# Patient Record
Sex: FEMALE | Race: WHITE | NOT HISPANIC OR LATINO | Employment: OTHER | URBAN - METROPOLITAN AREA
[De-identification: names, ages, dates, MRNs, and addresses within clinical notes are randomized per-mention and may not be internally consistent; named-entity substitution may affect disease eponyms.]

---

## 2024-03-06 ENCOUNTER — APPOINTMENT (OUTPATIENT)
Dept: RADIOLOGY | Facility: HOSPITAL | Age: 74
DRG: 560 | End: 2024-03-06
Payer: MEDICARE

## 2024-03-06 ENCOUNTER — ANESTHESIA EVENT (OUTPATIENT)
Dept: OPERATING ROOM | Facility: HOSPITAL | Age: 74
DRG: 560 | End: 2024-03-06
Payer: MEDICARE

## 2024-03-06 ENCOUNTER — HOSPITAL ENCOUNTER (INPATIENT)
Facility: HOSPITAL | Age: 74
LOS: 1 days | Discharge: NURSING, REHAB OR LONG-TERM CARE | DRG: 560 | End: 2024-03-07
Attending: STUDENT IN AN ORGANIZED HEALTH CARE EDUCATION/TRAINING PROGRAM | Admitting: INTERNAL MEDICINE
Payer: MEDICARE

## 2024-03-06 ENCOUNTER — APPOINTMENT (OUTPATIENT)
Dept: CARDIOLOGY | Facility: HOSPITAL | Age: 74
DRG: 560 | End: 2024-03-06
Payer: MEDICARE

## 2024-03-06 ENCOUNTER — ANESTHESIA (OUTPATIENT)
Dept: OPERATING ROOM | Facility: HOSPITAL | Age: 74
DRG: 560 | End: 2024-03-06
Payer: MEDICARE

## 2024-03-06 DIAGNOSIS — S73.005D DISLOCATION OF LEFT HIP, SUBSEQUENT ENCOUNTER: ICD-10-CM

## 2024-03-06 DIAGNOSIS — S73.005A DISLOCATION OF LEFT HIP, INITIAL ENCOUNTER (MULTI): Primary | ICD-10-CM

## 2024-03-06 PROBLEM — J44.9 CHRONIC OBSTRUCTIVE PULMONARY DISEASE (MULTI): Status: ACTIVE | Noted: 2024-03-06

## 2024-03-06 PROBLEM — E03.9 HYPOTHYROIDISM: Status: ACTIVE | Noted: 2024-03-06

## 2024-03-06 PROBLEM — E11.9 DIABETES MELLITUS, TYPE 2 (MULTI): Status: ACTIVE | Noted: 2024-03-06

## 2024-03-06 PROBLEM — J45.909 ASTHMA (HHS-HCC): Status: ACTIVE | Noted: 2024-03-06

## 2024-03-06 PROBLEM — E78.5 HYPERLIPIDEMIA: Status: ACTIVE | Noted: 2024-03-06

## 2024-03-06 PROBLEM — I48.91 ATRIAL FIBRILLATION (MULTI): Status: ACTIVE | Noted: 2024-03-06

## 2024-03-06 PROBLEM — I10 HTN (HYPERTENSION): Status: ACTIVE | Noted: 2024-03-06

## 2024-03-06 LAB
ABO GROUP (TYPE) IN BLOOD: NORMAL
ALBUMIN SERPL BCP-MCNC: 3.5 G/DL (ref 3.4–5)
ALP SERPL-CCNC: 129 U/L (ref 33–136)
ALT SERPL W P-5'-P-CCNC: 10 U/L (ref 7–45)
ANION GAP SERPL CALC-SCNC: 9 MMOL/L (ref 10–20)
ANTIBODY SCREEN: NORMAL
APTT PPP: 38 SECONDS (ref 27–38)
AST SERPL W P-5'-P-CCNC: 8 U/L (ref 9–39)
BILIRUB SERPL-MCNC: 0.9 MG/DL (ref 0–1.2)
BUN SERPL-MCNC: 22 MG/DL (ref 6–23)
CALCIUM SERPL-MCNC: 8.8 MG/DL (ref 8.6–10.3)
CHLORIDE SERPL-SCNC: 106 MMOL/L (ref 98–107)
CO2 SERPL-SCNC: 27 MMOL/L (ref 21–32)
CREAT SERPL-MCNC: 0.88 MG/DL (ref 0.5–1.05)
EGFRCR SERPLBLD CKD-EPI 2021: 69 ML/MIN/1.73M*2
ERYTHROCYTE [DISTWIDTH] IN BLOOD BY AUTOMATED COUNT: 14.8 % (ref 11.5–14.5)
GLUCOSE BLD MANUAL STRIP-MCNC: 116 MG/DL (ref 74–99)
GLUCOSE BLD MANUAL STRIP-MCNC: 159 MG/DL (ref 74–99)
GLUCOSE SERPL-MCNC: 126 MG/DL (ref 74–99)
HCT VFR BLD AUTO: 42.3 % (ref 36–46)
HGB BLD-MCNC: 13 G/DL (ref 12–16)
INR PPP: 1.3 (ref 0.9–1.1)
MCH RBC QN AUTO: 27.1 PG (ref 26–34)
MCHC RBC AUTO-ENTMCNC: 30.7 G/DL (ref 32–36)
MCV RBC AUTO: 88 FL (ref 80–100)
NRBC BLD-RTO: 0 /100 WBCS (ref 0–0)
PLATELET # BLD AUTO: 289 X10*3/UL (ref 150–450)
POTASSIUM SERPL-SCNC: 4.3 MMOL/L (ref 3.5–5.3)
PROT SERPL-MCNC: 5.8 G/DL (ref 6.4–8.2)
PROTHROMBIN TIME: 14.9 SECONDS (ref 9.8–12.8)
RBC # BLD AUTO: 4.8 X10*6/UL (ref 4–5.2)
RH FACTOR (ANTIGEN D): NORMAL
SODIUM SERPL-SCNC: 138 MMOL/L (ref 136–145)
WBC # BLD AUTO: 16.3 X10*3/UL (ref 4.4–11.3)

## 2024-03-06 PROCEDURE — 82947 ASSAY GLUCOSE BLOOD QUANT: CPT

## 2024-03-06 PROCEDURE — 80053 COMPREHEN METABOLIC PANEL: CPT | Performed by: NURSE PRACTITIONER

## 2024-03-06 PROCEDURE — 2500000004 HC RX 250 GENERAL PHARMACY W/ HCPCS (ALT 636 FOR OP/ED)

## 2024-03-06 PROCEDURE — 99285 EMERGENCY DEPT VISIT HI MDM: CPT | Mod: 25

## 2024-03-06 PROCEDURE — 73502 X-RAY EXAM HIP UNI 2-3 VIEWS: CPT | Mod: LT

## 2024-03-06 PROCEDURE — 3700000012 HC SEDATION LEVEL 5+ TIME - INITIAL 15 MINUTES 5/> YEARS

## 2024-03-06 PROCEDURE — A27252 PR CLOSED RX TRAUMA HIP DISLOC,ANESTH: Performed by: ANESTHESIOLOGY

## 2024-03-06 PROCEDURE — 96374 THER/PROPH/DIAG INJ IV PUSH: CPT

## 2024-03-06 PROCEDURE — 85610 PROTHROMBIN TIME: CPT | Performed by: NURSE PRACTITIONER

## 2024-03-06 PROCEDURE — 96376 TX/PRO/DX INJ SAME DRUG ADON: CPT

## 2024-03-06 PROCEDURE — 2500000004 HC RX 250 GENERAL PHARMACY W/ HCPCS (ALT 636 FOR OP/ED): Performed by: ANESTHESIOLOGY

## 2024-03-06 PROCEDURE — 2500000002 HC RX 250 W HCPCS SELF ADMINISTERED DRUGS (ALT 637 FOR MEDICARE OP, ALT 636 FOR OP/ED)

## 2024-03-06 PROCEDURE — A27266 PR CLOSED RX POST HIP FIX DISLOC,ANESTH: Performed by: ANESTHESIOLOGY

## 2024-03-06 PROCEDURE — A27252 PR CLOSED RX TRAUMA HIP DISLOC,ANESTH: Performed by: ANESTHESIOLOGIST ASSISTANT

## 2024-03-06 PROCEDURE — 0QS7XZZ REPOSITION LEFT UPPER FEMUR, EXTERNAL APPROACH: ICD-10-PCS | Performed by: ORTHOPAEDIC SURGERY

## 2024-03-06 PROCEDURE — 96361 HYDRATE IV INFUSION ADD-ON: CPT

## 2024-03-06 PROCEDURE — 99222 1ST HOSP IP/OBS MODERATE 55: CPT | Performed by: NURSE PRACTITIONER

## 2024-03-06 PROCEDURE — 93005 ELECTROCARDIOGRAM TRACING: CPT

## 2024-03-06 PROCEDURE — 86901 BLOOD TYPING SEROLOGIC RH(D): CPT | Performed by: NURSE PRACTITIONER

## 2024-03-06 PROCEDURE — 96375 TX/PRO/DX INJ NEW DRUG ADDON: CPT

## 2024-03-06 PROCEDURE — 99222 1ST HOSP IP/OBS MODERATE 55: CPT | Performed by: INTERNAL MEDICINE

## 2024-03-06 PROCEDURE — 99100 ANES PT EXTEME AGE<1 YR&>70: CPT | Performed by: ANESTHESIOLOGY

## 2024-03-06 PROCEDURE — 7100000001 HC RECOVERY ROOM TIME - INITIAL BASE CHARGE: Performed by: ORTHOPAEDIC SURGERY

## 2024-03-06 PROCEDURE — 27252 TREAT HIP DISLOCATION: CPT | Performed by: ORTHOPAEDIC SURGERY

## 2024-03-06 PROCEDURE — 2500000004 HC RX 250 GENERAL PHARMACY W/ HCPCS (ALT 636 FOR OP/ED): Mod: MUE | Performed by: ANESTHESIOLOGIST ASSISTANT

## 2024-03-06 PROCEDURE — 3700000002 HC GENERAL ANESTHESIA TIME - EACH INCREMENTAL 1 MINUTE: Performed by: ORTHOPAEDIC SURGERY

## 2024-03-06 PROCEDURE — 2500000004 HC RX 250 GENERAL PHARMACY W/ HCPCS (ALT 636 FOR OP/ED): Performed by: NURSE PRACTITIONER

## 2024-03-06 PROCEDURE — 2500000005 HC RX 250 GENERAL PHARMACY W/O HCPCS: Performed by: STUDENT IN AN ORGANIZED HEALTH CARE EDUCATION/TRAINING PROGRAM

## 2024-03-06 PROCEDURE — 2500000004 HC RX 250 GENERAL PHARMACY W/ HCPCS (ALT 636 FOR OP/ED): Performed by: STUDENT IN AN ORGANIZED HEALTH CARE EDUCATION/TRAINING PROGRAM

## 2024-03-06 PROCEDURE — A27266 PR CLOSED RX POST HIP FIX DISLOC,ANESTH: Performed by: ANESTHESIOLOGIST ASSISTANT

## 2024-03-06 PROCEDURE — 3700000001 HC GENERAL ANESTHESIA TIME - INITIAL BASE CHARGE: Performed by: ORTHOPAEDIC SURGERY

## 2024-03-06 PROCEDURE — 2720000007 HC OR 272 NO HCPCS: Performed by: ORTHOPAEDIC SURGERY

## 2024-03-06 PROCEDURE — 3600000007 HC OR TIME - EACH INCREMENTAL 1 MINUTE - PROCEDURE LEVEL TWO: Performed by: ORTHOPAEDIC SURGERY

## 2024-03-06 PROCEDURE — 2500000005 HC RX 250 GENERAL PHARMACY W/O HCPCS

## 2024-03-06 PROCEDURE — 36415 COLL VENOUS BLD VENIPUNCTURE: CPT | Performed by: NURSE PRACTITIONER

## 2024-03-06 PROCEDURE — 85730 THROMBOPLASTIN TIME PARTIAL: CPT | Performed by: NURSE PRACTITIONER

## 2024-03-06 PROCEDURE — 2500000005 HC RX 250 GENERAL PHARMACY W/O HCPCS: Performed by: ANESTHESIOLOGIST ASSISTANT

## 2024-03-06 PROCEDURE — 7100000002 HC RECOVERY ROOM TIME - EACH INCREMENTAL 1 MINUTE: Performed by: ORTHOPAEDIC SURGERY

## 2024-03-06 PROCEDURE — 1200000002 HC GENERAL ROOM WITH TELEMETRY DAILY

## 2024-03-06 PROCEDURE — 99222 1ST HOSP IP/OBS MODERATE 55: CPT

## 2024-03-06 PROCEDURE — 3600000002 HC OR TIME - INITIAL BASE CHARGE - PROCEDURE LEVEL TWO: Performed by: ORTHOPAEDIC SURGERY

## 2024-03-06 PROCEDURE — 27265 TREAT HIP DISLOCATION: CPT

## 2024-03-06 PROCEDURE — 76000 FLUOROSCOPY <1 HR PHYS/QHP: CPT

## 2024-03-06 PROCEDURE — 85027 COMPLETE CBC AUTOMATED: CPT | Performed by: NURSE PRACTITIONER

## 2024-03-06 PROCEDURE — 2500000001 HC RX 250 WO HCPCS SELF ADMINISTERED DRUGS (ALT 637 FOR MEDICARE OP)

## 2024-03-06 PROCEDURE — 2500000004 HC RX 250 GENERAL PHARMACY W/ HCPCS (ALT 636 FOR OP/ED): Performed by: ANESTHESIOLOGIST ASSISTANT

## 2024-03-06 PROCEDURE — 73502 X-RAY EXAM HIP UNI 2-3 VIEWS: CPT | Mod: LEFT SIDE | Performed by: RADIOLOGY

## 2024-03-06 RX ORDER — ONDANSETRON HYDROCHLORIDE 2 MG/ML
4 INJECTION, SOLUTION INTRAVENOUS EVERY 4 HOURS PRN
Status: DISCONTINUED | OUTPATIENT
Start: 2024-03-06 | End: 2024-03-07 | Stop reason: HOSPADM

## 2024-03-06 RX ORDER — DEXTROSE 50 % IN WATER (D50W) INTRAVENOUS SYRINGE
25
Status: DISCONTINUED | OUTPATIENT
Start: 2024-03-06 | End: 2024-03-07 | Stop reason: HOSPADM

## 2024-03-06 RX ORDER — ONDANSETRON HYDROCHLORIDE 2 MG/ML
4 INJECTION, SOLUTION INTRAVENOUS ONCE AS NEEDED
Status: DISCONTINUED | OUTPATIENT
Start: 2024-03-06 | End: 2024-03-06

## 2024-03-06 RX ORDER — PROPOFOL 10 MG/ML
0.5 INJECTION, EMULSION INTRAVENOUS AS NEEDED
Status: DISCONTINUED | OUTPATIENT
Start: 2024-03-06 | End: 2024-03-06

## 2024-03-06 RX ORDER — SODIUM CHLORIDE, SODIUM LACTATE, POTASSIUM CHLORIDE, CALCIUM CHLORIDE 600; 310; 30; 20 MG/100ML; MG/100ML; MG/100ML; MG/100ML
100 INJECTION, SOLUTION INTRAVENOUS CONTINUOUS
Status: DISCONTINUED | OUTPATIENT
Start: 2024-03-06 | End: 2024-03-06 | Stop reason: HOSPADM

## 2024-03-06 RX ORDER — CARVEDILOL 12.5 MG/1
12.5 TABLET ORAL
Status: DISCONTINUED | OUTPATIENT
Start: 2024-03-06 | End: 2024-03-07 | Stop reason: HOSPADM

## 2024-03-06 RX ORDER — DIPHENHYDRAMINE HYDROCHLORIDE 50 MG/ML
25 INJECTION INTRAMUSCULAR; INTRAVENOUS ONCE AS NEEDED
Status: DISCONTINUED | OUTPATIENT
Start: 2024-03-06 | End: 2024-03-06

## 2024-03-06 RX ORDER — HYDROMORPHONE HYDROCHLORIDE 1 MG/ML
1 INJECTION, SOLUTION INTRAMUSCULAR; INTRAVENOUS; SUBCUTANEOUS EVERY 5 MIN PRN
Status: DISCONTINUED | OUTPATIENT
Start: 2024-03-06 | End: 2024-03-06

## 2024-03-06 RX ORDER — ACETAMINOPHEN 325 MG/1
975 TABLET ORAL ONCE
Status: DISCONTINUED | OUTPATIENT
Start: 2024-03-06 | End: 2024-03-06

## 2024-03-06 RX ORDER — OXYCODONE HYDROCHLORIDE 5 MG/1
5 TABLET ORAL EVERY 6 HOURS PRN
Status: DISCONTINUED | OUTPATIENT
Start: 2024-03-06 | End: 2024-03-07 | Stop reason: HOSPADM

## 2024-03-06 RX ORDER — BUPROPION HYDROCHLORIDE 150 MG/1
300 TABLET ORAL DAILY
Status: DISCONTINUED | OUTPATIENT
Start: 2024-03-06 | End: 2024-03-07 | Stop reason: HOSPADM

## 2024-03-06 RX ORDER — FENTANYL CITRATE 50 UG/ML
50 INJECTION, SOLUTION INTRAMUSCULAR; INTRAVENOUS ONCE
Status: COMPLETED | OUTPATIENT
Start: 2024-03-06 | End: 2024-03-06

## 2024-03-06 RX ORDER — HYDRALAZINE HYDROCHLORIDE 20 MG/ML
5 INJECTION INTRAMUSCULAR; INTRAVENOUS EVERY 30 MIN PRN
Status: DISCONTINUED | OUTPATIENT
Start: 2024-03-06 | End: 2024-03-06

## 2024-03-06 RX ORDER — ALBUTEROL SULFATE 0.83 MG/ML
2.5 SOLUTION RESPIRATORY (INHALATION) ONCE AS NEEDED
Status: DISCONTINUED | OUTPATIENT
Start: 2024-03-06 | End: 2024-03-06

## 2024-03-06 RX ORDER — SODIUM CHLORIDE, SODIUM LACTATE, POTASSIUM CHLORIDE, CALCIUM CHLORIDE 600; 310; 30; 20 MG/100ML; MG/100ML; MG/100ML; MG/100ML
100 INJECTION, SOLUTION INTRAVENOUS CONTINUOUS
Status: DISCONTINUED | OUTPATIENT
Start: 2024-03-06 | End: 2024-03-07 | Stop reason: HOSPADM

## 2024-03-06 RX ORDER — GABAPENTIN 300 MG/1
900 CAPSULE ORAL 2 TIMES DAILY
COMMUNITY

## 2024-03-06 RX ORDER — METOPROLOL TARTRATE 1 MG/ML
5 INJECTION, SOLUTION INTRAVENOUS ONCE
Status: DISCONTINUED | OUTPATIENT
Start: 2024-03-06 | End: 2024-03-06

## 2024-03-06 RX ORDER — LIDOCAINE HYDROCHLORIDE 10 MG/ML
0.1 INJECTION INFILTRATION; PERINEURAL ONCE
Status: DISCONTINUED | OUTPATIENT
Start: 2024-03-06 | End: 2024-03-06 | Stop reason: HOSPADM

## 2024-03-06 RX ORDER — LOSARTAN POTASSIUM 50 MG/1
50 TABLET ORAL DAILY
Status: DISCONTINUED | OUTPATIENT
Start: 2024-03-06 | End: 2024-03-07 | Stop reason: HOSPADM

## 2024-03-06 RX ORDER — DEXTROSE MONOHYDRATE 100 MG/ML
0.3 INJECTION, SOLUTION INTRAVENOUS ONCE AS NEEDED
Status: DISCONTINUED | OUTPATIENT
Start: 2024-03-06 | End: 2024-03-07 | Stop reason: HOSPADM

## 2024-03-06 RX ORDER — IPRATROPIUM BROMIDE 17 UG/1
2 AEROSOL, METERED RESPIRATORY (INHALATION)
COMMUNITY

## 2024-03-06 RX ORDER — ACETAMINOPHEN 500 MG
1000 TABLET ORAL EVERY 6 HOURS PRN
COMMUNITY

## 2024-03-06 RX ORDER — INSULIN LISPRO 100 [IU]/ML
0-10 INJECTION, SOLUTION INTRAVENOUS; SUBCUTANEOUS EVERY 4 HOURS
Status: DISCONTINUED | OUTPATIENT
Start: 2024-03-06 | End: 2024-03-07 | Stop reason: HOSPADM

## 2024-03-06 RX ORDER — PROPOFOL 10 MG/ML
INJECTION, EMULSION INTRAVENOUS CODE/TRAUMA/SEDATION MEDICATION
Status: COMPLETED | OUTPATIENT
Start: 2024-03-06 | End: 2024-03-06

## 2024-03-06 RX ORDER — LEVOTHYROXINE SODIUM 125 UG/1
125 TABLET ORAL
Status: DISCONTINUED | OUTPATIENT
Start: 2024-03-07 | End: 2024-03-07 | Stop reason: HOSPADM

## 2024-03-06 RX ORDER — POLYETHYLENE GLYCOL 3350 17 G/17G
17 POWDER, FOR SOLUTION ORAL DAILY
Status: DISCONTINUED | OUTPATIENT
Start: 2024-03-06 | End: 2024-03-07 | Stop reason: HOSPADM

## 2024-03-06 RX ORDER — LIDOCAINE HCL/PF 100 MG/5ML
SYRINGE (ML) INTRAVENOUS AS NEEDED
Status: DISCONTINUED | OUTPATIENT
Start: 2024-03-06 | End: 2024-03-06

## 2024-03-06 RX ORDER — ZOLPIDEM TARTRATE 5 MG/1
5 TABLET ORAL NIGHTLY
Status: DISCONTINUED | OUTPATIENT
Start: 2024-03-06 | End: 2024-03-07 | Stop reason: HOSPADM

## 2024-03-06 RX ORDER — LABETALOL HYDROCHLORIDE 5 MG/ML
5 INJECTION, SOLUTION INTRAVENOUS ONCE AS NEEDED
Status: DISCONTINUED | OUTPATIENT
Start: 2024-03-06 | End: 2024-03-06

## 2024-03-06 RX ORDER — SODIUM CHLORIDE 9 MG/ML
50 INJECTION, SOLUTION INTRAVENOUS CONTINUOUS
Status: ACTIVE | OUTPATIENT
Start: 2024-03-06 | End: 2024-03-07

## 2024-03-06 RX ORDER — ONDANSETRON 4 MG/1
4 TABLET, FILM COATED ORAL EVERY 8 HOURS PRN
Status: DISCONTINUED | OUTPATIENT
Start: 2024-03-06 | End: 2024-03-07 | Stop reason: HOSPADM

## 2024-03-06 RX ORDER — MIDAZOLAM HYDROCHLORIDE 1 MG/ML
1 INJECTION, SOLUTION INTRAMUSCULAR; INTRAVENOUS ONCE AS NEEDED
Status: DISCONTINUED | OUTPATIENT
Start: 2024-03-06 | End: 2024-03-06

## 2024-03-06 RX ORDER — ATORVASTATIN CALCIUM 40 MG/1
40 TABLET, FILM COATED ORAL DAILY
Status: DISCONTINUED | OUTPATIENT
Start: 2024-03-06 | End: 2024-03-07 | Stop reason: HOSPADM

## 2024-03-06 RX ORDER — ACETYLCYSTEINE 200 MG/ML
4 SOLUTION ORAL; RESPIRATORY (INHALATION) 4 TIMES DAILY PRN
Status: DISCONTINUED | OUTPATIENT
Start: 2024-03-06 | End: 2024-03-07 | Stop reason: HOSPADM

## 2024-03-06 RX ORDER — LEVOTHYROXINE SODIUM 125 UG/1
125 TABLET ORAL
COMMUNITY

## 2024-03-06 RX ORDER — HYDROMORPHONE HYDROCHLORIDE 1 MG/ML
1 INJECTION, SOLUTION INTRAMUSCULAR; INTRAVENOUS; SUBCUTANEOUS EVERY 5 MIN PRN
Status: DISCONTINUED | OUTPATIENT
Start: 2024-03-06 | End: 2024-03-06 | Stop reason: HOSPADM

## 2024-03-06 RX ORDER — SODIUM CHLORIDE, SODIUM LACTATE, POTASSIUM CHLORIDE, CALCIUM CHLORIDE 600; 310; 30; 20 MG/100ML; MG/100ML; MG/100ML; MG/100ML
50 INJECTION, SOLUTION INTRAVENOUS CONTINUOUS
Status: DISCONTINUED | OUTPATIENT
Start: 2024-03-06 | End: 2024-03-07

## 2024-03-06 RX ORDER — BUPROPION HYDROCHLORIDE 300 MG/1
300 TABLET ORAL DAILY
COMMUNITY

## 2024-03-06 RX ORDER — MEPERIDINE HYDROCHLORIDE 25 MG/ML
12.5 INJECTION INTRAMUSCULAR; INTRAVENOUS; SUBCUTANEOUS EVERY 10 MIN PRN
Status: DISCONTINUED | OUTPATIENT
Start: 2024-03-06 | End: 2024-03-06 | Stop reason: HOSPADM

## 2024-03-06 RX ORDER — PROPOFOL 10 MG/ML
INJECTION, EMULSION INTRAVENOUS AS NEEDED
Status: DISCONTINUED | OUTPATIENT
Start: 2024-03-06 | End: 2024-03-06

## 2024-03-06 RX ORDER — ONDANSETRON HYDROCHLORIDE 2 MG/ML
4 INJECTION, SOLUTION INTRAVENOUS ONCE
Status: COMPLETED | OUTPATIENT
Start: 2024-03-06 | End: 2024-03-06

## 2024-03-06 RX ORDER — SCOLOPAMINE TRANSDERMAL SYSTEM 1 MG/1
1 PATCH, EXTENDED RELEASE TRANSDERMAL ONCE
Status: DISCONTINUED | OUTPATIENT
Start: 2024-03-06 | End: 2024-03-06

## 2024-03-06 RX ORDER — ATORVASTATIN CALCIUM 40 MG/1
40 TABLET, FILM COATED ORAL DAILY
COMMUNITY

## 2024-03-06 RX ORDER — ACETYLCYSTEINE 200 MG/ML
4 SOLUTION ORAL; RESPIRATORY (INHALATION)
Status: DISCONTINUED | OUTPATIENT
Start: 2024-03-06 | End: 2024-03-06

## 2024-03-06 RX ORDER — LOSARTAN POTASSIUM 50 MG/1
50 TABLET ORAL DAILY
COMMUNITY

## 2024-03-06 RX ORDER — ACETYLCYSTEINE 200 MG/ML
4 SOLUTION ORAL; RESPIRATORY (INHALATION)
COMMUNITY

## 2024-03-06 RX ORDER — ACETAMINOPHEN 650 MG/1
650 SUPPOSITORY RECTAL EVERY 4 HOURS PRN
Status: DISCONTINUED | OUTPATIENT
Start: 2024-03-06 | End: 2024-03-07 | Stop reason: HOSPADM

## 2024-03-06 RX ORDER — MEPERIDINE HYDROCHLORIDE 25 MG/ML
12.5 INJECTION INTRAMUSCULAR; INTRAVENOUS; SUBCUTANEOUS EVERY 10 MIN PRN
Status: DISCONTINUED | OUTPATIENT
Start: 2024-03-06 | End: 2024-03-06

## 2024-03-06 RX ORDER — ONDANSETRON HYDROCHLORIDE 2 MG/ML
4 INJECTION, SOLUTION INTRAVENOUS EVERY 8 HOURS PRN
Status: DISCONTINUED | OUTPATIENT
Start: 2024-03-06 | End: 2024-03-07 | Stop reason: HOSPADM

## 2024-03-06 RX ORDER — ZOLPIDEM TARTRATE 12.5 MG/1
12.5 TABLET, FILM COATED, EXTENDED RELEASE ORAL NIGHTLY PRN
COMMUNITY

## 2024-03-06 RX ORDER — GABAPENTIN 300 MG/1
900 CAPSULE ORAL 2 TIMES DAILY
Status: DISCONTINUED | OUTPATIENT
Start: 2024-03-06 | End: 2024-03-07 | Stop reason: HOSPADM

## 2024-03-06 RX ORDER — TRAMADOL HYDROCHLORIDE 50 MG/1
50 TABLET ORAL
Status: DISCONTINUED | OUTPATIENT
Start: 2024-03-06 | End: 2024-03-07 | Stop reason: HOSPADM

## 2024-03-06 RX ORDER — CARVEDILOL 12.5 MG/1
TABLET ORAL
COMMUNITY

## 2024-03-06 RX ORDER — FAMOTIDINE 10 MG/ML
20 INJECTION INTRAVENOUS ONCE
Status: DISCONTINUED | OUTPATIENT
Start: 2024-03-06 | End: 2024-03-06

## 2024-03-06 RX ORDER — PROPOFOL 10 MG/ML
100 INJECTION, EMULSION INTRAVENOUS ONCE
Status: DISCONTINUED | OUTPATIENT
Start: 2024-03-06 | End: 2024-03-06

## 2024-03-06 RX ORDER — IPRATROPIUM BROMIDE AND ALBUTEROL SULFATE 2.5; .5 MG/3ML; MG/3ML
SOLUTION RESPIRATORY (INHALATION)
Status: COMPLETED
Start: 2024-03-06 | End: 2024-03-06

## 2024-03-06 RX ORDER — ROCURONIUM BROMIDE 10 MG/ML
INJECTION, SOLUTION INTRAVENOUS AS NEEDED
Status: DISCONTINUED | OUTPATIENT
Start: 2024-03-06 | End: 2024-03-06

## 2024-03-06 RX ORDER — IPRATROPIUM BROMIDE AND ALBUTEROL SULFATE 2.5; .5 MG/3ML; MG/3ML
3 SOLUTION RESPIRATORY (INHALATION)
Status: DISCONTINUED | OUTPATIENT
Start: 2024-03-06 | End: 2024-03-06 | Stop reason: HOSPADM

## 2024-03-06 RX ORDER — SUCCINYLCHOLINE CHLORIDE 20 MG/ML INJECTION SOLUTION
SOLUTION AS NEEDED
Status: DISCONTINUED | OUTPATIENT
Start: 2024-03-06 | End: 2024-03-06

## 2024-03-06 RX ORDER — CEFAZOLIN SODIUM 2 G/100ML
2 INJECTION, SOLUTION INTRAVENOUS EVERY 8 HOURS
Status: DISCONTINUED | OUTPATIENT
Start: 2024-03-06 | End: 2024-03-06 | Stop reason: HOSPADM

## 2024-03-06 RX ORDER — SODIUM CHLORIDE, SODIUM LACTATE, POTASSIUM CHLORIDE, CALCIUM CHLORIDE 600; 310; 30; 20 MG/100ML; MG/100ML; MG/100ML; MG/100ML
100 INJECTION, SOLUTION INTRAVENOUS CONTINUOUS
Status: DISCONTINUED | OUTPATIENT
Start: 2024-03-06 | End: 2024-03-06

## 2024-03-06 RX ORDER — MORPHINE SULFATE 2 MG/ML
2 INJECTION, SOLUTION INTRAMUSCULAR; INTRAVENOUS EVERY 5 MIN PRN
Status: DISCONTINUED | OUTPATIENT
Start: 2024-03-06 | End: 2024-03-06

## 2024-03-06 RX ADMIN — OXYCODONE HYDROCHLORIDE 5 MG: 5 TABLET ORAL at 18:06

## 2024-03-06 RX ADMIN — SODIUM CHLORIDE 50 ML/HR: 9 INJECTION, SOLUTION INTRAVENOUS at 04:52

## 2024-03-06 RX ADMIN — HYDROMORPHONE HYDROCHLORIDE 1 MG: 2 INJECTION, SOLUTION INTRAMUSCULAR; INTRAVENOUS; SUBCUTANEOUS at 04:20

## 2024-03-06 RX ADMIN — PROPOFOL 60 MG: 10 INJECTION, EMULSION INTRAVENOUS at 01:15

## 2024-03-06 RX ADMIN — HYDROMORPHONE HYDROCHLORIDE 0.5 MG: 1 INJECTION, SOLUTION INTRAMUSCULAR; INTRAVENOUS; SUBCUTANEOUS at 15:03

## 2024-03-06 RX ADMIN — PROPOFOL 20 MG: 10 INJECTION, EMULSION INTRAVENOUS at 01:21

## 2024-03-06 RX ADMIN — HYDROMORPHONE HYDROCHLORIDE 1 MG: 1 INJECTION, SOLUTION INTRAMUSCULAR; INTRAVENOUS; SUBCUTANEOUS at 03:32

## 2024-03-06 RX ADMIN — ONDANSETRON 4 MG: 2 INJECTION INTRAMUSCULAR; INTRAVENOUS at 01:06

## 2024-03-06 RX ADMIN — HYDROMORPHONE HYDROCHLORIDE 1 MG: 2 INJECTION, SOLUTION INTRAMUSCULAR; INTRAVENOUS; SUBCUTANEOUS at 08:50

## 2024-03-06 RX ADMIN — TRAMADOL HYDROCHLORIDE 50 MG: 50 TABLET, COATED ORAL at 20:30

## 2024-03-06 RX ADMIN — HYDROMORPHONE HYDROCHLORIDE 0.5 MG: 2 INJECTION, SOLUTION INTRAMUSCULAR; INTRAVENOUS; SUBCUTANEOUS at 13:20

## 2024-03-06 RX ADMIN — FENTANYL CITRATE 50 MCG: 50 INJECTION INTRAMUSCULAR; INTRAVENOUS at 01:06

## 2024-03-06 RX ADMIN — Medication 20 MG: at 02:55

## 2024-03-06 RX ADMIN — ZOLPIDEM TARTRATE 5 MG: 5 TABLET, COATED ORAL at 20:31

## 2024-03-06 RX ADMIN — Medication: at 00:55

## 2024-03-06 RX ADMIN — FENTANYL CITRATE 50 MCG: 50 INJECTION INTRAMUSCULAR; INTRAVENOUS at 01:57

## 2024-03-06 RX ADMIN — LIDOCAINE HYDROCHLORIDE 50 MG: 20 INJECTION INTRAVENOUS at 14:08

## 2024-03-06 RX ADMIN — SODIUM CHLORIDE 1000 ML: 9 INJECTION, SOLUTION INTRAVENOUS at 01:10

## 2024-03-06 RX ADMIN — BUPROPION HYDROCHLORIDE 300 MG: 150 TABLET, FILM COATED, EXTENDED RELEASE ORAL at 18:54

## 2024-03-06 RX ADMIN — POLYETHYLENE GLYCOL 3350 17 G: 17 POWDER, FOR SOLUTION ORAL at 18:54

## 2024-03-06 RX ADMIN — IPRATROPIUM BROMIDE AND ALBUTEROL SULFATE 3 ML: .5; 3 SOLUTION RESPIRATORY (INHALATION) at 13:25

## 2024-03-06 RX ADMIN — PROPOFOL 50 MG: 10 INJECTION, EMULSION INTRAVENOUS at 02:55

## 2024-03-06 RX ADMIN — PROPOFOL 150 MG: 10 INJECTION, EMULSION INTRAVENOUS at 02:52

## 2024-03-06 RX ADMIN — SODIUM CHLORIDE, POTASSIUM CHLORIDE, SODIUM LACTATE AND CALCIUM CHLORIDE 100 ML/HR: 600; 310; 30; 20 INJECTION, SOLUTION INTRAVENOUS at 11:38

## 2024-03-06 RX ADMIN — Medication 2 L/MIN: at 16:30

## 2024-03-06 RX ADMIN — CARVEDILOL 12.5 MG: 12.5 TABLET, FILM COATED ORAL at 18:54

## 2024-03-06 RX ADMIN — Medication 30 MG: at 02:52

## 2024-03-06 RX ADMIN — ATORVASTATIN CALCIUM 40 MG: 40 TABLET, FILM COATED ORAL at 20:30

## 2024-03-06 RX ADMIN — IPRATROPIUM BROMIDE AND ALBUTEROL SULFATE 3 ML: 2.5; .5 SOLUTION RESPIRATORY (INHALATION) at 13:25

## 2024-03-06 RX ADMIN — GABAPENTIN 900 MG: 300 CAPSULE ORAL at 20:30

## 2024-03-06 RX ADMIN — LOSARTAN POTASSIUM 50 MG: 50 TABLET, FILM COATED ORAL at 18:54

## 2024-03-06 RX ADMIN — PROPOFOL 100 MG: 10 INJECTION, EMULSION INTRAVENOUS at 14:08

## 2024-03-06 RX ADMIN — ROCURONIUM BROMIDE 5 MG: 10 INJECTION, SOLUTION INTRAVENOUS at 14:08

## 2024-03-06 RX ADMIN — Medication 100 MG: at 14:09

## 2024-03-06 SDOH — SOCIAL STABILITY: SOCIAL INSECURITY: ARE YOU OR HAVE YOU BEEN THREATENED OR ABUSED PHYSICALLY, EMOTIONALLY, OR SEXUALLY BY ANYONE?: NO

## 2024-03-06 SDOH — SOCIAL STABILITY: SOCIAL INSECURITY: ARE THERE ANY APPARENT SIGNS OF INJURIES/BEHAVIORS THAT COULD BE RELATED TO ABUSE/NEGLECT?: NO

## 2024-03-06 SDOH — SOCIAL STABILITY: SOCIAL INSECURITY: ABUSE: ADULT

## 2024-03-06 SDOH — SOCIAL STABILITY: SOCIAL INSECURITY: HAVE YOU HAD THOUGHTS OF HARMING ANYONE ELSE?: YES

## 2024-03-06 SDOH — SOCIAL STABILITY: SOCIAL INSECURITY: DO YOU FEEL UNSAFE GOING BACK TO THE PLACE WHERE YOU ARE LIVING?: NO

## 2024-03-06 SDOH — HEALTH STABILITY: MENTAL HEALTH: CURRENT SMOKER: 0

## 2024-03-06 SDOH — SOCIAL STABILITY: SOCIAL INSECURITY: DO YOU FEEL ANYONE HAS EXPLOITED OR TAKEN ADVANTAGE OF YOU FINANCIALLY OR OF YOUR PERSONAL PROPERTY?: NO

## 2024-03-06 SDOH — SOCIAL STABILITY: SOCIAL INSECURITY: DOES ANYONE TRY TO KEEP YOU FROM HAVING/CONTACTING OTHER FRIENDS OR DOING THINGS OUTSIDE YOUR HOME?: NO

## 2024-03-06 SDOH — SOCIAL STABILITY: SOCIAL INSECURITY: HAS ANYONE EVER THREATENED TO HURT YOUR FAMILY OR YOUR PETS?: NO

## 2024-03-06 ASSESSMENT — PAIN SCALES - GENERAL
PAIN_LEVEL: 1
PAINLEVEL_OUTOF10: 4
PAINLEVEL_OUTOF10: 3
PAINLEVEL_OUTOF10: 10 - WORST POSSIBLE PAIN
PAINLEVEL_OUTOF10: 7
PAINLEVEL_OUTOF10: 3
PAINLEVEL_OUTOF10: 7
PAINLEVEL_OUTOF10: 7
PAINLEVEL_OUTOF10: 10 - WORST POSSIBLE PAIN
PAINLEVEL_OUTOF10: 3
PAINLEVEL_OUTOF10: 0 - NO PAIN
PAINLEVEL_OUTOF10: 10 - WORST POSSIBLE PAIN
PAINLEVEL_OUTOF10: 0 - NO PAIN
PAINLEVEL_OUTOF10: 8

## 2024-03-06 ASSESSMENT — COGNITIVE AND FUNCTIONAL STATUS - GENERAL
MOBILITY SCORE: 13
DRESSING REGULAR UPPER BODY CLOTHING: A LITTLE
MOVING FROM LYING ON BACK TO SITTING ON SIDE OF FLAT BED WITH BEDRAILS: A LITTLE
MOVING TO AND FROM BED TO CHAIR: A LOT
PATIENT BASELINE BEDBOUND: NO
DRESSING REGULAR LOWER BODY CLOTHING: A LITTLE
MOVING FROM LYING ON BACK TO SITTING ON SIDE OF FLAT BED WITH BEDRAILS: A LITTLE
TURNING FROM BACK TO SIDE WHILE IN FLAT BAD: A LITTLE
MOBILITY SCORE: 16
STANDING UP FROM CHAIR USING ARMS: A LITTLE
DAILY ACTIVITIY SCORE: 20
HELP NEEDED FOR BATHING: A LITTLE
WALKING IN HOSPITAL ROOM: A LOT
WALKING IN HOSPITAL ROOM: A LITTLE
CLIMB 3 TO 5 STEPS WITH RAILING: TOTAL
MOBILITY SCORE: 16
DRESSING REGULAR LOWER BODY CLOTHING: A LOT
TOILETING: A LITTLE
DRESSING REGULAR LOWER BODY CLOTHING: A LITTLE
TOILETING: A LITTLE
TURNING FROM BACK TO SIDE WHILE IN FLAT BAD: A LITTLE
MOVING TO AND FROM BED TO CHAIR: A LITTLE
CLIMB 3 TO 5 STEPS WITH RAILING: TOTAL
DAILY ACTIVITIY SCORE: 20
MOVING TO AND FROM BED TO CHAIR: A LITTLE
CLIMB 3 TO 5 STEPS WITH RAILING: TOTAL
DRESSING REGULAR UPPER BODY CLOTHING: A LITTLE
TURNING FROM BACK TO SIDE WHILE IN FLAT BAD: A LITTLE
DAILY ACTIVITIY SCORE: 19
HELP NEEDED FOR BATHING: A LITTLE
HELP NEEDED FOR BATHING: A LITTLE
DRESSING REGULAR UPPER BODY CLOTHING: A LITTLE
MOVING FROM LYING ON BACK TO SITTING ON SIDE OF FLAT BED WITH BEDRAILS: A LITTLE
STANDING UP FROM CHAIR USING ARMS: A LITTLE
STANDING UP FROM CHAIR USING ARMS: A LOT
TOILETING: A LITTLE
WALKING IN HOSPITAL ROOM: A LITTLE

## 2024-03-06 ASSESSMENT — LIFESTYLE VARIABLES
SUBSTANCE_ABUSE_PAST_12_MONTHS: YES
SKIP TO QUESTIONS 9-10: 1
HOW OFTEN DO YOU HAVE A DRINK CONTAINING ALCOHOL: NEVER
HOW OFTEN DO YOU HAVE 6 OR MORE DRINKS ON ONE OCCASION: NEVER
AUDIT-C TOTAL SCORE: 0
HOW MANY STANDARD DRINKS CONTAINING ALCOHOL DO YOU HAVE ON A TYPICAL DAY: PATIENT DOES NOT DRINK
AUDIT-C TOTAL SCORE: 0

## 2024-03-06 ASSESSMENT — PATIENT HEALTH QUESTIONNAIRE - PHQ9
2. FEELING DOWN, DEPRESSED OR HOPELESS: NOT AT ALL
SUM OF ALL RESPONSES TO PHQ9 QUESTIONS 1 & 2: 0
1. LITTLE INTEREST OR PLEASURE IN DOING THINGS: NOT AT ALL

## 2024-03-06 ASSESSMENT — ACTIVITIES OF DAILY LIVING (ADL)
HEARING - RIGHT EAR: FUNCTIONAL
LACK_OF_TRANSPORTATION: NO
TOILETING: INDEPENDENT
JUDGMENT_ADEQUATE_SAFELY_COMPLETE_DAILY_ACTIVITIES: YES
BATHING: INDEPENDENT
HEARING - LEFT EAR: FUNCTIONAL
DRESSING YOURSELF: INDEPENDENT
HEARING - LEFT EAR: FUNCTIONAL
TOILETING: INDEPENDENT
GROOMING: INDEPENDENT
WALKS IN HOME: INDEPENDENT
FEEDING YOURSELF: INDEPENDENT
HEARING - RIGHT EAR: FUNCTIONAL
PATIENT'S MEMORY ADEQUATE TO SAFELY COMPLETE DAILY ACTIVITIES?: YES
WALKS IN HOME: INDEPENDENT
DRESSING YOURSELF: INDEPENDENT
ADEQUATE_TO_COMPLETE_ADL: YES
GROOMING: INDEPENDENT
FEEDING YOURSELF: INDEPENDENT
JUDGMENT_ADEQUATE_SAFELY_COMPLETE_DAILY_ACTIVITIES: YES
ADEQUATE_TO_COMPLETE_ADL: YES
BATHING: INDEPENDENT
PATIENT'S MEMORY ADEQUATE TO SAFELY COMPLETE DAILY ACTIVITIES?: YES

## 2024-03-06 ASSESSMENT — PAIN - FUNCTIONAL ASSESSMENT
PAIN_FUNCTIONAL_ASSESSMENT: 0-10

## 2024-03-06 ASSESSMENT — PAIN DESCRIPTION - LOCATION
LOCATION: HIP

## 2024-03-06 ASSESSMENT — PAIN DESCRIPTION - ORIENTATION
ORIENTATION: LEFT

## 2024-03-06 ASSESSMENT — PAIN DESCRIPTION - DESCRIPTORS
DESCRIPTORS: SPASM;STABBING
DESCRIPTORS: ACHING

## 2024-03-06 ASSESSMENT — COLUMBIA-SUICIDE SEVERITY RATING SCALE - C-SSRS
1. IN THE PAST MONTH, HAVE YOU WISHED YOU WERE DEAD OR WISHED YOU COULD GO TO SLEEP AND NOT WAKE UP?: NO
6. HAVE YOU EVER DONE ANYTHING, STARTED TO DO ANYTHING, OR PREPARED TO DO ANYTHING TO END YOUR LIFE?: NO
2. HAVE YOU ACTUALLY HAD ANY THOUGHTS OF KILLING YOURSELF?: NO

## 2024-03-06 NOTE — CARE PLAN
The patient's goals for the shift include Pain free    The clinical goals for the shift include Patient will be without pain during shift

## 2024-03-06 NOTE — ANESTHESIA POSTPROCEDURE EVALUATION
Patient: Maricel Tomlin    Procedure Summary       Date: 03/06/24 Room / Location: [facility] OR 02 / [facility] OR    Anesthesia Start: 0247 Anesthesia Stop: 0310    Procedure: Attempted Hip Dislocation Total Closed Reduction (Left: Hip) Diagnosis:       Dislocation of left hip, initial encounter (CMS/[facility])      (Dislocation of left hip, initial encounter (CMS/[facility]) [S73.005A])    Surgeons: Elia Ferraro MD Responsible Provider: Yfn Perry MD    Anesthesia Type: MAC ASA Status: 3            Anesthesia Type: MAC    Vitals Value Taken Time   /57 03/06/24 0310   Temp 36.2 03/06/24 0310   Pulse 67 03/06/24 0310   Resp 14 03/06/24 0310   SpO2 98 03/06/24 0310       Anesthesia Post Evaluation    Patient participation: complete - patient participated  Level of consciousness: awake  Pain management: adequate  Airway patency: patent  Cardiovascular status: acceptable  Respiratory status: acceptable  Hydration status: acceptable  Postoperative Nausea and Vomiting: none        No notable events documented.

## 2024-03-06 NOTE — ANESTHESIA PREPROCEDURE EVALUATION
Patient: Maricel Tomlin    Procedure Information       Date/Time: 03/06/24 1413    Procedures:       CLOSED REDUCTION OF LEFT HIP DISLOCATION OF PROSTHESIS (Left: Hip)      POSSIBLE REVISION OF LEFT HIP ARTHROPLASTY (Left: Hip)    Location: [facility] OR 07 / [facility] OR    Surgeons: Wesly Nicole MD            Relevant Problems   Anesthesia (within normal limits)      Cardiovascular   (+) Atrial fibrillation (CMS/HCC)   (+) HTN (hypertension)   (+) Hyperlipidemia      Endocrine   (+) Diabetes mellitus, type 2 (CMS/HCC)   (+) Hypothyroidism      Pulmonary   (+) Asthma   (+) Chronic obstructive pulmonary disease (CMS/HCC)       Clinical information reviewed:    Allergies  Meds               NPO Detail:  NPO/Void Status  Date of Last Liquid: 03/06/24  Time of Last Liquid: 1100  Date of Last Solid: 03/05/24  Time of Last Solid: 1400         Physical Exam    Airway  Mallampati: II  TM distance: >3 FB  Neck ROM: full     Cardiovascular - normal exam     Dental - normal exam     Pulmonary - normal exam     Abdominal   (+) obese             Anesthesia Plan    History of general anesthesia?: yes  History of complications of general anesthesia?: no    ASA 3     general     The patient is not a current smoker.  Patient was previously instructed to abstain from smoking on day of procedure.  Patient did not smoke on day of procedure.    intravenous induction   Postoperative administration of opioids is intended.  Anesthetic plan and risks discussed with patient.  Use of blood products discussed with patient who consented to blood products.    Plan discussed with CRNA.

## 2024-03-06 NOTE — CONSULTS
Reason For Consult  Left total hip arthroplasty dislocation     History Of Present Illness  Maricel Tomlin is a 73 y.o. female has a past medical history of diabetes, a-fib/a-flutter, hyperlipidemia, HTN, osteoarthritis, ELLIE, and asthma. Presented to the [place] ER yesterday after a left hip dislocation at home. Left hip was replaced per Dr. Nicole in 2018. Patient states that she was sitting on the end of her bed, and leaned over to grab something off the floor and felt a pop in her hip. She was unable to weight bear on left lower extremity. EMS was called and transferred patient to ER. X-rays were obtained in ER which confirmed left hip dislocation. Hip reduction attempted last night under anesthesia per Dr. Ferraro. States she is currently in 8/10 pain. Denies numbness and paresthesia of left lower extremity.      Past Medical History  She has a past medical history of Other conditions influencing health status, Personal history of malignant neoplasm, unspecified, Personal history of other diseases of the circulatory system, Personal history of other diseases of the musculoskeletal system and connective tissue, Personal history of other diseases of the respiratory system, Personal history of other endocrine, nutritional and metabolic disease, Personal history of other endocrine, nutritional and metabolic disease, Personal history of other malignant neoplasm of kidney, Personal history of other mental and behavioral disorders, and Personal history of other specified conditions.    Surgical History  She has a past surgical history that includes Total hip arthroplasty (05/01/2018); Other surgical history (11/30/2020); Other surgical history (11/30/2020); Other surgical history (11/30/2020); MR angio neck wo IV contrast (9/15/2020); and MR angio head wo IV contrast (9/15/2020).     Social History  She reports that she has never smoked. She does not have any smokeless tobacco history on file. She reports that she does not  "drink alcohol and does not use drugs.    Family History  No family history on file.     Allergies  Metformin and Sulfa (sulfonamide antibiotics)    Physical Exam  Pleasant. A&Ox3. Left hip dislocated. Tenderness to left lateral hip and groin. ROM deferred due to dislocation. Sensation is intact to light touch. Left lower extremity is well perfused. Patient also seen and assessed per Dr. Nicole in pre-operative area.      Last Recorded Vitals  Blood pressure 140/68, pulse 70, temperature 36.1 °C (97 °F), temperature source Temporal, resp. rate 18, height 1.702 m (5' 7\"), weight 113 kg (250 lb), SpO2 97 %.    Relevant Results  XR hip left with pelvis when performed 2 or 3 views    Result Date: 3/6/2024  Interpreted By:  Leandro Puente, STUDY: XR HIP LEFT WITH PELVIS WHEN PERFORMED 2 OR 3 VIEWS; ;  3/6/2024 12:53 am   INDICATION: Signs/Symptoms:possible dislocation.   COMPARISON: 05/14/2016   ACCESSION NUMBER(S): [id number]   ORDERING CLINICIAN: DOUGIE RUVALCABA   FINDINGS: AP radiograph of the pelvis and two views of the left hip: No acute fracture. Status post bilateral total hip arthroplasty. There is posterosuperior dislocation of the left femoral component relative to the acetabular component. There is diffuse osteopenia. Rounded metallic devices projected over the lower lumbar spine. Is mild sacroiliac arthrosis. Slight soft tissue swelling about the left hip.       1. Left hip arthroplasty dislocation.   Signed by: Leandro Puente 3/6/2024 1:12 AM Dictation workstation:   [id number]    XR foot right 3+ views    Result Date: 2/23/2024  * * *Final Report* * * DATE OF EXAM: Feb 23 2024  8:29AM   BFX   5337  -  XR FOOT 3V AP/LAT/OBL RT  / ACCESSION #  [id number] PROCEDURE REASON: Foot pain, right      * * * * Physician Interpretation * * * *  Clinical Information: Pain Three views of the right  foot were obtained. There is no acute fracture or dislocation.  Deformity of the distal aspect fifth " metatarsal likely related to remote trauma. Severe narrowing of the first metatarsophalangeal joint.  Degenerative changes second through fifth proximal and distal interphalangeal joints.   Severe degenerative changes tibiotalar joint. Prominent plantar calcaneal spur. No lytic or blastic lesions identified. No soft tissue abnormalities are seen.    IMPRESSION: No acute abnormality of the right foot. Degenerative changes. Remote posttraumatic deformity fifth metatarsal. : [facility]   Transcribe Date/Time: Feb 23 2024 10:47A Dictated by : ASIA GONZALEZ MD This examination was interpreted and the report reviewed and electronically signed by: ASIA GONZALEZ MD on Feb 23 2024 10:49AM  EST      Scheduled medications  ceFAZolin, 2 g, intravenous, q8h  [MAR Hold] insulin lispro, 0-10 Units, subcutaneous, q4h      Continuous medications  lactated Ringer's, 100 mL/hr, Last Rate: 100 mL/hr (03/06/24 1138)  oxygen,   sodium chloride 0.9%, 50 mL/hr, Last Rate: 50 mL/hr (03/06/24 7667)      PRN medications  PRN medications: [MAR Hold] acetaminophen, [MAR Hold] dextrose 10 % in water (D10W), [MAR Hold] dextrose, [MAR Hold] glucagon, [MAR Hold] HYDROmorphone, [MAR Hold] HYDROmorphone, [MAR Hold] ondansetron  Results for orders placed or performed during the hospital encounter of 03/06/24 (from the past 24 hour(s))   POCT GLUCOSE   Result Value Ref Range    POCT Glucose 159 (H) 74 - 99 mg/dL   CBC   Result Value Ref Range    WBC 16.3 (H) 4.4 - 11.3 x10*3/uL    nRBC 0.0 0.0 - 0.0 /100 WBCs    RBC 4.80 4.00 - 5.20 x10*6/uL    Hemoglobin 13.0 12.0 - 16.0 g/dL    Hematocrit 42.3 36.0 - 46.0 %    MCV 88 80 - 100 fL    MCH 27.1 26.0 - 34.0 pg    MCHC 30.7 (L) 32.0 - 36.0 g/dL    RDW 14.8 (H) 11.5 - 14.5 %    Platelets 289 150 - 450 x10*3/uL   Comprehensive metabolic panel   Result Value Ref Range    Glucose 126 (H) 74 - 99 mg/dL    Sodium 138 136 - 145 mmol/L    Potassium 4.3 3.5 - 5.3 mmol/L    Chloride 106 98 - 107 mmol/L     Bicarbonate 27 21 - 32 mmol/L    Anion Gap 9 (L) 10 - 20 mmol/L    Urea Nitrogen 22 6 - 23 mg/dL    Creatinine 0.88 0.50 - 1.05 mg/dL    eGFR 69 >60 mL/min/1.73m*2    Calcium 8.8 8.6 - 10.3 mg/dL    Albumin 3.5 3.4 - 5.0 g/dL    Alkaline Phosphatase 129 33 - 136 U/L    Total Protein 5.8 (L) 6.4 - 8.2 g/dL    AST 8 (L) 9 - 39 U/L    Bilirubin, Total 0.9 0.0 - 1.2 mg/dL    ALT 10 7 - 45 U/L   Type and Screen   Result Value Ref Range    ABO TYPE O     Rh TYPE POS     ANTIBODY SCREEN NEG    Protime-INR   Result Value Ref Range    Protime 14.9 (H) 9.8 - 12.8 seconds    INR 1.3 (H) 0.9 - 1.1   APTT   Result Value Ref Range    aPTT 38 27 - 38 seconds   POCT GLUCOSE   Result Value Ref Range    POCT Glucose 116 (H) 74 - 99 mg/dL        Assessment/Plan     Assessment: Left hip dislocation     Plan as discussed with Dr. Nicole:   -Plan for closed left hip reduction under anesthesia, possible left hip arthroplasty revision (liner exchange) planned for today   -NPO   -DVT prophylaxis   -Pain control     Marcia Talbert, APRN-CNP

## 2024-03-06 NOTE — ED PROCEDURE NOTE
Procedure  Moderate Sedation    Performed by: Juventino Allred DO  Authorized by: Juventino Allred DO    Consent:     Consent obtained:  Verbal and written    Consent given by:  Patient    Risks, benefits, and alternatives were discussed: yes      Risks discussed:  Allergic reaction, dysrhythmia, inadequate sedation, nausea, vomiting, respiratory compromise necessitating ventilatory assistance and intubation, prolonged sedation necessitating reversal and prolonged hypoxia resulting in organ damage    Alternatives discussed:  Analgesia without sedation, anxiolysis and regional anesthesia  Crozier protocol:     Procedure explained and questions answered to patient or proxy's satisfaction: yes      Relevant documents present and verified: yes      Test results available: yes      Imaging studies available: yes      Site/side marked: yes      Immediately prior to procedure, a time out was called: yes      Patient identity confirmed:  Verbally with patient and arm band  Indications:     Procedure performed:  Dislocation reduction    Procedure necessitating sedation performed by:  Physician performing sedation    Intended level of sedation:  Moderate  Pre-sedation assessment:     NPO status caution: unable to specify NPO status      ASA classification: class 2 - patient with mild systemic disease      Mouth openin finger widths    Thyromental distance:  2 finger widths    Mallampati score:  II - soft palate, uvula, fauces visible    Neck mobility: normal      Pre-sedation assessments completed and reviewed: airway patency, anesthesia/sedation history, cardiovascular function, hydration status, mental status, nausea/vomiting, pain level, respiratory function and temperature      History of difficult intubation: no    Immediate pre-procedure details:     Reassessment: Patient reassessed immediately prior to procedure      Reviewed: vital signs      Verified: bag valve mask available, emergency equipment  available, intubation equipment available, IV patency confirmed, oxygen available, reversal medications available and suction available    Procedure details (see MAR for exact dosages):     Preoxygenation:  Nasal cannula    Sedation:  Propofol    Analgesia:  Fentanyl    Intra-procedure monitoring:  Blood pressure monitoring, cardiac monitor, continuous pulse oximetry, continuous capnometry, frequent LOC assessments and frequent vital sign checks    Intra-procedure events: respiratory depression      Intra-procedure management:  Airway repositioning and supplemental oxygen  Post-procedure details:     Attendance: Constant attendance by certified staff until patient recovered      Recovery: Patient returned to pre-procedure baseline      Estimated blood loss (see I/O flowsheets): no      Post-sedation assessments completed and reviewed: airway patency, cardiovascular function, hydration status, pain level, respiratory function and temperature      Specimens recovered:  None    Patient is stable for discharge or admission: yes      Procedure completion:  Tolerated well, no immediate complications  Comments:      Patient did have 1 episode of desaturation the lowest was 88% on nasal cannula airway was repositioned and SpO2 improved to normal.  She had no apneic events.               Juventino Allred,   03/06/24 0129

## 2024-03-06 NOTE — OP NOTE
Attempted Hip Dislocation Total Closed Reduction (L) Operative Note     Date: 3/6/2024  OR Location: [facility] OR    Name: Maricel Tomlin, : 1950, Age: 73 y.o., MRN: [id number], Sex: female    Diagnosis  Pre-op Diagnosis     * Dislocation of left hip, initial encounter (CMS/[facility]) [S73.005A] Post-op Diagnosis     * Dislocation of left hip, initial encounter (CMS/[facility]) [S73.005A]     Procedures  Attempted Hip Dislocation Total Closed Reduction  15377 - IN CLTX POST HIP ARTHRP DISLC REQ ANES      Surgeons      * Elia Ferraro - Primary    Resident/Fellow/Other Assistant:  Surgeon(s) and Role:    Procedure Summary  Anesthesia: General  ASA: III  Anesthesia Staff: Anesthesiologist: Yfn Perry MD  C-AA: CINDY Carpio  Estimated Blood Loss: 0mL  Intra-op Medications: Administrations occurring from 0250 to 0320 on 24:  * No intraprocedure medications in log *           Anesthesia Record               Intraprocedure I/O Totals       None           Specimen: No specimens collected     Staff:   Circulator: Renae Godinez RN  Scrub Person: Cesario Trevino         Drains and/or Catheters: * None in log *    Tourniquet Times:         Implants:     Findings: Posterior dislocated hip    Indications: Maricel Tomlin is an 73 y.o. female who is having surgery for Dislocation of left hip, initial encounter (CMS/[facility]) [S73.005A].  Patient has a prior left total hip arthroplasty by Dr. Nicole.    The patient was seen in the preoperative area. The risks, benefits, complications, treatment options, non-operative alternatives, expected recovery and outcomes were discussed with the patient. The possibilities of reaction to medication, pulmonary aspiration, injury to surrounding structures, bleeding, recurrent infection, the need for additional procedures, failure to diagnose a condition, and creating a complication requiring transfusion or operation were discussed with the patient. The patient concurred with the proposed plan,  giving informed consent.  The site of surgery was properly noted/marked if necessary per policy. The patient has been actively warmed in preoperative area. Preoperative antibiotics are not indicated. Venous thrombosis prophylaxis are not indicated.    Procedure Details: The patient was seen in the preanesthesia area the operative site was marked consent reviewed.  She was then taken back to the surgical suite.  She was transferred onto the OR table.  General anesthesia was then provided by the anesthesia team.  A closed reduction was attempted but was unsuccessful.  Patient was then taken to the PACU in stable condition.  She will require a open reduction.  We will consult the surgeon who did the total hip replacement.  Complications:  None; patient tolerated the procedure well.    Disposition: PACU - hemodynamically stable.  Condition: stable         Additional Details:     Attending Attestation: I was present and scrubbed for the entire procedure.    Elia Ferraro  Phone Number: [phone]

## 2024-03-06 NOTE — H&P
History Of Present Illness  Maricel Tomlin is a 73 y.o. female presenting to emergency department for evaluation of left hip pain.  Patient states that she had her left hip replaced many years ago and has had a hip dislocation in the past.  Patient states that this feels very similar.  She states that she was leaning over to grab something and felt a pop in the left hip.  Patient denies being able to bear weight on that extremity.  Patient denies numbness or tingling.  Patient denies fall or traumatic injury.  Patient arrived via EMS to ED for further evaluation.    In ED, an x-ray of the hip was obtained and shows a left hip arthroplasty dislocation per radiology review.  An IV was placed and patient was given moderate sedation in ED and multiple attempts were made at reduction.  This was unsuccessful.  Orthopedics was called and Dr. Ferraro came to ED.  Patient was taken to OR for reduction which was again also unsuccessful.  Patient's original hip arthroplasty was done by Dr. Mina and further care was deferred to him.  Patient was recovered in PACU and will be admitted under the care of Dr. Adam Martínez who will continue to follow.  I was asked to do H&P and place initial admission orders.  Patient will receive an ORIF per Dr. Mina in the morning.  Labs, urinalysis, EKG ordered.     Past Medical History  A-fib/a flutter, diabetes, hyperlipidemia, osteoarthritis, hypertension, ELLIE, asthma, lumbago  Surgical History  Colonoscopy, laminectomy, left partial nephrectomy, lumbar fusion, PVI ablation, tonsillectomy, adenoidectomy, bilateral hip replacement, left knee replacement, tubular adenoma  Social History  Never smoker, no drug use, no alcohol use  Family History  Reviewed and noncontributory  Allergies  Metformin and Sulfa (sulfonamide antibiotics)    Review of Systems  A 10 point review of systems was completed and negative except what is listed in HPI  Physical Exam  Constitutional:       Appearance:  "Normal appearance.   HENT:      Right Ear: Tympanic membrane normal.      Left Ear: Tympanic membrane normal.      Mouth/Throat:      Mouth: Mucous membranes are dry.   Eyes:      Pupils: Pupils are equal, round, and reactive to light.   Cardiovascular:      Rate and Rhythm: Normal rate and regular rhythm.   Pulmonary:      Effort: Pulmonary effort is normal.      Breath sounds: Normal breath sounds.   Abdominal:      General: Bowel sounds are normal.      Palpations: Abdomen is soft.   Musculoskeletal:      Cervical back: Normal range of motion.      Comments: Left hip pain on palpation/unable to bear weight   Skin:     General: Skin is warm and dry.      Capillary Refill: Capillary refill takes less than 2 seconds.   Neurological:      General: No focal deficit present.      Mental Status: She is alert and oriented to person, place, and time.   Psychiatric:         Mood and Affect: Mood normal.         Behavior: Behavior normal.          Last Recorded Vitals  Blood pressure 145/68, pulse 75, temperature 35.6 °C (96.1 °F), temperature source Temporal, resp. rate 18, height 1.702 m (5' 7\"), weight 113 kg (250 lb), SpO2 98 %.    Relevant Results  XR hip left with pelvis when performed 2 or 3 views    Result Date: 3/6/2024  Interpreted By:  Leandro Puente, STUDY: XR HIP LEFT WITH PELVIS WHEN PERFORMED 2 OR 3 VIEWS; ;  3/6/2024 12:53 am   INDICATION: Signs/Symptoms:possible dislocation.   COMPARISON: 05/14/2016   ACCESSION NUMBER(S): [id number]   ORDERING CLINICIAN: DOUGIE RUVALCABA   FINDINGS: AP radiograph of the pelvis and two views of the left hip: No acute fracture. Status post bilateral total hip arthroplasty. There is posterosuperior dislocation of the left femoral component relative to the acetabular component. There is diffuse osteopenia. Rounded metallic devices projected over the lower lumbar spine. Is mild sacroiliac arthrosis. Slight soft tissue swelling about the left hip.       1. Left hip " arthroplasty dislocation.   Signed by: Leandro Puente 3/6/2024 1:12 AM Dictation workstation:   [id number]  Results for orders placed or performed during the hospital encounter of 03/06/24 (from the past 24 hour(s))   POCT GLUCOSE   Result Value Ref Range    POCT Glucose 159 (H) 74 - 99 mg/dL   CBC   Result Value Ref Range    WBC 16.3 (H) 4.4 - 11.3 x10*3/uL    nRBC 0.0 0.0 - 0.0 /100 WBCs    RBC 4.80 4.00 - 5.20 x10*6/uL    Hemoglobin 13.0 12.0 - 16.0 g/dL    Hematocrit 42.3 36.0 - 46.0 %    MCV 88 80 - 100 fL    MCH 27.1 26.0 - 34.0 pg    MCHC 30.7 (L) 32.0 - 36.0 g/dL    RDW 14.8 (H) 11.5 - 14.5 %    Platelets 289 150 - 450 x10*3/uL   Comprehensive metabolic panel   Result Value Ref Range    Glucose 126 (H) 74 - 99 mg/dL    Sodium 138 136 - 145 mmol/L    Potassium 4.3 3.5 - 5.3 mmol/L    Chloride 106 98 - 107 mmol/L    Bicarbonate 27 21 - 32 mmol/L    Anion Gap 9 (L) 10 - 20 mmol/L    Urea Nitrogen 22 6 - 23 mg/dL    Creatinine 0.88 0.50 - 1.05 mg/dL    eGFR 69 >60 mL/min/1.73m*2    Calcium 8.8 8.6 - 10.3 mg/dL    Albumin 3.5 3.4 - 5.0 g/dL    Alkaline Phosphatase 129 33 - 136 U/L    Total Protein 5.8 (L) 6.4 - 8.2 g/dL    AST 8 (L) 9 - 39 U/L    Bilirubin, Total 0.9 0.0 - 1.2 mg/dL    ALT 10 7 - 45 U/L   Type and Screen   Result Value Ref Range    ABO TYPE O     Rh TYPE POS     ANTIBODY SCREEN NEG    Protime-INR   Result Value Ref Range    Protime 14.9 (H) 9.8 - 12.8 seconds    INR 1.3 (H) 0.9 - 1.1   APTT   Result Value Ref Range    aPTT 38 27 - 38 seconds   POCT GLUCOSE   Result Value Ref Range    POCT Glucose 116 (H) 74 - 99 mg/dL       Assessment/Plan   Maricel is a 73-year-old female patient is alert and oriented x 3 presenting to emergency department for evaluation of left hip pain.  X-ray showed a left hip arthroplasty dislocation.  Patient states that she was bending over and felt a pop in her left hip.  Patient denies fall or trauma.  Multiple attempts were made in ED at reduction and were  unsuccessful.  Dr. Ferraro arrived to ED and took patient to OR where attempts were made at reduction and they were also unsuccessful.  Care was turned over to Dr. Mina who will take patient to the OR in the morning for an ORIF.  Patient was recovered in PACU, labs, urinalysis, EKG ordered and pending.  Patient admitted for further management.    Left hip arthroplasty dislocation  Admit to telemetry per Dr. Adam Martínez  See imaging results above  N.p.o.  Consult Dr. Mina  Labs, urinalysis, EKG ordered  Morphine/Zofran  Tylenol suppository as needed  Continue IV fluids  PT/OT    Hypertension/diabetes/hyperlipidemia/ELLIE/asthma/osteoarthritis/A-fib/A-flutter  N.p.o.  ISS  Hypoglycemia protocol  Telemetry monitoring  Resume home medications after surgery as appropriate    DVT Ppx  SCDs  Hold chemical prophylaxis 2/2 surgical procedure  Bedrest  PT/OT             I spent 25 minutes in the professional and overall care of this patient.      Adam Martínez, DO

## 2024-03-06 NOTE — OP NOTE
CLOSED REDUCTION OF LEFT HIP DISLOCATION OF PROSTHESIS (L) Operative Note     Date: 3/6/2024  OR Location: [facility] OR    Name: Maricel Tomlin, : 1950, Age: 73 y.o., MRN: [id number], Sex: female    Diagnosis  Pre-op Diagnosis     * Dislocation of left hip, initial encounter (CMS/[facility]) [S73.005A] Post-op Diagnosis     * Dislocation of left hip, initial encounter (CMS/[facility]) [S73.005A]     Procedures  CLOSED REDUCTION OF LEFT HIP DISLOCATION OF PROSTHESIS  [address] - [place] [facility]TX HIP DISLOCATION TRAUMATIC REQ ANESTHESIA      Surgeons      * Wesly Nicole - Primary    Resident/Fellow/Other Assistant:  Surgeon(s) and Role: Carmina Garcia    Procedure Summary  Anesthesia: Consult  ASA: III  Anesthesia Staff: Anesthesiologist: Juventino Mejia MD  C-AA: CINDY Oshea  Estimated Blood Loss: 0mL    Indications: 73-year-old who had a left total hip arthroplasty approximately 10 years ago.  She had 1 previous dislocation event but has done well.  She was leaning forward and reached across her body for something under her bed and felt her hip dislocate.  Attempts at reduction had been done in the emergency room as well as attempt in the operating room which were unsuccessful.  Treatment options clued no treatment reviewed decision was made proceed with a repeat closed reduction attempt and potential need for open reduction and revision procedure    Description of procedure: Patient was brought into the operating room general anesthesia was performed.  Closed reduction was obtained with the knee in flexion and adduction and traction applied.  Fluoroscopy in AP and lateral views confirmed reduction of the hip.  Range of motion was done under fluoroscopy.  The hip flexed to 95 degrees and at 90 of flexion internally rotated 40 degrees and externally rotated 45 degrees without instability.  She was placed in a knee immobilizer and taken the recovery room in stable condition.      Intra-op Medications:   Administrations occurring  from 1315 to 1430 on 03/06/24:   Medication Name Total Dose   HYDROmorphone PF (Dilaudid) injection 0.5 mg 0.5 mg   ipratropium-albuteroL (Duo-Neb) 0.5-2.5 mg/3 mL nebulizer solution 3 mL 3 mL   lactated Ringer's infusion Cannot be calculated              Anesthesia Record               Intraprocedure I/O Totals          Intake    lactated Ringer's infusion 200.00 mL    Total Intake 200 mL          Specimen: No specimens collected     Staff:   Circulator: Mer Craft RN  Scrub Person: Julio Mora      Attending Attestation: I performed the procedure.    Wesly Nicole  Phone Number: [phone]

## 2024-03-06 NOTE — CONSULTS
"Reason For Consult  Left hip dislocation    History Of Present Illness  Maricel Tomlin is a 73 y.o. female presenting with left hip dislocation.  The patient has a left total hip replacement done by Dr. Nicole \"many years ago\".  She was sitting on the edge of the bed earlier this evening and reached down to pick something up when the hip dislocated.  She has had 1 prior hip dislocation.  Patient reports of left hip pain and denies any lower extremity numbness or tingling     Past Medical History  She has a past medical history of Other conditions influencing health status, Personal history of malignant neoplasm, unspecified, Personal history of other diseases of the circulatory system, Personal history of other diseases of the musculoskeletal system and connective tissue, Personal history of other diseases of the respiratory system, Personal history of other endocrine, nutritional and metabolic disease, Personal history of other endocrine, nutritional and metabolic disease, Personal history of other malignant neoplasm of kidney, Personal history of other mental and behavioral disorders, and Personal history of other specified conditions.    Surgical History  She has a past surgical history that includes Total hip arthroplasty (05/01/2018); Other surgical history (11/30/2020); Other surgical history (11/30/2020); Other surgical history (11/30/2020); MR angio neck wo IV contrast (9/15/2020); and MR angio head wo IV contrast (9/15/2020).     Social History  She has no history on file for tobacco use, alcohol use, and drug use.    Family History  No family history on file.     Allergies  Metformin and Sulfa (sulfonamide antibiotics)    Review of Systems  Negative except for left hip dislocation     Physical Exam  The left lower extremity is shortened and internally rotated.  There is a posterior surgical hip scar.  Sensation to light touch is grossly intact throughout the lower extremity.  The dorsal pedal pulses 2+.  Ankle " "plantarflexion and dorsiflexion strength are 5/5.     Last Recorded Vitals  Blood pressure 106/56, pulse 66, temperature 36.2 °C (97.2 °F), temperature source Temporal, resp. rate 18, height 1.702 m (5' 7\"), weight 113 kg (250 lb), SpO2 (!) 93 %.    Relevant Results  XR hip left with pelvis when performed 2 or 3 views    Result Date: 3/6/2024  Interpreted By:  Leandro Puente, STUDY: XR HIP LEFT WITH PELVIS WHEN PERFORMED 2 OR 3 VIEWS; ;  3/6/2024 12:53 am   INDICATION: Signs/Symptoms:possible dislocation.   COMPARISON: 05/14/2016   ACCESSION NUMBER(S): [id number]   ORDERING CLINICIAN: DOUGIE RUVALCABA   FINDINGS: AP radiograph of the pelvis and two views of the left hip: No acute fracture. Status post bilateral total hip arthroplasty. There is posterosuperior dislocation of the left femoral component relative to the acetabular component. There is diffuse osteopenia. Rounded metallic devices projected over the lower lumbar spine. Is mild sacroiliac arthrosis. Slight soft tissue swelling about the left hip.       1. Left hip arthroplasty dislocation.   Signed by: Leandro Puente 3/6/2024 1:12 AM Dictation workstation:   [id number]       Assessment/Plan     The patient presents with a prosthetic left posterior hip dislocation.  The ED attempted to reduce the hip but was unsuccessful.  Plan to take her to the operating room under anesthesia to do a closed reduction.  I discussed the risks and benefits of the procedure with the patient.  The risks include but not limited to: Pain, damage to surrounding structures including blood vessels nerves, fracture, failure of the reduction and the need for repeat operative inventions.  The patient understands and accepts these risks.          Elia Ferraro MD    "

## 2024-03-06 NOTE — PROGRESS NOTES
Physical Therapy                 Therapy Communication Note    Patient Name: Maricel Tomlin  MRN: [id number]  Today's Date: 3/6/2024     Discipline: Physical Therapy    Missed Visit Reason:      Missed Time: Cancel    Comment:  Sched. L hip ORIF  Reattempt after surg

## 2024-03-06 NOTE — ANESTHESIA POSTPROCEDURE EVALUATION
Patient: Maricel Tomlin    Procedure Summary       Date: 03/06/24 Room / Location: [facility] OR 07 / [facility]    Anesthesia Start: 1400 Anesthesia Stop:     Procedure: CLOSED REDUCTION OF LEFT HIP DISLOCATION OF PROSTHESIS (Left: Hip) Diagnosis:       Dislocation of left hip, initial encounter (CMS/[facility])      (Dislocation of left hip, initial encounter (CMS/[facility]) [S73.005A])    Surgeons: Wesly Nicole MD Responsible Provider: CINDY Oshea    Anesthesia Type: general ASA Status: 3            Anesthesia Type: general    Vitals Value Taken Time   /78 03/06/24 1436   Temp 37.4 03/06/24 1436   Pulse 75 03/06/24 1436   Resp 16 03/06/24 1436   SpO2 97 03/06/24 1436       Anesthesia Post Evaluation    Patient location during evaluation: PACU  Patient participation: complete - patient participated  Level of consciousness: awake  Pain score: 1  Pain management: adequate  Airway patency: patent  Cardiovascular status: acceptable and hemodynamically stable  Respiratory status: room air  Hydration status: acceptable  Postoperative Nausea and Vomiting: none      No notable events documented.

## 2024-03-06 NOTE — PERIOPERATIVE NURSING NOTE
LT leg immobilizer on. Pt able to wiggle toes, plantar and dorsi flex with good strength. Ice pack applied to LT hip.

## 2024-03-06 NOTE — ED PROVIDER NOTES
EMERGENCY DEPARTMENT ENCOUNTER      Pt Name: Maricel Tolmin  MRN: [id number]  Birthdate 1950  Date of evaluation: 3/6/2024  Provider: Juventino Allred DO    CHIEF COMPLAINT       Chief Complaint   Patient presents with    Hip Pain     Pt states she sat on the edge of her bed to reach under the bed for something and felt her left hip pop out of place. Pt states this has happened before. Pt states pain is 7/10       HISTORY OF PRESENT ILLNESS    Maricel Tomlin is a 73 y.o. female who presents to the emergency department with EMS for suspected left hip dislocation.  Patient states that she had her left hip replaced many years ago and has had a hip dislocation in the past this feels very similar.  She states that she was leaning over her bed to grab something and felt a pop in the left hip.  She feels it is likely dislocated.  Denies any numbness tingling or weakness throughout the extremity.  Denies any fall or closed head injury.  No additional associated injuries.  EMS was called for further evaluation unable to obtain IV in route.          Nursing Notes were reviewed.    REVIEW OF SYSTEMS     CONSTITUTIONAL: Denies fever, sweats, chills.   NEURO: Denies difficulty walking, numbness, weakness, tingling, headache.   HEENT: Denies sore throat, rhinorrhea, changes in vision.   CARDIO: Denies chest pain, palpitations.  PULM: Denies shortness of breath, cough.   GI: Denies abdominal pain, nausea, vomiting, diarrhea, constipation, melena, hematochezia.  : Denies painful urination, frequency, hematuria.   MSK: Endorses hip pain.  SKIN: Denies rash, lesions.   ENDOCRINE: Denies unexpected weight-loss.   HEME: Denies bleeding disorder.     PAST MEDICAL HISTORY     Past Medical History:   Diagnosis Date    Other conditions influencing health status     Right handed    Personal history of malignant neoplasm, unspecified     History of malignant neoplasm    Personal history of other diseases of the circulatory system      History of hypertension    Personal history of other diseases of the musculoskeletal system and connective tissue     History of arthritis    Personal history of other diseases of the respiratory system     History of sinus problem    Personal history of other endocrine, nutritional and metabolic disease     History of diabetes mellitus    Personal history of other endocrine, nutritional and metabolic disease     History of thyroid disease    Personal history of other malignant neoplasm of kidney     History of renal cell carcinoma    Personal history of other mental and behavioral disorders     History of depression    Personal history of other specified conditions     History of bruising easily       SURGICAL HISTORY       Past Surgical History:   Procedure Laterality Date    MR HEAD ANGIO WO IV CONTRAST  9/15/2020    MR HEAD ANGIO WO IV CONTRAST 9/15/2020 [facility]ICAL [facility]    MR NECK ANGIO WO IV CONTRAST  9/15/2020    MR NECK ANGIO WO IV CONTRAST 9/15/2020 [facility] CLINICAL LEGAC[facility]    OTHER SURGICAL HISTORY  11/30/2020    Hip replacement    OTHER SURGICAL HISTORY  11/30/2020    Knee replacement    OTHER SURGICAL HISTORY  11/30/2020    Nephrectomy partial    TOTAL HIP ARTHROPLASTY  05/01/2018    Hip Replacement Right       ALLERGIES     Metformin and Sulfa (sulfonamide antibiotics)    FAMILY HISTORY     No family history on file.     SOCIAL HISTORY       Social History     Socioeconomic History    Marital status:      Spouse name: Not on file    Number of children: Not on file    Years of education: Not on file    Highest education level: Not on file   Occupational History    Not on file   Tobacco Use    Smoking status: Not on file    Smokeless tobacco: Not on file   Substance and Sexual Activity    Alcohol use: Not on file    Drug use: Not on file    Sexual activity: Not on file   Other Topics Concern    Not on file   Social History Narrative    Not on file     Social Determinants of Health     Financial  Resource Strain: Not on file   Food Insecurity: Not on file   Transportation Needs: Not on file   Physical Activity: Not on file   Stress: Not on file   Social Connections: Not on file   Intimate Partner Violence: Not on file   Housing Stability: Not on file       PHYSICAL EXAM   VS: As documented in the triage note from today's date and EMR flowsheet were reviewed.  Gen: Well developed. No acute distress. Seated in bed. Appears nontoxic.   Skin: Warm. Dry. Intact. No rashes or lesions.  Eyes: Pupils equally round and reactive to light. Clear sclera. EOMI.  HENT: Atraumatic appearance. Mucosal membranes moist. No oral lesions, uvula midline, airway patent.  TMs clear bilaterally nares clear bilaterally.  No cervical tenderness or step-offs no evidence of basilar skull fracture.  CV: Regular rate and regular rhythm. S1, S2. No pedal edema. Warm extremities.  Resp: Nonlabored breathing Clear to auscultation bilaterally. No increased work of breathing.   GI: Soft and nontender. No rebound or guarding. Bowel sounds x4 present.   MSK: Symmetric muscle bulk. No joint swelling in the extremities. Compartments are soft. Neurovascularly intact x4 extremities. Radial pulses +2 equal bilaterally.  Slight shortening of the left lower extremity internally rotated.  Neurovascular intact pedal pulses +2 equal bilateral.  Neuro: Alert. CN II - XII intact. Speech fluent. Moving all extremities. No focal deficits.   Psych: Appropriate. Kempt.    DIAGNOSTIC RESULTS   RADIOLOGY:   Non-plain film images such as CT, Ultrasound and MRI are read by the radiologist. Plain radiographic images are visualized and preliminarily interpreted by the emergency physician with the below findings: X-ray imaging remarkable for dislocation of the left hip.  No evidence of fracture.      Interpretation per the Radiologist below, if available at the time of this note:    XR hip left with pelvis when performed 2 or 3 views   Final Result   1. Left hip  arthroplasty dislocation.        Signed by: Leandro Kwame 3/6/2024 1:12 AM   Dictation workstation:   [id number]            ED BEDSIDE ULTRASOUND:   Performed by ED Physician - none    LABS:  Labs Reviewed - No data to display    All other labs were within normal range or not returned as of this dictation.    EMERGENCY DEPARTMENT COURSE/MDM:   Vitals:    I reviewed the patient's triage vitals and they are within normal limits upon arrival.    Due to the above findings the following was ordered fentanyl for pain Zofran for any refractory nausea fluids n.p.o. status and x-ray imaging of the hip.    No additional injuries no indication for additional imaging or blood work at this time.  She did not in fact fall either.    X-ray imaging confirms dislocation of the left hip.  Patient was verbally and written consented for propofol sedation with left hip reduction.    Multiple attempts at reduction myself as well as the JANA.  No success patient did have a brief episode of desaturation although improved with airway repositioning.  Patient did recover adequately.  Neurovascular intact after attempted reduction unsuccessful.  See procedure note for further details.  Orthopedic surgery was contacted Dr. Ferraro who is currently on-call he is agreed to see the patient in consultation.    Patient admitted to Dr. Ferraro service for OR reduction.  Patient did fully recover from anesthesia prior to transfer to operating room.      Hospitalist service Dr. Pete did contact me and reported that Dr. Ferraro was unsuccessful at reduction and patient would need open reduction in the a.m.  Patient currently boarding in PACU apparently Dr. Ferraro attempted to contact Dr. Nicole's orthopedic group although there is no answer.  Patient does not have an admitting physician and ortho has left.  I was able to secure admission to Dr. HANNAH Martínez service on behalf.  JANA notified.    ED Course as of 03/06/24 0329   Wed Mar 06, 2024   0054  My interpretation of left hip x-ray there is a dislocation patient verbally and written consented for propofol sedation with left-sided reduction.  Respiratory called. [MG]      ED Course User Index  [MG] Juventino Allred DO         Diagnoses as of 03/06/24 0329   Dislocation of left hip, initial encounter (CMS/[facility])       Patient was counseled regarding labs, imaging, likely diagnosis, and plan. All questions were answered.     ------------------------------------------------------------------  Information provided by the patient and EMS  Consults orthopedic surgery  Past medical history complicating workup previous hip dislocation  Previous medical records reviewed office visit 2/28/2024  ------------------------------------------------------------------  ED Medications administered this visit:    Medications   fentaNYL PF (Sublimaze) injection 50 mcg (50 mcg intravenous Given 3/6/24 0106)   ondansetron (Zofran) injection 4 mg (4 mg intravenous Given 3/6/24 0106)   sodium chloride 0.9 % bolus 1,000 mL (1,000 mL intravenous New Bag 3/6/24 0110)   propofol (Diprivan) injection (20 mg intravenous Given 3/6/24 0121)   fentaNYL PF (Sublimaze) injection 50 mcg (50 mcg intravenous Given 3/6/24 0157)        Final Impression:   1. Dislocation of left hip, initial encounter (CMS/[facility])          Juventino Allred DO    (Please note that portions of this note were completed with a voice recognition program.  Efforts were made to edit the dictations but occasionally words are mis-transcribed.)          Juventino Allred DO  03/06/24 0345       Juventino Allred DO  03/06/24 0348

## 2024-03-06 NOTE — NURSING NOTE
Dr. Ferraro notified me that this patient will need surgery. I notified the surgery team to come in as requested.

## 2024-03-06 NOTE — PROGRESS NOTES
Occupational Therapy                 Therapy Communication Note    Patient Name: Maricel Tomlin  MRN: [id number]  Today's Date: 3/6/2024     Discipline: Occupational Therapy    Missed Visit Reason: Missed Visit Reason:  (Chart review completed. Pt has L hip arthroplasty dislocation, awaiting L hip ORIF. Bed rest order was placed at this time. Will hold OT eval until after sx.)

## 2024-03-06 NOTE — H&P
History Of Present Illness  Maricel Tomlin is a 73 y.o. female presenting to emergency department for evaluation of left hip pain.  Patient states that she had her left hip replaced many years ago and has had a hip dislocation in the past.  Patient states that this feels very similar.  She states that she was leaning over to grab something and felt a pop in the left hip.  Patient denies being able to bear weight on that extremity.  Patient denies numbness or tingling.  Patient denies fall or traumatic injury.  Patient arrived via EMS to ED for further evaluation.    In ED, an x-ray of the hip was obtained and shows a left hip arthroplasty dislocation per radiology review.  An IV was placed and patient was given moderate sedation in ED and multiple attempts were made at reduction.  This was unsuccessful.  Orthopedics was called and Dr. Ferraro came to ED.  Patient was taken to OR for reduction which was again also unsuccessful.  Patient's original hip arthroplasty was done by Dr. Mina and further care was deferred to him.  Patient was recovered in PACU and will be admitted under the care of Dr. Adam Martínez who will continue to follow.  I was asked to do H&P and place initial admission orders.  Patient will receive an ORIF per Dr. Mina in the morning.  Labs, urinalysis, EKG ordered.     Past Medical History  A-fib/a flutter, diabetes, hyperlipidemia, osteoarthritis, hypertension, ELLIE, asthma, lumbago  Surgical History  Colonoscopy, laminectomy, left partial nephrectomy, lumbar fusion, PVI ablation, tonsillectomy, adenoidectomy, bilateral hip replacement, left knee replacement, tubular adenoma  Social History  Never smoker, no drug use, no alcohol use  Family History  Reviewed and noncontributory  Allergies  Metformin and Sulfa (sulfonamide antibiotics)    Review of Systems  A 10 point review of systems was completed and negative except what is listed in HPI  Physical Exam  Constitutional:       Appearance:  "Normal appearance.   HENT:      Right Ear: Tympanic membrane normal.      Left Ear: Tympanic membrane normal.      Mouth/Throat:      Mouth: Mucous membranes are dry.   Eyes:      Pupils: Pupils are equal, round, and reactive to light.   Cardiovascular:      Rate and Rhythm: Normal rate and regular rhythm.   Pulmonary:      Effort: Pulmonary effort is normal.      Breath sounds: Normal breath sounds.   Abdominal:      General: Bowel sounds are normal.      Palpations: Abdomen is soft.   Musculoskeletal:      Cervical back: Normal range of motion.      Comments: Left hip pain on palpation/unable to bear weight   Skin:     General: Skin is warm and dry.      Capillary Refill: Capillary refill takes less than 2 seconds.   Neurological:      General: No focal deficit present.      Mental Status: She is alert and oriented to person, place, and time.   Psychiatric:         Mood and Affect: Mood normal.         Behavior: Behavior normal.          Last Recorded Vitals  Blood pressure 139/82, pulse 64, temperature 36.4 °C (97.5 °F), resp. rate 18, height 1.702 m (5' 7\"), weight 113 kg (250 lb), SpO2 96 %.    Relevant Results  XR hip left with pelvis when performed 2 or 3 views    Result Date: 3/6/2024  Interpreted By:  Leandro Puente, STUDY: XR HIP LEFT WITH PELVIS WHEN PERFORMED 2 OR 3 VIEWS; ;  3/6/2024 12:53 am   INDICATION: Signs/Symptoms:possible dislocation.   COMPARISON: 05/14/2016   ACCESSION NUMBER(S): [id number]   ORDERING CLINICIAN: DOUGIE RUVALCABA   FINDINGS: AP radiograph of the pelvis and two views of the left hip: No acute fracture. Status post bilateral total hip arthroplasty. There is posterosuperior dislocation of the left femoral component relative to the acetabular component. There is diffuse osteopenia. Rounded metallic devices projected over the lower lumbar spine. Is mild sacroiliac arthrosis. Slight soft tissue swelling about the left hip.       1. Left hip arthroplasty dislocation.   Signed " by: Leandro Puente 3/6/2024 1:12 AM Dictation workstation:   [id number]  Results for orders placed or performed during the hospital encounter of 03/06/24 (from the past 24 hour(s))   POCT GLUCOSE   Result Value Ref Range    POCT Glucose 159 (H) 74 - 99 mg/dL   CBC   Result Value Ref Range    WBC 16.3 (H) 4.4 - 11.3 x10*3/uL    nRBC 0.0 0.0 - 0.0 /100 WBCs    RBC 4.80 4.00 - 5.20 x10*6/uL    Hemoglobin 13.0 12.0 - 16.0 g/dL    Hematocrit 42.3 36.0 - 46.0 %    MCV 88 80 - 100 fL    MCH 27.1 26.0 - 34.0 pg    MCHC 30.7 (L) 32.0 - 36.0 g/dL    RDW 14.8 (H) 11.5 - 14.5 %    Platelets 289 150 - 450 x10*3/uL       Assessment/Plan   Maricel is a 73-year-old female patient is alert and oriented x 3 presenting to emergency department for evaluation of left hip pain.  X-ray showed a left hip arthroplasty dislocation.  Patient states that she was bending over and felt a pop in her left hip.  Patient denies fall or trauma.  Multiple attempts were made in ED at reduction and were unsuccessful.  Dr. Ferraro arrived to ED and took patient to OR where attempts were made at reduction and they were also unsuccessful.  Care was turned over to Dr. Mina who will take patient to the OR in the morning for an ORIF.  Patient was recovered in PACU, labs, urinalysis, EKG ordered and pending.  Patient admitted for further management.    Left hip arthroplasty dislocation  Admit to telemetry per Dr. Adam Martínez  See imaging results above  N.p.o.  Consult Dr. Mina  Labs, urinalysis, EKG ordered  Morphine/Zofran  Tylenol suppository as needed  Continue IV fluids  PT/OT    Hypertension/diabetes/hyperlipidemia/ELLIE/asthma/osteoarthritis/A-fib/A-flutter  N.p.o.  ISS  Hypoglycemia protocol  Telemetry monitoring  Resume home medications after surgery as appropriate    DVT Ppx  SCDs  Hold chemical prophylaxis 2/2 surgical procedure  Bedrest  PT/OT             I spent 25 minutes in the professional and overall care of this  patient.      Kylie Osborne, APRN-CNP

## 2024-03-06 NOTE — ANESTHESIA PREPROCEDURE EVALUATION
Patient: Maricel Tomlin    Procedure Information       Date/Time: 03/06/24 0250    Procedure: Hip Dislocation Total Closed Reduction (Left: Hip)    Location: [facility] 02 / [facility]    Surgeons: Elia Ferraro MD            Relevant Problems   Anesthesia (within normal limits)   (-) PONV (postoperative nausea and vomiting)      Cardiovascular   (+) Atrial fibrillation (CMS/HCC)   (+) HTN (hypertension)   (+) Hyperlipidemia      Endocrine   (+) Diabetes mellitus, type 2 (CMS/HCC)   (+) Hypothyroidism      Pulmonary   (+) Asthma   (+) Chronic obstructive pulmonary disease (CMS/HCC)       Clinical information reviewed:    Allergies                NPO Detail:  No data recorded     Physical Exam    Airway  Mallampati: II  TM distance: >3 FB  Neck ROM: full     Cardiovascular - normal exam  Rhythm: regular  Rate: normal     Dental    Pulmonary - normal exam     Abdominal            Anesthesia Plan    History of general anesthesia?: yes  History of complications of general anesthesia?: no    ASA 3     MAC   (NPO >8 hours)  Education provided regarding risk of obstructive sleep apnea.  intravenous induction   Postoperative administration of opioids is intended.  Trial extubation is planned.  Anesthetic plan and risks discussed with patient.    Plan discussed with CRNA, CAA and attending.

## 2024-03-07 ENCOUNTER — APPOINTMENT (OUTPATIENT)
Dept: RADIOLOGY | Facility: HOSPITAL | Age: 74
DRG: 560 | End: 2024-03-07
Payer: MEDICARE

## 2024-03-07 ENCOUNTER — HOSPITAL ENCOUNTER (INPATIENT)
Facility: HOSPITAL | Age: 74
LOS: 6 days | Discharge: HOME | DRG: 949 | End: 2024-03-13
Attending: PHYSICAL MEDICINE & REHABILITATION | Admitting: PHYSICAL MEDICINE & REHABILITATION
Payer: MEDICARE

## 2024-03-07 VITALS
TEMPERATURE: 98.2 F | HEIGHT: 67 IN | WEIGHT: 250 LBS | RESPIRATION RATE: 18 BRPM | SYSTOLIC BLOOD PRESSURE: 117 MMHG | OXYGEN SATURATION: 92 % | DIASTOLIC BLOOD PRESSURE: 58 MMHG | HEART RATE: 95 BPM | BODY MASS INDEX: 39.24 KG/M2

## 2024-03-07 DIAGNOSIS — N39.0 URINARY TRACT INFECTION WITHOUT HEMATURIA, SITE UNSPECIFIED: ICD-10-CM

## 2024-03-07 DIAGNOSIS — S73.005S HIP DISLOCATION, LEFT, SEQUELA: ICD-10-CM

## 2024-03-07 DIAGNOSIS — S73.005D UNSPECIFIED DISLOCATION OF LEFT HIP, SUBSEQUENT ENCOUNTER: Primary | ICD-10-CM

## 2024-03-07 DIAGNOSIS — S73.005D DISLOCATION OF LEFT HIP, SUBSEQUENT ENCOUNTER: Primary | ICD-10-CM

## 2024-03-07 LAB
ANION GAP SERPL CALC-SCNC: 10 MMOL/L (ref 10–20)
APPEARANCE UR: CLEAR
BACTERIA #/AREA URNS AUTO: ABNORMAL /HPF
BILIRUB UR STRIP.AUTO-MCNC: NEGATIVE MG/DL
BUN SERPL-MCNC: 11 MG/DL (ref 6–23)
CALCIUM SERPL-MCNC: 8.5 MG/DL (ref 8.6–10.3)
CHLORIDE SERPL-SCNC: 102 MMOL/L (ref 98–107)
CO2 SERPL-SCNC: 29 MMOL/L (ref 21–32)
COLOR UR: YELLOW
CREAT SERPL-MCNC: 0.76 MG/DL (ref 0.5–1.05)
EGFRCR SERPLBLD CKD-EPI 2021: 82 ML/MIN/1.73M*2
ERYTHROCYTE [DISTWIDTH] IN BLOOD BY AUTOMATED COUNT: 14.8 % (ref 11.5–14.5)
GLUCOSE BLD MANUAL STRIP-MCNC: 172 MG/DL (ref 74–99)
GLUCOSE SERPL-MCNC: 141 MG/DL (ref 74–99)
GLUCOSE UR STRIP.AUTO-MCNC: NEGATIVE MG/DL
HCT VFR BLD AUTO: 38.7 % (ref 36–46)
HGB BLD-MCNC: 12 G/DL (ref 12–16)
KETONES UR STRIP.AUTO-MCNC: NEGATIVE MG/DL
LEUKOCYTE ESTERASE UR QL STRIP.AUTO: NEGATIVE
MCH RBC QN AUTO: 26.7 PG (ref 26–34)
MCHC RBC AUTO-ENTMCNC: 31 G/DL (ref 32–36)
MCV RBC AUTO: 86 FL (ref 80–100)
MUCOUS THREADS #/AREA URNS AUTO: ABNORMAL /LPF
NITRITE UR QL STRIP.AUTO: POSITIVE
NRBC BLD-RTO: 0 /100 WBCS (ref 0–0)
PH UR STRIP.AUTO: 5 [PH]
PLATELET # BLD AUTO: 261 X10*3/UL (ref 150–450)
POTASSIUM SERPL-SCNC: 4.5 MMOL/L (ref 3.5–5.3)
PROT UR STRIP.AUTO-MCNC: NEGATIVE MG/DL
RBC # BLD AUTO: 4.49 X10*6/UL (ref 4–5.2)
RBC # UR STRIP.AUTO: NEGATIVE /UL
RBC #/AREA URNS AUTO: ABNORMAL /HPF
SODIUM SERPL-SCNC: 136 MMOL/L (ref 136–145)
SP GR UR STRIP.AUTO: 1.02
UROBILINOGEN UR STRIP.AUTO-MCNC: <2 MG/DL
WBC # BLD AUTO: 13.1 X10*3/UL (ref 4.4–11.3)
WBC #/AREA URNS AUTO: ABNORMAL /HPF

## 2024-03-07 PROCEDURE — 2500000004 HC RX 250 GENERAL PHARMACY W/ HCPCS (ALT 636 FOR OP/ED): Performed by: INTERNAL MEDICINE

## 2024-03-07 PROCEDURE — 82374 ASSAY BLOOD CARBON DIOXIDE: CPT

## 2024-03-07 PROCEDURE — 1180000001 HC REHAB PRIVATE ROOM DAILY

## 2024-03-07 PROCEDURE — 73610 X-RAY EXAM OF ANKLE: CPT | Mod: LT

## 2024-03-07 PROCEDURE — 82947 ASSAY GLUCOSE BLOOD QUANT: CPT

## 2024-03-07 PROCEDURE — 85027 COMPLETE CBC AUTOMATED: CPT

## 2024-03-07 PROCEDURE — 97163 PT EVAL HIGH COMPLEX 45 MIN: CPT | Mod: GP

## 2024-03-07 PROCEDURE — 2500000002 HC RX 250 W HCPCS SELF ADMINISTERED DRUGS (ALT 637 FOR MEDICARE OP, ALT 636 FOR OP/ED): Mod: MUE | Performed by: PHYSICAL MEDICINE & REHABILITATION

## 2024-03-07 PROCEDURE — 99239 HOSP IP/OBS DSCHRG MGMT >30: CPT | Performed by: INTERNAL MEDICINE

## 2024-03-07 PROCEDURE — 81003 URINALYSIS AUTO W/O SCOPE: CPT

## 2024-03-07 PROCEDURE — 2500000002 HC RX 250 W HCPCS SELF ADMINISTERED DRUGS (ALT 637 FOR MEDICARE OP, ALT 636 FOR OP/ED): Performed by: PHYSICAL MEDICINE & REHABILITATION

## 2024-03-07 PROCEDURE — 2500000001 HC RX 250 WO HCPCS SELF ADMINISTERED DRUGS (ALT 637 FOR MEDICARE OP)

## 2024-03-07 PROCEDURE — 97530 THERAPEUTIC ACTIVITIES: CPT | Mod: GP,CQ

## 2024-03-07 PROCEDURE — 2500000001 HC RX 250 WO HCPCS SELF ADMINISTERED DRUGS (ALT 637 FOR MEDICARE OP): Performed by: PHYSICAL MEDICINE & REHABILITATION

## 2024-03-07 PROCEDURE — 2500000004 HC RX 250 GENERAL PHARMACY W/ HCPCS (ALT 636 FOR OP/ED): Performed by: PHYSICAL MEDICINE & REHABILITATION

## 2024-03-07 PROCEDURE — 36415 COLL VENOUS BLD VENIPUNCTURE: CPT

## 2024-03-07 PROCEDURE — 2500000004 HC RX 250 GENERAL PHARMACY W/ HCPCS (ALT 636 FOR OP/ED)

## 2024-03-07 PROCEDURE — 2500000001 HC RX 250 WO HCPCS SELF ADMINISTERED DRUGS (ALT 637 FOR MEDICARE OP): Performed by: INTERNAL MEDICINE

## 2024-03-07 PROCEDURE — 97165 OT EVAL LOW COMPLEX 30 MIN: CPT | Mod: GO

## 2024-03-07 RX ORDER — LEVOTHYROXINE SODIUM 125 UG/1
125 TABLET ORAL
Status: DISCONTINUED | OUTPATIENT
Start: 2024-03-08 | End: 2024-03-08

## 2024-03-07 RX ORDER — CARVEDILOL 12.5 MG/1
12.5 TABLET ORAL
Status: DISCONTINUED | OUTPATIENT
Start: 2024-03-08 | End: 2024-03-13 | Stop reason: HOSPADM

## 2024-03-07 RX ORDER — BISACODYL 5 MG
10 TABLET, DELAYED RELEASE (ENTERIC COATED) ORAL DAILY PRN
Status: DISCONTINUED | OUTPATIENT
Start: 2024-03-07 | End: 2024-03-13 | Stop reason: HOSPADM

## 2024-03-07 RX ORDER — ENOXAPARIN SODIUM 100 MG/ML
40 INJECTION SUBCUTANEOUS EVERY 12 HOURS SCHEDULED
Status: DISCONTINUED | OUTPATIENT
Start: 2024-03-07 | End: 2024-03-08

## 2024-03-07 RX ORDER — ACETAMINOPHEN 160 MG/5ML
650 SOLUTION ORAL EVERY 4 HOURS PRN
Status: DISCONTINUED | OUTPATIENT
Start: 2024-03-07 | End: 2024-03-07 | Stop reason: HOSPADM

## 2024-03-07 RX ORDER — BUPROPION HYDROCHLORIDE 150 MG/1
300 TABLET ORAL DAILY
Status: DISCONTINUED | OUTPATIENT
Start: 2024-03-08 | End: 2024-03-13 | Stop reason: HOSPADM

## 2024-03-07 RX ORDER — ACETAMINOPHEN 325 MG/1
650 TABLET ORAL EVERY 4 HOURS PRN
Status: DISCONTINUED | OUTPATIENT
Start: 2024-03-07 | End: 2024-03-07 | Stop reason: HOSPADM

## 2024-03-07 RX ORDER — FLUTICASONE PROPIONATE AND SALMETEROL XINAFOATE 230; 21 UG/1; UG/1
2 AEROSOL, METERED RESPIRATORY (INHALATION)
COMMUNITY
Start: 2024-03-08

## 2024-03-07 RX ORDER — ZOLPIDEM TARTRATE 5 MG/1
5 TABLET ORAL NIGHTLY
Status: DISCONTINUED | OUTPATIENT
Start: 2024-03-07 | End: 2024-03-13 | Stop reason: HOSPADM

## 2024-03-07 RX ORDER — BISACODYL 10 MG/1
10 SUPPOSITORY RECTAL DAILY PRN
Status: DISCONTINUED | OUTPATIENT
Start: 2024-03-07 | End: 2024-03-13 | Stop reason: HOSPADM

## 2024-03-07 RX ORDER — ATORVASTATIN CALCIUM 40 MG/1
40 TABLET, FILM COATED ORAL DAILY
Status: DISCONTINUED | OUTPATIENT
Start: 2024-03-08 | End: 2024-03-13 | Stop reason: HOSPADM

## 2024-03-07 RX ORDER — OXYCODONE HYDROCHLORIDE 5 MG/1
5 TABLET ORAL EVERY 6 HOURS PRN
Qty: 15 TABLET | Refills: 0
Start: 2024-03-07 | End: 2024-03-13 | Stop reason: HOSPADM

## 2024-03-07 RX ORDER — TRAMADOL HYDROCHLORIDE 50 MG/1
50 TABLET ORAL EVERY 6 HOURS PRN
Start: 2024-03-07

## 2024-03-07 RX ORDER — GABAPENTIN 300 MG/1
900 CAPSULE ORAL 2 TIMES DAILY
Status: DISCONTINUED | OUTPATIENT
Start: 2024-03-07 | End: 2024-03-13 | Stop reason: HOSPADM

## 2024-03-07 RX ORDER — ACETAMINOPHEN 325 MG/1
1000 TABLET ORAL EVERY 6 HOURS PRN
Status: DISCONTINUED | OUTPATIENT
Start: 2024-03-07 | End: 2024-03-13 | Stop reason: HOSPADM

## 2024-03-07 RX ORDER — ALUMINUM HYDROXIDE, MAGNESIUM HYDROXIDE, AND SIMETHICONE 2400; 240; 2400 MG/30ML; MG/30ML; MG/30ML
30 SUSPENSION ORAL EVERY 6 HOURS PRN
Status: DISCONTINUED | OUTPATIENT
Start: 2024-03-07 | End: 2024-03-13 | Stop reason: HOSPADM

## 2024-03-07 RX ORDER — TRAMADOL HYDROCHLORIDE 50 MG/1
50 TABLET ORAL EVERY 6 HOURS PRN
Status: DISCONTINUED | OUTPATIENT
Start: 2024-03-07 | End: 2024-03-13 | Stop reason: HOSPADM

## 2024-03-07 RX ORDER — LOSARTAN POTASSIUM 50 MG/1
50 TABLET ORAL DAILY
Status: DISCONTINUED | OUTPATIENT
Start: 2024-03-08 | End: 2024-03-13 | Stop reason: HOSPADM

## 2024-03-07 RX ORDER — SENNOSIDES 8.6 MG/1
1 TABLET ORAL NIGHTLY
Status: DISCONTINUED | OUTPATIENT
Start: 2024-03-07 | End: 2024-03-13 | Stop reason: HOSPADM

## 2024-03-07 RX ORDER — TALC
3 POWDER (GRAM) TOPICAL NIGHTLY PRN
Status: DISCONTINUED | OUTPATIENT
Start: 2024-03-07 | End: 2024-03-13 | Stop reason: HOSPADM

## 2024-03-07 RX ORDER — OXYCODONE HYDROCHLORIDE 5 MG/1
5 TABLET ORAL EVERY 6 HOURS PRN
Status: DISCONTINUED | OUTPATIENT
Start: 2024-03-07 | End: 2024-03-13 | Stop reason: HOSPADM

## 2024-03-07 RX ADMIN — CARVEDILOL 12.5 MG: 12.5 TABLET, FILM COATED ORAL at 18:00

## 2024-03-07 RX ADMIN — OXYCODONE HYDROCHLORIDE 5 MG: 5 TABLET ORAL at 00:06

## 2024-03-07 RX ADMIN — ENOXAPARIN SODIUM 40 MG: 40 INJECTION SUBCUTANEOUS at 23:18

## 2024-03-07 RX ADMIN — ACETAMINOPHEN 650 MG: 160 SOLUTION ORAL at 14:29

## 2024-03-07 RX ADMIN — HYDROMORPHONE HYDROCHLORIDE 1 MG: 2 INJECTION, SOLUTION INTRAMUSCULAR; INTRAVENOUS; SUBCUTANEOUS at 05:29

## 2024-03-07 RX ADMIN — GABAPENTIN 900 MG: 300 CAPSULE ORAL at 09:19

## 2024-03-07 RX ADMIN — CARVEDILOL 12.5 MG: 12.5 TABLET, FILM COATED ORAL at 09:19

## 2024-03-07 RX ADMIN — TRAMADOL HYDROCHLORIDE 50 MG: 50 TABLET, COATED ORAL at 14:17

## 2024-03-07 RX ADMIN — LOSARTAN POTASSIUM 50 MG: 50 TABLET, FILM COATED ORAL at 09:19

## 2024-03-07 RX ADMIN — SENNOSIDES 8.6 MG: 8.6 TABLET, FILM COATED ORAL at 23:19

## 2024-03-07 RX ADMIN — LEVOTHYROXINE SODIUM 125 MCG: 125 TABLET ORAL at 06:13

## 2024-03-07 RX ADMIN — ZOLPIDEM TARTRATE 5 MG: 5 TABLET, COATED ORAL at 23:20

## 2024-03-07 RX ADMIN — BUPROPION HYDROCHLORIDE 300 MG: 150 TABLET, FILM COATED, EXTENDED RELEASE ORAL at 09:19

## 2024-03-07 RX ADMIN — GABAPENTIN 900 MG: 300 CAPSULE ORAL at 23:19

## 2024-03-07 RX ADMIN — ATORVASTATIN CALCIUM 40 MG: 40 TABLET, FILM COATED ORAL at 09:19

## 2024-03-07 RX ADMIN — TRAMADOL HYDROCHLORIDE 50 MG: 50 TABLET, COATED ORAL at 18:00

## 2024-03-07 RX ADMIN — SODIUM CHLORIDE, POTASSIUM CHLORIDE, SODIUM LACTATE AND CALCIUM CHLORIDE 50 ML/HR: 600; 310; 30; 20 INJECTION, SOLUTION INTRAVENOUS at 00:03

## 2024-03-07 RX ADMIN — TRAMADOL HYDROCHLORIDE 50 MG: 50 TABLET, COATED ORAL at 09:19

## 2024-03-07 ASSESSMENT — COGNITIVE AND FUNCTIONAL STATUS - GENERAL
TOILETING: TOTAL
WALKING IN HOSPITAL ROOM: A LOT
STANDING UP FROM CHAIR USING ARMS: A LOT
CLIMB 3 TO 5 STEPS WITH RAILING: TOTAL
MOBILITY SCORE: 10
TURNING FROM BACK TO SIDE WHILE IN FLAT BAD: A LOT
HELP NEEDED FOR BATHING: A LOT
CLIMB 3 TO 5 STEPS WITH RAILING: TOTAL
DRESSING REGULAR UPPER BODY CLOTHING: A LOT
STANDING UP FROM CHAIR USING ARMS: A LOT
MOVING FROM LYING ON BACK TO SITTING ON SIDE OF FLAT BED WITH BEDRAILS: A LOT
MOBILITY SCORE: 11
DRESSING REGULAR LOWER BODY CLOTHING: TOTAL
DAILY ACTIVITIY SCORE: 14
WALKING IN HOSPITAL ROOM: TOTAL
MOVING FROM LYING ON BACK TO SITTING ON SIDE OF FLAT BED WITH BEDRAILS: A LOT
MOVING TO AND FROM BED TO CHAIR: A LOT
MOVING TO AND FROM BED TO CHAIR: A LOT
TURNING FROM BACK TO SIDE WHILE IN FLAT BAD: A LOT

## 2024-03-07 ASSESSMENT — PAIN SCALES - GENERAL
PAINLEVEL_OUTOF10: 0 - NO PAIN
PAINLEVEL_OUTOF10: 0 - NO PAIN
PAINLEVEL_OUTOF10: 9
PAINLEVEL_OUTOF10: 3
PAINLEVEL_OUTOF10: 8
PAINLEVEL_OUTOF10: 9
PAINLEVEL_OUTOF10: 1
PAINLEVEL_OUTOF10: 3
PAINLEVEL_OUTOF10: 0 - NO PAIN
PAINLEVEL_OUTOF10: 7

## 2024-03-07 ASSESSMENT — PATIENT HEALTH QUESTIONNAIRE - PHQ9
1. LITTLE INTEREST OR PLEASURE IN DOING THINGS: NOT AT ALL
SUM OF ALL RESPONSES TO PHQ9 QUESTIONS 1 & 2: 0
2. FEELING DOWN, DEPRESSED OR HOPELESS: NOT AT ALL

## 2024-03-07 ASSESSMENT — PAIN - FUNCTIONAL ASSESSMENT
PAIN_FUNCTIONAL_ASSESSMENT: 0-10

## 2024-03-07 ASSESSMENT — ACTIVITIES OF DAILY LIVING (ADL)
BATHING: INDEPENDENT
TOILETING: INDEPENDENT
DRESSING: INDEPENDENT
BOWEL: CONTINENT
BLADDER: CONTINENT
DOMESTIC_CHORES: INDEPENDENT

## 2024-03-07 ASSESSMENT — PAIN DESCRIPTION - LOCATION: LOCATION: HEAD

## 2024-03-07 NOTE — PROGRESS NOTES
Physical Therapy    Physical Therapy Evaluation    Patient Name: Maricel Tomlin  MRN: [id number]  Today's Date: 3/7/2024   Time Calculation  Start Time: 0929  Stop Time: 1000  Time Calculation (min): 31 min    Assessment/Plan   PT Assessment  End of Session Communication: Bedside nurse  End of Session Patient Position: Up in chair, Alarm off, not on at start of session  IP OR SWING BED PT PLAN  Inpatient or Swing Bed: Inpatient  PT Plan  PT Frequency: 5 times per week  PT Discharge Recommendations: High intensity level of continued care  PT Recommended Transfer Status: Assist x2  PT - OK to Discharge: Yes      Subjective   General Visit Information:  General  Reason for Referral: L hip dislocation/closed reduction  Referred By: Corey  Past Medical History Relevant to Rehab: DM,HTN,ELLIE,A-fib,L TKR,BRANDY. THR,lami/fusion  Missed Visit: Yes  Missed Visit Reason: Patient placed on medical hold  Prior to Session Communication: Bedside nurse  Patient Position Received: Bed, 2 rail up, Alarm off, not on at start of session  Home Living:  Home Living  Type of Home: House  Lives With: Spouse  Home Adaptive Equipment: Walker rolling or standard, Cane, Reacher, Sock aid, Long-handled shoehorn  Home Layout: One level  Home Access:  (5 stairs/2 rails)  Prior Level of Function:  Prior Function Per Pt/Caregiver Report  Level of [place]: Independent with ADLs and functional transfers, Independent with homemaking with ambulation  Ambulatory Assistance: Independent  Prior Function Comments: 5 deg DF LLE; 10 deg inversion/eversion  Precautions:  Precautions  LE Weight Bearing Status: Weight Bearing as Tolerated (LLE)  Braces Applied:  (LLE immobilizer)  Precautions Comment: THR  Vital Signs:       Objective   Pain:  Pain Assessment  Pain Score: 9  Cognition:  Cognition  Overall Cognitive Status: Within Functional Limits    General Assessments:    Functional Assessments:  Bed Mobility  Bed Mobility: Yes (max assist x  1)    Transfers  Transfer: Yes (mod-max assist x 2)    Ambulation/Gait Training  Ambulation/Gait Training Performed: Yes (bed to chair mod assist x 1;wh walker)  Extremity/Trunk Assessments:    Outcome Measures:  [facility] Basic Mobility  Turning from your back to your side while in a flat bed without using bedrails: A lot  Moving from lying on your back to sitting on the side of a flat bed without using bedrails: A lot  Moving to and from bed to chair (including a wheelchair): A lot  Standing up from a chair using your arms (e.g. wheelchair or bedside chair): A lot  To walk in hospital room: Total  Climbing 3-5 steps with railing: Total  Basic Mobility - Total Score: 10    Encounter Problems       Encounter Problems (Active)       PT Problem       bed mob       Start:  03/07/24    Expected End:  03/28/24       Min assist bed mob         العراقي       Start:  03/07/24    Expected End:  03/28/24       Min assist العراقي         gait       Start:  03/07/24    Expected End:  03/28/24       10 ft>  walker/min x 1            Pain - Adult              Education Documentation  Mobility Training, taught by Christian Bernal, PT at 3/7/2024 12:24 PM.  Learner: Patient  Readiness: Acceptance  Method: Explanation  Response: Verbalizes Understanding    ADL Training, taught by Christian Bernal PT at 3/7/2024 12:24 PM.  Learner: Patient  Readiness: Acceptance  Method: Explanation  Response: Verbalizes Understanding    Education Comments  No comments found.

## 2024-03-07 NOTE — PREADMISSION SCREENING NOTE
Physical Medicine and Rehabilitation  Preadmission Screening    Rehab Physician's Review and Admission Determination:  Maricel Tomlin is a 73 y.o. female who needs acute inpatient rehabilitation in order to achieve the functional goals outlined below. She requires close rehabilitation physician monitoring and management due to her complex medical conditions and co-morbidities with the primary indication of:  Rehab Admission Indication: Orthopaedic Disorders: Orthopaedic Disorders: 0008.9 Other Orthopedic left hip location . Comorbid conditions that will impact course of rehabilitation: A-fib/a flutter, diabetes, hyperlipidemia, osteoarthritis, hypertension, ELLIE, asthma, lumbago . She requires 24-hour rehabilitation nursing to manage bowel and bladder function, skin care, nutrition and fluid intake, pulmonary hygiene, pain control, safety, medication management, and patient/family goals. In addition, rehabilitation nursing will reiterate and reinforce therapy skills and equipment use, including ADLs, as well as provide education to the patient and family. Maricel Tomlin is willing to participate in and is able to tolerate the proposed plan of care.    History of Present Illness: Maricel Tomlin is a 73 y.o. female presenting to emergency department at [facility] on 3/6/24 for evaluation of left hip pain.  Patient states that she had her left hip replaced many years ago and has had a hip dislocation in the past.  Patient states that this feels very similar.  She states that she was leaning over to grab something and felt a pop in the left hip.  Patient denies being able to bear weight on that extremity.  Patient denies numbness or tingling.  Patient denies fall or traumatic injury.  Patient arrived via EMS to ED for further evaluation.     In ED, an x-ray of the hip was obtained and shows a left hip arthroplasty dislocation per radiology review.  An IV was placed and patient was given moderate sedation in ED and multiple attempts  were made at reduction.  This was unsuccessful.  Orthopedics was called and Dr. Ferraro came to ED.  Patient was taken to OR for reduction which was again also unsuccessful.  Patient's original hip arthroplasty was done by Dr. Mina and further care was deferred to him.       On 3/6/24 taken to OR  Pre-op Diagnosis     * Dislocation of left hip, initial encounter (CMS/[facility]) [S73.005A] Post-op Diagnosis     * Dislocation of left hip, initial encounter (CMS/[facility]) [S73.005A]      Procedures  CLOSED REDUCTION OF LEFT HIP DISLOCATION OF PROSTHESIS  07769 - TX CLTX HIP DISLOCATION TRAUMATIC REQ ANESTHESIA    Surgeons      * Wesly Nicole - Primary     Past Medical History  A-fib/a flutter, diabetes, hyperlipidemia, osteoarthritis, hypertension, ELLIE, asthma, lumbago  Surgical History  Colonoscopy, laminectomy, left partial nephrectomy, lumbar fusion, PVI ablation, tonsillectomy, adenoidectomy, bilateral hip replacement, left knee replacement, tubular adenoma  Social History  Never smoker, no drug use, no alcohol use  Family History  Reviewed and noncontributory  Allergies  Metformin and Sulfa (sulfonamide antibiotics)     Current medications  Lipitor 40 mg po daily  Welbutrin  mg po Daily  Coreg 12.5 mg po 2 times daily with meals  Neurontin 900 mg po 2 times a day  Synthroid 125 mcg po daily  Cozaaar 50 mg po daily  Ambien 5 mg po nightly.     Patient with a LLE  immobilizer on and allowed weight bearing as tolerated      Prior Functional Status:  Lives with Spouse, bed/bath 1st floor   Self-Care: independent  Ambulation: independent  Stairs: home access 5 stairs /2 rails   Cognition: alert and oriented x3  Wheelchair: NA  Devices: owns w walker, cane, high toilet, reachers, sock aide, stall shower with seat and grab bars    Current Functional Status:  Eating: set up  Oral hygiene: mod a  Toileting: max a x2  Bathing: max a  Upper body dressing: mod a  Lower body dressing: ,ax a  Bed Mobility: max a  Transfers:  mod-max a x2  Bladder and Bowel: continent  Cognition: alert and oriented x3    Rehabilitation Goals and Plan:  Expected level of improvement: SBA/CGA  Likelihood of reaching these goals: excellent.  Therapy treatments needed to achieve these goals: physical therapy, occupational therapy, , nursing, and aide.  Barriers to achieving these goals: Pain   Expected length of stay: 10 day(s)    When medically stable, anticipated discharge disposition: home with outpatient therapy  The potential to achieve that is excellent.

## 2024-03-07 NOTE — PROGRESS NOTES
03/07/24 1213   Discharge Planning   Living Arrangements Spouse/significant other   Support Systems Spouse/significant other   Assistance Needed Independent, patient does drive   Type of Residence Private residence  (one story home)   Number of Stairs to Enter Residence 5   Home or Post Acute Services Post acute facilities (Rehab/SNF/etc)   Type of Post Acute Facility Services Rehab   Patient expects to be discharged to: [facility]     Met with patient at bedside, introduced self and role on care transitions team. Admission assessment completed with patient. Insurance, address and contact information verified with patient. Therapy with recommendations for high intensity therapy at discharge. Patient preference is [facility]. Received update from Paulette with [facility] that they can accept patient at discharge.     Addendum 1417: received update that [facility] acute rehab can accept patient at 4PM today. Patient updated at bedside. Bedside nurse updated.

## 2024-03-07 NOTE — DISCHARGE SUMMARY
Discharge Diagnosis  Hip dislocation, left (CMS/HCC)    Issues Requiring Follow-Up  Left hip dislocation     Discharge Meds     Your medication list        START taking these medications        Instructions Last Dose Given Next Dose Due   oxyCODONE 5 mg immediate release tablet  Commonly known as: Roxicodone      Take 1 tablet (5 mg) by mouth every 6 hours if needed for severe pain (7 - 10).       traMADol 50 mg tablet  Commonly known as: Ultram      Take 1 tablet (50 mg) by mouth every 6 hours if needed for moderate pain (4 - 6).              CONTINUE taking these medications        Instructions Last Dose Given Next Dose Due   acetaminophen 500 mg tablet  Commonly known as: Tylenol           acetylcysteine 200 mg/mL (20 %) nebulizer solution  Commonly known as: Mucomyst           atorvastatin 40 mg tablet  Commonly known as: Lipitor           Atrovent HFA 17 mcg/actuation inhaler  Generic drug: ipratropium           buPROPion  mg 24 hr tablet  Commonly known as: Wellbutrin XL           carvedilol 12.5 mg tablet  Commonly known as: Coreg           gabapentin 300 mg capsule  Commonly known as: Neurontin           levothyroxine 125 mcg tablet  Commonly known as: Synthroid, Levoxyl           losartan 50 mg tablet  Commonly known as: Cozaar           zolpidem CR 12.5 mg ER tablet  Commonly known as: Ambien CR                     Where to Get Your Medications        Information about where to get these medications is not yet available    Ask your nurse or doctor about these medications  oxyCODONE 5 mg immediate release tablet  traMADol 50 mg tablet         Test Results Pending At Discharge  Pending Labs       No current pending labs.            Hospital Course   Maricel Tomlin is a 73 y.o. female presenting to emergency department for evaluation of left hip pain.  Patient states that she had her left hip replaced many years ago and has had a hip dislocation in the past.  Patient states that this feels very similar.   She states that she was leaning over to grab something and felt a pop in the left hip.  Patient denies being able to bear weight on that extremity.  Patient denies numbness or tingling.  Patient denies fall or traumatic injury.  Patient arrived via EMS to ED for further evaluation.     In ED, an x-ray of the hip was obtained and shows a left hip arthroplasty dislocation per radiology review.  An IV was placed and patient was given moderate sedation in ED and multiple attempts were made at reduction.  This was unsuccessful.  Orthopedics was called and Dr. Ferraro came to ED.  Patient was taken to OR for reduction which was again also unsuccessful.  Patient's original hip arthroplasty was done by Dr. Mina and further care was deferred to him.      A second attempt at closed reduction was successful. Patient evaluated by PT/OT who recommended Acute Rehab. Patient discharged.     Pertinent Physical Exam At Time of Discharge  Physical Exam  Physical Exam  Constitutional:       Appearance: Normal appearance.   HENT:      Right Ear: Tympanic membrane normal.      Left Ear: Tympanic membrane normal.      Mouth/Throat:      Mouth: Mucous membranes are dry.   Eyes:      Pupils: Pupils are equal, round, and reactive to light.   Cardiovascular:      Rate and Rhythm: Normal rate and regular rhythm.   Pulmonary:      Effort: Pulmonary effort is normal.      Breath sounds: Normal breath sounds.   Abdominal:      General: Bowel sounds are normal.      Palpations: Abdomen is soft.   Musculoskeletal:      Cervical back: Normal range of motion.      Comments: Left hip pain on palpation/unable to bear weight   Skin:     General: Skin is warm and dry.      Capillary Refill: Capillary refill takes less than 2 seconds.   Neurological:      General: No focal deficit present.      Mental Status: She is alert and oriented to person, place, and time.   Psychiatric:         Mood and Affect: Mood normal.         Behavior: Behavior normal.       Outpatient Follow-Up  No future appointments.      Adam Martínez, DO

## 2024-03-07 NOTE — PROGRESS NOTES
Physical Therapy    Physical Therapy Treatment    Patient Name: Maricel Tomlin  MRN: [id number]  Today's Date: 3/7/2024  Time Calculation  Start Time: 1040  Stop Time: 1050  Time Calculation (min): 10 min       Assessment/Plan         PT Plan  PT Frequency: 5 times per week  PT Discharge Recommendations: High intensity level of continued care  PT Recommended Transfer Status: Assist x2  PT - OK to Discharge: Yes      General Visit Information:   PT  Visit  PT Received On: 03/07/24  General  Patient Position Received: Up in chair, Alarm off, not on at start of session  General Comment:  (Assisted RN to get pt from chair to bed.  Supine HOB elevated, call bell in reach.)    Subjective   Precautions:   Precautions  Precautions Comment: THR precautions, LLE knee immobilizer, WBAT     Objective               Treatments:  Bed Mobility  Bed Mobility: Yes (Pt performed sit to sup with BLE MaxAx1.)    Ambulation/Gait Training  Ambulation/Gait Training Performed: Yes (Pt amb ~2' from chair to bed with FWW and ModAx2.)  Transfers  Transfer: Yes (Pt  performed sit to stand with FWW and MaxAx2 and stand to sit with FWW and ModAx2; v/c required for proper hand placement safety.)    Outcome Measures:  [facility] Basic Mobility  Turning from your back to your side while in a flat bed without using bedrails: A lot  Moving from lying on your back to sitting on the side of a flat bed without using bedrails: A lot  Moving to and from bed to chair (including a wheelchair): A lot  Standing up from a chair using your arms (e.g. wheelchair or bedside chair): A lot  To walk in hospital room: A lot  Climbing 3-5 steps with railing: Total  Basic Mobility - Total Score: 11    Education Documentation  No documentation found.  Education Comments  No comments found.        OP EDUCATION:       Encounter Problems       Encounter Problems (Active)       PT Problem       bed mob       Start:  03/07/24    Expected End:  03/28/24       Min assist bed mob          العراق[place]       Start:  03/07/24    Expected End:  03/28/24       Min assist العراقي         gait       Start:  03/07/24    Expected End:  03/28/24       10 ft> wh walker/min x 1            Pain - Adult

## 2024-03-07 NOTE — PROGRESS NOTES
Occupational Therapy    Evaluation    Patient Name: Maricel Tomlin  MRN: [id number]  Today's Date: 3/7/2024  Time Calculation  Start Time: 0928  Stop Time: 0957  Time Calculation (min): 29 min    Assessment  IP OT Assessment  Prognosis: Good  End of Session Communication: Bedside nurse  End of Session Patient Position: Up in chair, Alarm off, not on at start of session (call light in reach)    Plan:  Treatment Interventions: ADL retraining, Functional transfer training, UE strengthening/ROM, Endurance training, Neuromuscular reeducation, Compensatory technique education  OT Frequency: 5 times per week  OT Discharge Recommendations: High intensity level of continued care  OT - OK to Discharge: Yes (from an O.T. standpoint)    Subjective     Current Problem:  1. Dislocation of left hip, initial encounter (CMS/[facility])  Case Request Operating Room: Hip Dislocation Total Closed Reduction    Case Request Operating Room: Hip Dislocation Total Closed Reduction    Case Request Operating Room: Closed Reduction Hip, Revision Arthroplasty Total Hip    Case Request Operating Room: Closed Reduction Hip, Revision Arthroplasty Total Hip          General:  General  Reason for Referral: OT eval & treat for ADLs/safety (3/6/24: closed reduction of L hip dislocation of prosthesis)  Referred By: Wesly Nicole  Past Medical History Relevant to Rehab: 3/6/24: Left hip dislocation, leaned over to grab something, felt a pop; denies fall.   PMH:  A fib, depression, DM, HTN, renal cell CA, L THR, ELLIE, asthma, lumbago, laminectomy/fusion  Prior to Session Communication: Bedside nurse  Patient Position Received: Bed, 2 rail up  General Comment: Patient initially denies falls; later admits to multiple falls at home    Precautions:  Precautions Comment: THR precautions, LLE knee immobilizer, WBAT         Pain:  Pain Assessment  Pain Assessment: 0-10  Pain Score: 9  Pain Location:  (Left foot, lateral aspect)  Pain Interventions:  (knee immobilizer  adjusted for comfort)    Objective     Cognition:  Overall Cognitive Status:  (WFL; min cues for safety/hand placement.)        Home Living:  Home Living Comments: with , bed/bath 1st floor; Independent ADLs, transfers & mobility without device; Shared IADLs with . Owns cane, walker and rollator, high toilet, reachers, sock aide, stall shower with seat and grab bar.       ADL:  Grooming Assistance: Stand by  UE Dressing Assistance: Moderate  LE Dressing Assistance: Total  Toileting Assistance with Device: Total  ADL Comments: patient will need to use adaptive equipment for LE ADLs    Activity Tolerance:  Endurance:  (Limited secondary to pain)    Bed Mobility/Transfers:   Bed Mobility  Bed Mobility:  (max assist supine to sit with rail)  Transfers  Transfer:  (1st trial: max assist x 2 sit to stand from edge of bed, pulling up on walker with therapists stabilizing walker; tolerates up to 5 seconds standing, reports severe weakness and needed to sit;  2nd trial: mod-max assist x 2 sit to stand.)    Ambulation/Gait Training:  Functional Mobility  Functional Mobility Performed:  (mod assist for balance with wheeled walker; left foot internally rotated which patient reports is baseline s/p remote THR.)      Sensation:  Sensation Comment: chronic numbness/tingling         Extremities: RUE   RUE :  (Chronic limitation RUE; AROM grossly 30 degrees shoulder flexion, 1/4 range ER/IR, WFL elbow-distally;  MMT 2-/5 proximally, 4-/5 distally.) and LUE   LUE:  (AROM 95 degrees shoulder flexion, WFL ER/IR, WFL elbow-distally;  MMT 3-/5 proximall, 4/5 distally)    Outcome Measures: [facility] Daily Activity  Putting on and taking off regular lower body clothing: Total  Bathing (including washing, rinsing, drying): A lot  Putting on and taking off regular upper body clothing: A lot  Toileting, which includes using toilet, bedpan or urinal: Total  Taking care of personal grooming such as brushing teeth: None  Eating Meals:  None  Daily Activity - Total Score: 14           EDUCATION:  Education  Individual(s) Educated: Patient  Education Provided: Diagnosis & Precautions, Fall precautons, POC discussed and agreed upon  Patient/Caregiver Demonstrated Understanding: yes  Education Documentation  ADL Training, taught by Dora Rosales OT at 3/7/2024 11:08 AM.  Learner: Patient  Readiness: Acceptance  Method: Explanation, Demonstration  Response: Verbalizes Understanding    Education Comments  No comments found.        Goals:   Encounter Problems       Encounter Problems (Active)       OT Goals       Increase LE dressing to min assist with adaptive equipment       Start:  03/07/24    Expected End:  03/21/24            Increase functional transfers to/from bed, chair & commode to min assist with DME for safety        Start:  03/07/24    Expected End:  03/21/24            Demonstrate compliance to post op precautions and safety techs  during ADLs        Start:  03/07/24    Expected End:  03/21/24            Increase functional mobility to min assist with DME for safety        Start:  03/07/24    Expected End:  03/21/24

## 2024-03-08 LAB
ANION GAP SERPL CALC-SCNC: 10 MMOL/L (ref 10–20)
BUN SERPL-MCNC: 14 MG/DL (ref 6–23)
CALCIUM SERPL-MCNC: 8.6 MG/DL (ref 8.6–10.3)
CHLORIDE SERPL-SCNC: 100 MMOL/L (ref 98–107)
CO2 SERPL-SCNC: 28 MMOL/L (ref 21–32)
CREAT SERPL-MCNC: 0.81 MG/DL (ref 0.5–1.05)
EGFRCR SERPLBLD CKD-EPI 2021: 76 ML/MIN/1.73M*2
ERYTHROCYTE [DISTWIDTH] IN BLOOD BY AUTOMATED COUNT: 14.6 % (ref 11.5–14.5)
GLUCOSE SERPL-MCNC: 150 MG/DL (ref 74–99)
HCT VFR BLD AUTO: 35.6 % (ref 36–46)
HGB BLD-MCNC: 11.4 G/DL (ref 12–16)
MCH RBC QN AUTO: 27.4 PG (ref 26–34)
MCHC RBC AUTO-ENTMCNC: 32 G/DL (ref 32–36)
MCV RBC AUTO: 86 FL (ref 80–100)
NRBC BLD-RTO: 0 /100 WBCS (ref 0–0)
PLATELET # BLD AUTO: 223 X10*3/UL (ref 150–450)
POTASSIUM SERPL-SCNC: 4.2 MMOL/L (ref 3.5–5.3)
RBC # BLD AUTO: 4.16 X10*6/UL (ref 4–5.2)
SODIUM SERPL-SCNC: 134 MMOL/L (ref 136–145)
WBC # BLD AUTO: 10.9 X10*3/UL (ref 4.4–11.3)

## 2024-03-08 PROCEDURE — 97166 OT EVAL MOD COMPLEX 45 MIN: CPT | Mod: GO

## 2024-03-08 PROCEDURE — 36415 COLL VENOUS BLD VENIPUNCTURE: CPT | Performed by: PHYSICAL MEDICINE & REHABILITATION

## 2024-03-08 PROCEDURE — 2500000002 HC RX 250 W HCPCS SELF ADMINISTERED DRUGS (ALT 637 FOR MEDICARE OP, ALT 636 FOR OP/ED): Mod: MUE | Performed by: PHYSICAL MEDICINE & REHABILITATION

## 2024-03-08 PROCEDURE — 2500000004 HC RX 250 GENERAL PHARMACY W/ HCPCS (ALT 636 FOR OP/ED): Performed by: PHYSICAL MEDICINE & REHABILITATION

## 2024-03-08 PROCEDURE — 99222 1ST HOSP IP/OBS MODERATE 55: CPT

## 2024-03-08 PROCEDURE — 1180000001 HC REHAB PRIVATE ROOM DAILY

## 2024-03-08 PROCEDURE — 97530 THERAPEUTIC ACTIVITIES: CPT | Mod: GO

## 2024-03-08 PROCEDURE — 85027 COMPLETE CBC AUTOMATED: CPT | Performed by: PHYSICAL MEDICINE & REHABILITATION

## 2024-03-08 PROCEDURE — 82374 ASSAY BLOOD CARBON DIOXIDE: CPT | Performed by: PHYSICAL MEDICINE & REHABILITATION

## 2024-03-08 PROCEDURE — 97535 SELF CARE MNGMENT TRAINING: CPT | Mod: GO

## 2024-03-08 PROCEDURE — 2500000001 HC RX 250 WO HCPCS SELF ADMINISTERED DRUGS (ALT 637 FOR MEDICARE OP)

## 2024-03-08 PROCEDURE — 2500000002 HC RX 250 W HCPCS SELF ADMINISTERED DRUGS (ALT 637 FOR MEDICARE OP, ALT 636 FOR OP/ED): Mod: MUE

## 2024-03-08 PROCEDURE — 97162 PT EVAL MOD COMPLEX 30 MIN: CPT | Mod: GP

## 2024-03-08 PROCEDURE — 99024 POSTOP FOLLOW-UP VISIT: CPT

## 2024-03-08 PROCEDURE — 97110 THERAPEUTIC EXERCISES: CPT | Mod: GP

## 2024-03-08 PROCEDURE — 97530 THERAPEUTIC ACTIVITIES: CPT | Mod: GP

## 2024-03-08 PROCEDURE — 2500000002 HC RX 250 W HCPCS SELF ADMINISTERED DRUGS (ALT 637 FOR MEDICARE OP, ALT 636 FOR OP/ED)

## 2024-03-08 PROCEDURE — 2500000002 HC RX 250 W HCPCS SELF ADMINISTERED DRUGS (ALT 637 FOR MEDICARE OP, ALT 636 FOR OP/ED): Performed by: PHYSICAL MEDICINE & REHABILITATION

## 2024-03-08 PROCEDURE — 2500000001 HC RX 250 WO HCPCS SELF ADMINISTERED DRUGS (ALT 637 FOR MEDICARE OP): Performed by: PHYSICAL MEDICINE & REHABILITATION

## 2024-03-08 PROCEDURE — 97116 GAIT TRAINING THERAPY: CPT | Mod: GP

## 2024-03-08 PROCEDURE — 94640 AIRWAY INHALATION TREATMENT: CPT

## 2024-03-08 RX ORDER — AZELASTINE 1 MG/ML
2 SPRAY, METERED NASAL 2 TIMES DAILY
Status: DISCONTINUED | OUTPATIENT
Start: 2024-03-08 | End: 2024-03-13 | Stop reason: HOSPADM

## 2024-03-08 RX ORDER — FLUTICASONE FUROATE AND VILANTEROL 100; 25 UG/1; UG/1
1 POWDER RESPIRATORY (INHALATION) DAILY
Status: DISCONTINUED | OUTPATIENT
Start: 2024-03-08 | End: 2024-03-13 | Stop reason: HOSPADM

## 2024-03-08 RX ORDER — MONTELUKAST SODIUM 10 MG/1
1 TABLET ORAL NIGHTLY
COMMUNITY
Start: 2023-08-31

## 2024-03-08 RX ORDER — MONTELUKAST SODIUM 10 MG/1
10 TABLET ORAL NIGHTLY
Status: DISCONTINUED | OUTPATIENT
Start: 2024-03-08 | End: 2024-03-13 | Stop reason: HOSPADM

## 2024-03-08 RX ORDER — ESCITALOPRAM OXALATE 10 MG/1
20 TABLET ORAL DAILY
Status: DISCONTINUED | OUTPATIENT
Start: 2024-03-08 | End: 2024-03-13 | Stop reason: HOSPADM

## 2024-03-08 RX ORDER — ESCITALOPRAM OXALATE 20 MG/1
20 TABLET ORAL DAILY
COMMUNITY
Start: 2018-07-19

## 2024-03-08 RX ORDER — PANTOPRAZOLE SODIUM 40 MG/1
40 TABLET, DELAYED RELEASE ORAL
COMMUNITY
Start: 2024-02-23

## 2024-03-08 RX ORDER — BENZONATATE 200 MG/1
200 CAPSULE ORAL EVERY 8 HOURS PRN
COMMUNITY
Start: 2024-01-31

## 2024-03-08 RX ORDER — ALBUTEROL SULFATE 90 UG/1
2 AEROSOL, METERED RESPIRATORY (INHALATION) EVERY 6 HOURS PRN
Status: DISCONTINUED | OUTPATIENT
Start: 2024-03-08 | End: 2024-03-13 | Stop reason: HOSPADM

## 2024-03-08 RX ORDER — BUDESONIDE, GLYCOPYRROLATE, AND FORMOTEROL FUMARATE 160; 9; 4.8 UG/1; UG/1; UG/1
2 AEROSOL, METERED RESPIRATORY (INHALATION) 2 TIMES DAILY
COMMUNITY
Start: 2024-01-31

## 2024-03-08 RX ORDER — BENZONATATE 100 MG/1
200 CAPSULE ORAL EVERY 8 HOURS PRN
Status: DISCONTINUED | OUTPATIENT
Start: 2024-03-08 | End: 2024-03-13 | Stop reason: HOSPADM

## 2024-03-08 RX ORDER — PANTOPRAZOLE SODIUM 40 MG/1
40 TABLET, DELAYED RELEASE ORAL
Status: DISCONTINUED | OUTPATIENT
Start: 2024-03-08 | End: 2024-03-13 | Stop reason: HOSPADM

## 2024-03-08 RX ORDER — AZELASTINE 1 MG/ML
2 SPRAY, METERED NASAL 2 TIMES DAILY
COMMUNITY
Start: 2023-09-05

## 2024-03-08 RX ORDER — ALBUTEROL SULFATE 90 UG/1
2 AEROSOL, METERED RESPIRATORY (INHALATION) EVERY 6 HOURS PRN
COMMUNITY
Start: 2019-02-11

## 2024-03-08 RX ADMIN — TRAMADOL HYDROCHLORIDE 50 MG: 50 TABLET, COATED ORAL at 07:51

## 2024-03-08 RX ADMIN — OXYCODONE HYDROCHLORIDE 5 MG: 5 TABLET ORAL at 16:55

## 2024-03-08 RX ADMIN — GABAPENTIN 900 MG: 300 CAPSULE ORAL at 21:00

## 2024-03-08 RX ADMIN — ESCITALOPRAM OXALATE 20 MG: 10 TABLET ORAL at 10:18

## 2024-03-08 RX ADMIN — GABAPENTIN 900 MG: 300 CAPSULE ORAL at 07:53

## 2024-03-08 RX ADMIN — LOSARTAN POTASSIUM 50 MG: 50 TABLET, FILM COATED ORAL at 10:15

## 2024-03-08 RX ADMIN — LEVOTHYROXINE SODIUM 125 MCG: 125 TABLET ORAL at 06:08

## 2024-03-08 RX ADMIN — ENOXAPARIN SODIUM 40 MG: 40 INJECTION SUBCUTANEOUS at 07:53

## 2024-03-08 RX ADMIN — SENNOSIDES 8.6 MG: 8.6 TABLET, FILM COATED ORAL at 21:00

## 2024-03-08 RX ADMIN — MONTELUKAST 10 MG: 10 TABLET, FILM COATED ORAL at 21:00

## 2024-03-08 RX ADMIN — ATORVASTATIN CALCIUM 40 MG: 40 TABLET, FILM COATED ORAL at 07:53

## 2024-03-08 RX ADMIN — TIOTROPIUM BROMIDE INHALATION SPRAY 2 PUFF: 3.12 SPRAY, METERED RESPIRATORY (INHALATION) at 06:56

## 2024-03-08 RX ADMIN — ZOLPIDEM TARTRATE 5 MG: 5 TABLET, COATED ORAL at 21:00

## 2024-03-08 RX ADMIN — OXYCODONE HYDROCHLORIDE 5 MG: 5 TABLET ORAL at 10:15

## 2024-03-08 RX ADMIN — BUPROPION HYDROCHLORIDE 300 MG: 150 TABLET, FILM COATED, EXTENDED RELEASE ORAL at 07:53

## 2024-03-08 RX ADMIN — RIVAROXABAN 20 MG: 20 TABLET, FILM COATED ORAL at 16:55

## 2024-03-08 RX ADMIN — OXYCODONE HYDROCHLORIDE 5 MG: 5 TABLET ORAL at 03:16

## 2024-03-08 RX ADMIN — CARVEDILOL 12.5 MG: 12.5 TABLET, FILM COATED ORAL at 16:55

## 2024-03-08 RX ADMIN — CARVEDILOL 12.5 MG: 12.5 TABLET, FILM COATED ORAL at 10:15

## 2024-03-08 RX ADMIN — FLUTICASONE FUROATE AND VILANTEROL TRIFENATATE 1 PUFF: 100; 25 POWDER RESPIRATORY (INHALATION) at 14:35

## 2024-03-08 RX ADMIN — PANTOPRAZOLE SODIUM 40 MG: 40 TABLET, DELAYED RELEASE ORAL at 10:18

## 2024-03-08 SDOH — SOCIAL STABILITY: SOCIAL INSECURITY: WERE YOU ABLE TO COMPLETE ALL THE BEHAVIORAL HEALTH SCREENINGS?: YES

## 2024-03-08 SDOH — SOCIAL STABILITY: SOCIAL INSECURITY
WITHIN THE LAST YEAR, HAVE YOU BEEN KICKED, HIT, SLAPPED, OR OTHERWISE PHYSICALLY HURT BY YOUR PARTNER OR EX-PARTNER?: NO

## 2024-03-08 SDOH — ECONOMIC STABILITY: HOUSING INSECURITY
IN THE LAST 12 MONTHS, WAS THERE A TIME WHEN YOU DID NOT HAVE A STEADY PLACE TO SLEEP OR SLEPT IN A SHELTER (INCLUDING NOW)?: NO

## 2024-03-08 SDOH — SOCIAL STABILITY: SOCIAL INSECURITY: DO YOU FEEL ANYONE HAS EXPLOITED OR TAKEN ADVANTAGE OF YOU FINANCIALLY OR OF YOUR PERSONAL PROPERTY?: NO

## 2024-03-08 SDOH — HEALTH STABILITY: MENTAL HEALTH
STRESS IS WHEN SOMEONE FEELS TENSE, NERVOUS, ANXIOUS, OR CAN'T SLEEP AT NIGHT BECAUSE THEIR MIND IS TROUBLED. HOW STRESSED ARE YOU?: NOT AT ALL

## 2024-03-08 SDOH — ECONOMIC STABILITY: TRANSPORTATION INSECURITY
IN THE PAST 12 MONTHS, HAS LACK OF TRANSPORTATION KEPT YOU FROM MEETINGS, WORK, OR FROM GETTING THINGS NEEDED FOR DAILY LIVING?: NO

## 2024-03-08 SDOH — SOCIAL STABILITY: SOCIAL NETWORK: ARE YOU MARRIED, WIDOWED, DIVORCED, SEPARATED, NEVER MARRIED, OR LIVING WITH A PARTNER?: MARRIED

## 2024-03-08 SDOH — ECONOMIC STABILITY: HOUSING INSECURITY: IN THE LAST 12 MONTHS, HOW MANY PLACES HAVE YOU LIVED?: 1

## 2024-03-08 SDOH — ECONOMIC STABILITY: FOOD INSECURITY: WITHIN THE PAST 12 MONTHS, THE FOOD YOU BOUGHT JUST DIDN'T LAST AND YOU DIDN'T HAVE MONEY TO GET MORE.: NEVER TRUE

## 2024-03-08 SDOH — ECONOMIC STABILITY: INCOME INSECURITY: IN THE PAST 12 MONTHS, HAS THE ELECTRIC, GAS, OIL, OR WATER COMPANY THREATENED TO SHUT OFF SERVICE IN YOUR HOME?: NO

## 2024-03-08 SDOH — ECONOMIC STABILITY: FOOD INSECURITY: WITHIN THE PAST 12 MONTHS, YOU WORRIED THAT YOUR FOOD WOULD RUN OUT BEFORE YOU GOT MONEY TO BUY MORE.: NEVER TRUE

## 2024-03-08 SDOH — SOCIAL STABILITY: SOCIAL NETWORK
IN A TYPICAL WEEK, HOW MANY TIMES DO YOU TALK ON THE PHONE WITH FAMILY, FRIENDS, OR NEIGHBORS?: MORE THAN THREE TIMES A WEEK

## 2024-03-08 SDOH — SOCIAL STABILITY: SOCIAL NETWORK: HOW OFTEN DO YOU ATTEND CHURCH OR RELIGIOUS SERVICES?: MORE THAN 4 TIMES PER YEAR

## 2024-03-08 SDOH — HEALTH STABILITY: PHYSICAL HEALTH: ON AVERAGE, HOW MANY MINUTES DO YOU ENGAGE IN EXERCISE AT THIS LEVEL?: 120 MIN

## 2024-03-08 SDOH — SOCIAL STABILITY: SOCIAL INSECURITY: WITHIN THE LAST YEAR, HAVE YOU BEEN HUMILIATED OR EMOTIONALLY ABUSED IN OTHER WAYS BY YOUR PARTNER OR EX-PARTNER?: NO

## 2024-03-08 SDOH — ECONOMIC STABILITY: INCOME INSECURITY: IN THE LAST 12 MONTHS, WAS THERE A TIME WHEN YOU WERE NOT ABLE TO PAY THE MORTGAGE OR RENT ON TIME?: NO

## 2024-03-08 SDOH — SOCIAL STABILITY: SOCIAL NETWORK
DO YOU BELONG TO ANY CLUBS OR ORGANIZATIONS SUCH AS CHURCH GROUPS UNIONS, FRATERNAL OR ATHLETIC GROUPS, OR SCHOOL GROUPS?: NO

## 2024-03-08 SDOH — ECONOMIC STABILITY: TRANSPORTATION INSECURITY
IN THE PAST 12 MONTHS, HAS THE LACK OF TRANSPORTATION KEPT YOU FROM MEDICAL APPOINTMENTS OR FROM GETTING MEDICATIONS?: NO

## 2024-03-08 SDOH — SOCIAL STABILITY: SOCIAL INSECURITY: WITHIN THE LAST YEAR, HAVE YOU BEEN AFRAID OF YOUR PARTNER OR EX-PARTNER?: NO

## 2024-03-08 SDOH — SOCIAL STABILITY: SOCIAL INSECURITY: DOES ANYONE TRY TO KEEP YOU FROM HAVING/CONTACTING OTHER FRIENDS OR DOING THINGS OUTSIDE YOUR HOME?: NO

## 2024-03-08 SDOH — SOCIAL STABILITY: SOCIAL INSECURITY
WITHIN THE LAST YEAR, HAVE TO BEEN RAPED OR FORCED TO HAVE ANY KIND OF SEXUAL ACTIVITY BY YOUR PARTNER OR EX-PARTNER?: NO

## 2024-03-08 SDOH — SOCIAL STABILITY: SOCIAL INSECURITY: DO YOU FEEL UNSAFE GOING BACK TO THE PLACE WHERE YOU ARE LIVING?: NO

## 2024-03-08 SDOH — SOCIAL STABILITY: SOCIAL INSECURITY: HAS ANYONE EVER THREATENED TO HURT YOUR FAMILY OR YOUR PETS?: NO

## 2024-03-08 SDOH — SOCIAL STABILITY: SOCIAL INSECURITY: ARE THERE ANY APPARENT SIGNS OF INJURIES/BEHAVIORS THAT COULD BE RELATED TO ABUSE/NEGLECT?: NO

## 2024-03-08 SDOH — SOCIAL STABILITY: SOCIAL INSECURITY: ABUSE: ADULT

## 2024-03-08 SDOH — HEALTH STABILITY: PHYSICAL HEALTH: ON AVERAGE, HOW MANY DAYS PER WEEK DO YOU ENGAGE IN MODERATE TO STRENUOUS EXERCISE (LIKE A BRISK WALK)?: 1 DAY

## 2024-03-08 SDOH — SOCIAL STABILITY: SOCIAL NETWORK: HOW OFTEN DO YOU ATTENT MEETINGS OF THE CLUB OR ORGANIZATION YOU BELONG TO?: NEVER

## 2024-03-08 SDOH — SOCIAL STABILITY: SOCIAL INSECURITY: HAVE YOU HAD THOUGHTS OF HARMING ANYONE ELSE?: NO

## 2024-03-08 SDOH — ECONOMIC STABILITY: INCOME INSECURITY: HOW HARD IS IT FOR YOU TO PAY FOR THE VERY BASICS LIKE FOOD, HOUSING, MEDICAL CARE, AND HEATING?: NOT HARD AT ALL

## 2024-03-08 SDOH — SOCIAL STABILITY: SOCIAL NETWORK: HOW OFTEN DO YOU GET TOGETHER WITH FRIENDS OR RELATIVES?: ONCE A WEEK

## 2024-03-08 SDOH — SOCIAL STABILITY: SOCIAL INSECURITY: ARE YOU OR HAVE YOU BEEN THREATENED OR ABUSED PHYSICALLY, EMOTIONALLY, OR SEXUALLY BY ANYONE?: NO

## 2024-03-08 ASSESSMENT — ENCOUNTER SYMPTOMS
WHEEZING: 1
FATIGUE: 0
SORE THROAT: 0
COUGH: 1
HEADACHES: 1
DIFFICULTY URINATING: 1
CHILLS: 0
ABDOMINAL PAIN: 0
DIARRHEA: 0
DIZZINESS: 1
BACK PAIN: 1
NAUSEA: 0
SHORTNESS OF BREATH: 0

## 2024-03-08 ASSESSMENT — PAIN SCALES - GENERAL
PAINLEVEL_OUTOF10: 5 - MODERATE PAIN
PAINLEVEL_OUTOF10: 6
PAINLEVEL_OUTOF10: 8
PAINLEVEL_OUTOF10: 8
PAINLEVEL_OUTOF10: 6
PAINLEVEL_OUTOF10: 0 - NO PAIN
PAINLEVEL_OUTOF10: 5 - MODERATE PAIN
PAINLEVEL_OUTOF10: 5 - MODERATE PAIN

## 2024-03-08 ASSESSMENT — ACTIVITIES OF DAILY LIVING (ADL)
ADL_ASSISTANCE: INDEPENDENT
LACK_OF_TRANSPORTATION: NO
BATHING_ASSISTANCE: MODERATE
HOME_MANAGEMENT_TIME_ENTRY: 15

## 2024-03-08 ASSESSMENT — LIFESTYLE VARIABLES
SUBSTANCE_ABUSE_PAST_12_MONTHS: NO
AUDIT-C TOTAL SCORE: 2
SKIP TO QUESTIONS 9-10: 0
HOW MANY STANDARD DRINKS CONTAINING ALCOHOL DO YOU HAVE ON A TYPICAL DAY: PATIENT DOES NOT DRINK
PRESCIPTION_ABUSE_PAST_12_MONTHS: NO
HOW OFTEN DO YOU HAVE 6 OR MORE DRINKS ON ONE OCCASION: LESS THAN MONTHLY
HOW OFTEN DO YOU HAVE A DRINK CONTAINING ALCOHOL: MONTHLY OR LESS
AUDIT-C TOTAL SCORE: 2

## 2024-03-08 ASSESSMENT — PAIN - FUNCTIONAL ASSESSMENT
PAIN_FUNCTIONAL_ASSESSMENT: 0-10

## 2024-03-08 ASSESSMENT — BRIEF INTERVIEW FOR MENTAL STATUS (BIMS)
INITIAL REPETITION OF BED BLUE SOCK - FIRST ATTEMPT: 3
COGNITIVE PATTERN ASSESSMENT USED: STAFF ASSESSMENT
ASKED TO RECALL BED: YES, NO CUE REQUIRED
BIMS SUMMARY SCORE: 15
ASKED TO RECALL BLUE: YES, NO CUE REQUIRED
WHAT MONTH IS IT: ACCURATE WITHIN 5 DAYS
WHAT DAY OF THE WEEK IS IT: CORRECT
ASKED TO RECALL SOCK: YES, NO CUE REQUIRED
WHAT YEAR IS IT: CORRECT
GENERAL MEMORY AND RECALL ABILITY: CURRENT SEASON;LOCATION OF OWN ROOM;STAFF NAMES AND FACES;RECOGNIZES APPROPRIATE HEALTHCARE SETTING

## 2024-03-08 ASSESSMENT — COLUMBIA-SUICIDE SEVERITY RATING SCALE - C-SSRS
2. HAVE YOU ACTUALLY HAD ANY THOUGHTS OF KILLING YOURSELF?: NO
6. HAVE YOU EVER DONE ANYTHING, STARTED TO DO ANYTHING, OR PREPARED TO DO ANYTHING TO END YOUR LIFE?: NO
1. IN THE PAST MONTH, HAVE YOU WISHED YOU WERE DEAD OR WISHED YOU COULD GO TO SLEEP AND NOT WAKE UP?: NO

## 2024-03-08 NOTE — CARE PLAN
The patient's goals for the shift include pain free    The clinical goals for the shift include improve mobility       Yes

## 2024-03-08 NOTE — CONSULTS
Reason For Consult  Left hip dislocation - transfer to  rehab floor    History Of Present Illness  Maricel Tomlin is a 73 y.o. female has a past medical history of diabetes, a-fib/a-flutter, hyperlipidemia, HTN, osteoarthritis, ELLIE, and asthma. Presented to the [facility]  3/6 after a left hip dislocation at home. Left hip was replaced per Dr. Nicole in 2018. Patient states that she was sitting on the end of her bed, and leaned over to grab something off the floor and felt a pop in her hip. She was unable to weight bear on left lower extremity. EMS was called and transferred patient to ER. X-rays were obtained in ER which confirmed left hip dislocation. Hip reduction attempted last night under anesthesia per Dr. Ferraro. States she is currently in 8/10 pain. Denies numbness and paresthesia of left lower extremity. Closed hip reduction under anesthesia in OR was successful per Dr. Nicole 3/6 in afternoon. Patient seen and assessed today. States she is feeling much better, denies left hip pain. Left hip abduction brace in appropriate position.      Past Medical History  She has a past medical history of Arthritis, Asthma, Cancer (CMS/HCC), Diabetes mellitus (CMS/HCC), Disease of thyroid gland, Hypertension, Other conditions influencing health status, Personal history of malignant neoplasm, unspecified, Personal history of other diseases of the circulatory system, Personal history of other diseases of the musculoskeletal system and connective tissue, Personal history of other diseases of the respiratory system, Personal history of other endocrine, nutritional and metabolic disease, Personal history of other endocrine, nutritional and metabolic disease, Personal history of other malignant neoplasm of kidney, Personal history of other mental and behavioral disorders, and Personal history of other specified conditions.    She has no past medical history of CHF (congestive heart failure) (CMS/HCC).    Surgical History  She has  "a past surgical history that includes Total hip arthroplasty (05/01/2018); Other surgical history (11/30/2020); Other surgical history (11/30/2020); Other surgical history (11/30/2020); MR angio neck wo IV contrast (9/15/2020); and MR angio head wo IV contrast (9/15/2020).     Social History  She reports that she has never smoked. She has never been exposed to tobacco smoke. She has never used smokeless tobacco. She reports that she does not currently use alcohol. She reports that she does not use drugs.    Family History  No family history on file.     Allergies  Metformin and Sulfa (sulfonamide antibiotics)    Physical Exam  Pleasant. A&Ox3. Left hip normal alignment in abduction brace. No tenderness to left lateral hip and groin.  Sensation is intact to light touch. Left lower extremity is well perfused. Left foot dorsiflexion and plantarflexion intact.      Last Recorded Vitals  Blood pressure 115/87, pulse 96, temperature 36.6 °C (97.9 °F), resp. rate 18, height 1.65 m (5' 4.96\"), weight 114 kg (251 lb 5.2 oz), SpO2 94 %.    Relevant Results  XR ankle left 3+ views    Result Date: 3/7/2024  Interpreted By:  Rk Belle, STUDY: XR ANKLE LEFT 3+ VIEWS; ;  3/7/2024 2:33 pm   INDICATION: Signs/Symptoms:L ankle pain s/p closed hip reduction.   COMPARISON: None.   ACCESSION NUMBER(S): [id number]   ORDERING CLINICIAN: TRUONG EMERSON   FINDINGS: No acute fracture or dislocation of the left ankle is noted. The articular margins have degenerative spurring especially along the plafond. The soft tissues along the malleoli are mildly swollen.   The calcaneus has a prominent plantar spur. The dorsal naviculocuneiform joint has prominent degenerative spurring.       No acute fracture or dislocation is noted.     MACRO: None   Signed by: Rk Belle 3/7/2024 4:04 PM Dictation workstation:   [id number]    FL less than 1 hour    Result Date: 3/6/2024  These images are not reportable by radiology and will not be " interpreted by  Radiologists.    XR hip left with pelvis when performed 2 or 3 views    Result Date: 3/6/2024  Interpreted By:  Leandro Puente, STUDY: XR HIP LEFT WITH PELVIS WHEN PERFORMED 2 OR 3 VIEWS; ;  3/6/2024 12:53 am   INDICATION: Signs/Symptoms:possible dislocation.   COMPARISON: 05/14/2016   ACCESSION NUMBER(S): [id number]   ORDERING CLINICIAN: DOUGIE RUVALCABA   FINDINGS: AP radiograph of the pelvis and two views of the left hip: No acute fracture. Status post bilateral total hip arthroplasty. There is posterosuperior dislocation of the left femoral component relative to the acetabular component. There is diffuse osteopenia. Rounded metallic devices projected over the lower lumbar spine. Is mild sacroiliac arthrosis. Slight soft tissue swelling about the left hip.       1. Left hip arthroplasty dislocation.   Signed by: Leandro Puente 3/6/2024 1:12 AM Dictation workstation:   [id number]    XR foot right 3+ views    Result Date: 2/23/2024  * * *Final Report* * * DATE OF EXAM: Feb 23 2024  8:29AM   BFX   [id number]  -  XR FOOT 3V AP/LAT/OBL RT  / ACCESSION #  [id number] PROCEDURE REASON: Foot pain, right      * * * * Physician Interpretation * * * *  Clinical Information: Pain Three views of the right  foot were obtained. There is no acute fracture or dislocation.  Deformity of the distal aspect fifth metatarsal likely related to remote trauma. Severe narrowing of the first metatarsophalangeal joint.  Degenerative changes second through fifth proximal and distal interphalangeal joints.   Severe degenerative changes tibiotalar joint. Prominent plantar calcaneal spur. No lytic or blastic lesions identified. No soft tissue abnormalities are seen.    IMPRESSION: No acute abnormality of the right foot. Degenerative changes. Remote posttraumatic deformity fifth metatarsal. : [id number]   Transcribe Date/Time: Feb 23 2024 10:47A Dictated by : ASIA GONZALEZ MD This examination was interpreted  and the report reviewed and electronically signed by: ASIA GONZALEZ MD on Feb 23 2024 10:49AM  EST      Scheduled medications  atorvastatin, 40 mg, oral, Daily  azelastine, 2 spray, Each Nostril, BID  budesonide-glycopyr-formoterol, 2 puff, inhalation, BID  buPROPion XL, 300 mg, oral, Daily  carvedilol, 12.5 mg, oral, BID with meals  escitalopram, 20 mg, oral, Daily  fluticasone furoate-vilanteroL, 1 puff, inhalation, Daily  gabapentin, 900 mg, oral, BID  [START ON 3/9/2024] levothyroxine, 175 mcg, oral, Daily  losartan, 50 mg, oral, Daily  montelukast, 10 mg, oral, Nightly  pantoprazole, 40 mg, oral, Daily  rivaroxaban, 20 mg, oral, Daily with evening meal  [START ON 3/10/2024] semaglutide, 2 mg, subcutaneous, q7 days  sennosides, 1 tablet, oral, Nightly  tiotropium, 2 puff, inhalation, Daily  zolpidem, 5 mg, oral, Nightly      Continuous medications     PRN medications  PRN medications: acetaminophen, albuterol, alum-mag hydroxide-simeth, benzonatate, bisacodyl, bisacodyl, melatonin, oxyCODONE, traMADol  Results for orders placed or performed during the hospital encounter of 03/07/24 (from the past 24 hour(s))   POCT GLUCOSE   Result Value Ref Range    POCT Glucose 172 (H) 74 - 99 mg/dL   CBC   Result Value Ref Range    WBC 10.9 4.4 - 11.3 x10*3/uL    nRBC 0.0 0.0 - 0.0 /100 WBCs    RBC 4.16 4.00 - 5.20 x10*6/uL    Hemoglobin 11.4 (L) 12.0 - 16.0 g/dL    Hematocrit 35.6 (L) 36.0 - 46.0 %    MCV 86 80 - 100 fL    MCH 27.4 26.0 - 34.0 pg    MCHC 32.0 32.0 - 36.0 g/dL    RDW 14.6 (H) 11.5 - 14.5 %    Platelets 223 150 - 450 x10*3/uL   Basic Metabolic Panel   Result Value Ref Range    Glucose 150 (H) 74 - 99 mg/dL    Sodium 134 (L) 136 - 145 mmol/L    Potassium 4.2 3.5 - 5.3 mmol/L    Chloride 100 98 - 107 mmol/L    Bicarbonate 28 21 - 32 mmol/L    Anion Gap 10 10 - 20 mmol/L    Urea Nitrogen 14 6 - 23 mg/dL    Creatinine 0.81 0.50 - 1.05 mg/dL    eGFR 76 >60 mL/min/1.73m*2    Calcium 8.6 8.6 - 10.3 mg/dL         Assessment/Plan     Patient doing well status post left hip closed reduction under anesthesia.     Plan as discussed with Dr. Nicole  -PT/OT  -Weightbearing as tolerated  -Left hip abduction brace must remain on at all times, only to remove for hygiene purposes. Brace set at 0-85 degrees.  -Follow-up with Dr. Nicole in 3-4 weeks.     Marcia Talbert, APRN-CNP

## 2024-03-08 NOTE — PROGRESS NOTES
"Occupational Therapy    Evaluation    Patient Name: Maricel Tomlin  MRN: [id number]  Today's Date: 3/8/2024  Time Calculation  Start Time: 0830  Stop Time: 0915  Time Calculation (min): 45 min        Assessment:  Prognosis: Good  End of Session Patient Position: Up in chair, Alarm on  OT Assessment Results: Decreased ADL status, Decreased upper extremity range of motion, Decreased upper extremity strength, Decreased safe judgment during ADL, Decreased endurance, Decreased sensation, Decreased functional mobility, Decreased IADLs  Prognosis: Good  Strengths: Ability to acquire knowledge, Housing layout, Support and attitude of living partners  Plan:  Treatment Interventions: ADL retraining, Functional transfer training, UE strengthening/ROM, Endurance training, Patient/family training, Equipment evaluation/education, Compensatory technique education, Continued evaluation  OT Frequency: 5 times per week (x6/wk prn)  OT Discharge Recommendations:  (mod I with intermittent assist for hip brace/iadl's)  Equipment Recommended upon Discharge: Wheeled walker, Bedside commode (hip kit)  Treatment Interventions: ADL retraining, Functional transfer training, UE strengthening/ROM, Endurance training, Patient/family training, Equipment evaluation/education, Compensatory technique education, Continued evaluation    Subjective   Precautions:  Lt Thr precautions/falls    General:  General  Reason for Referral: OT eval and treat s/p left hip dislocation and closed reduction  Past Medical History Relevant to Rehab: Pt adm 3/6/24 after leaning forward to grab something from floor and felt a \"pop\" in left hip.  Pt had left THR 10 years ago.  Xray revealed left hip dislocation.  Underwent closed reduction in OR 3/6/24. (PMHx:  left/right THR  (~10 yrs ago) with reported dislocation ~1 yr after lt thr, lt tkr, A-fib/flutter, DM, OA, HTN, neuropathy, asthma, lumbago, ELLIE)  Family/Caregiver Present: No       Pain:  Pain Assessment  Pain " "Assessment: 0-10  Pain Score: 6  Pain Location: Hip  Pain Orientation: Left  Pain Interventions: Medication (See MAR), Emotional support    Objective   Cognition:  Overall Cognitive Status: Within Functional Limits  Orientation Level: Oriented X4           Home Living:  Type of Home: House (Bedroom and full bathroom on first floor.  Pt does go to basement almost daily to care for cats; has 2nd floor, does not access)  Lives With: Spouse (Spouse in fair health.  Uses wheeled walker.  Drives)  Home Adaptive Equipment:  (Wheeled walker, rollator walker, cane, sock aid, reacher, long shoe horn, scooter)  Bathroom Shower/Tub: Walk-in shower  Bathroom Toilet: Handicapped height  Bathroom Equipment: Grab bars in shower (shower seat, grab bars)  Prior Function:  Level of [place]: Independent with ADLs and functional transfers  Receives Help From:  (spouse, reports \"we help eachother\")  ADL Assistance: Independent  Homemaking Assistance:  (shares iadl's with , has hired cleaning x2/month)  Ambulatory Assistance: Independent (no device used)  Vocational:  (retired RN)  Leisure: crossword puzzles  Hand Dominance: Right  Prior Function Comments: drives       ADL:  Eating Assistance: Independent  Grooming Assistance: Stand by  Bathing Assistance: Moderate  UE Dressing Assistance: Minimal  LE Dressing Assistance: Maximal (hip brace included)  Toileting Assistance with Device:  (mod/max a)    Bed Mobility/Transfers: Bed Mobility  Bed Mobility:  (min a supine to/from sit)    Transfers  Transfer:  (sit to stand and pivot transfer min a and cues for hand placement, thr precautions maint)      Functional Mobility:  Functional Mobility  Functional Mobility Performed:  (simple mobility min a via wwalker - room distance)        Vision:Vision - Basic Assessment  Current Vision: Wears glasses only for reading  Sensation:  Sensation Comment: neuropathy feet (not new onset)       Extremities: RUE   RUE :  (Limited passive right " shld flexion/ext rot due to pain. Distally, RUE arom WFL. Minimal right shld flex, 2+/5, reports would need sh replacement; Distally, RUE strength is grossly 4-/5.) and LUE   LUE:  (Lt shoulder arom ~80 deg flex; distally wfl arom. Left shld strength 3-/5. Distally 4/5.)      Education Documentation  Handouts, taught by Lala Gonsales OT at 3/8/2024 10:39 AM.  Learner: Patient  Readiness: Acceptance  Method: Explanation  Response: Verbalizes Understanding, Needs Reinforcement    Body Mechanics, taught by Lala Gonsales OT at 3/8/2024 10:39 AM.  Learner: Patient  Readiness: Acceptance  Method: Explanation  Response: Verbalizes Understanding, Needs Reinforcement    Precautions, taught by Lala Gonsales OT at 3/8/2024 10:39 AM.  Learner: Patient  Readiness: Acceptance  Method: Explanation  Response: Verbalizes Understanding, Needs Reinforcement    Home Exercise Program, taught by Lala Gonsales OT at 3/8/2024 10:39 AM.  Learner: Patient  Readiness: Acceptance  Method: Explanation  Response: Verbalizes Understanding, Needs Reinforcement    ADL Training, taught by Lala Gonsales OT at 3/8/2024 10:39 AM.  Learner: Patient  Readiness: Acceptance  Method: Explanation  Response: Verbalizes Understanding, Needs Reinforcement    Education Comments  No comments found.        OP EDUCATION:  Education  Individual(s) Educated: Patient  Education Provided: Diagnosis & Precautions, Risk and benefits of OT discussed with patient or other, POC discussed and agreed upon  Plan of Care Discussed and Agreed Upon: yes  Patient Response to Education: Patient/Caregiver Verbalized Understanding of Information  Education Comment: pt reports no concerns regarding sex/intimacy regarding dx    Goals:  Encounter Problems       Encounter Problems (Active)       Bathing       LTG - Patient will utilize adaptive techniques to bathe body sba (Progressing)       Start:  03/08/24    Expected End:  03/13/24               Dressing Upper Extremities        LTG - Patient will complete upper body dressing mod I (Progressing)       Start:  03/08/24    Expected End:  03/13/24               Dressings Lower Extremities       LTG - Patient will dress lower body/hip brace min a (Progressing)       Start:  03/08/24    Expected End:  03/13/24               Functional Mobility       Completes functional mobility via wwalker mod I (Progressing)       Start:  03/08/24    Expected End:  03/13/24               Grooming       LTG - Patient will complete daily grooming tasks indep (Progressing)       Start:  03/08/24    Expected End:  03/13/24               Instrumental Activities of Daily Living       LTG - Patient will complete simple kitchen access/meal preparation activities supervision via wwalker (Progressing)       Start:  03/08/24    Expected End:  03/13/24               OT Transfers       LTG - Patient will transfer to commode mod I (Progressing)       Start:  03/08/24    Expected End:  03/13/24            LTG - Patient will transfer to walk in shower/dme sba (Progressing)       Start:  03/08/24    Expected End:  03/13/24

## 2024-03-08 NOTE — PROGRESS NOTES
Physical Therapy    Physical Therapy Treatment    Patient Name: Maricel Tomlin  MRN: [id number]  Today's Date: 3/8/2024  Time Calculation  Start Time: 1300  Stop Time: 1345  Time Calculation (min): 45 min       Assessment/Plan         PT Plan  Treatment/Interventions: Bed mobility, Transfer training, Balance training, Stair training, Gait training, Strengthening, Endurance training, Therapeutic exercise, Therapeutic activity  PT Plan: Skilled PT  PT Frequency:  (5x/week, 6x prn)  PT Discharge Recommendations:  (Anticipate discharge home mod independent at the walker level)  Equipment Recommended upon Discharge: Wheeled walker      General Visit Information:   PT  Visit  PT Received On: 03/08/24  General  General Comment: Pt initially asking to try stairs, but after 1st gait trial states she is not feeling up to stair training this session.    Subjective   Precautions:  Precautions  LE Weight Bearing Status: Weight Bearing as Tolerated  Medical Precautions: Fall precautions  Post-Surgical Precautions: Left hip precautions  Braces Applied: Left hip abductor brace at all times  Vital Signs:       Objective   Pain:  Pain Assessment  Pain Assessment: 0-10  Pain Score: 6 (L lateral hip)       Treatments:  Therapeutic Exercise  Therapeutic Exercise Performed:  (Pt performs seated BLE therex 10 x 2 reps: ankle pumps, LAQs, marching (LLE only); in order to improve strength related to transfers, gait, and balance.)    Bed Mobility  Bed Mobility:  (sit > supine with min A)    Ambulation/Gait Training  Ambulation/Gait Training Performed:  (Pt amb 50' x 3 with FWW and min A, antalgic, pt favoring LLE more toward end of gait trial.)  Transfers  Transfer:  (Sit/stand transfers with min A. pivot transfer wc to bed with FWW min A.)    Education Documentation  Pt educated on techniques for transfers and gait training with device in order to maximize safety and independence.  Pt educated on therapeutic exercises, including optimal  techniques to maximize function.       Encounter Problems       Encounter Problems (Active)       PT Problem       LTG - Mod independent bed mobility. (Progressing)       Start:  03/08/24    Expected End:  03/13/24            LTG - Mod independent transfers. (Progressing)       Start:  03/08/24    Expected End:  03/13/24            LTG - Pt amb 100 ft with wheeled walker mod independent. (Progressing)       Start:  03/08/24    Expected End:  03/13/24            LTG - Pt will negotiate 5 stairs with bilateral rails and CGA (Progressing)       Start:  03/08/24    Expected End:  03/13/24            STG - Bed mobility with CGA (Progressing)       Start:  03/08/24    Expected End:  03/11/24            STG - Transfers with CGA (Progressing)       Start:  03/08/24    Expected End:  03/11/24            STG - Pt will amb 50 ft x3 with wheeled walker and CGA (Progressing)       Start:  03/08/24    Expected End:  03/11/24            STG - Pt will negotiate 3 stairs with bilateral rails and min assist. (Progressing)       Start:  03/08/24    Expected End:  03/11/24

## 2024-03-08 NOTE — PROGRESS NOTES
"Physical Therapy    Physical Therapy Evaluation    Patient Name: Maricel Tomlin  MRN: [id number]  Today's Date: 3/8/2024   Time Calculation  Start Time: 0830  Stop Time: 0915  Time Calculation (min): 45 min    Assessment/Plan   PT Assessment  PT Assessment Results: Decreased strength, Decreased endurance, Impaired balance, Decreased mobility, Decreased safety awareness, Pain  Rehab Prognosis:  (Good for stated goals)  Barriers to Discharge: Hip abductor brace  Evaluation/Treatment Tolerance: Patient tolerated treatment well  Strengths: Ability to acquire knowledge, Attitude of self, Housing layout, Premorbid level of function, Support and attitude of living partners  Barriers to Participation:  (Pain)  End of Session Patient Position: Up in chair, Alarm on  IP OR SWING BED PT PLAN  Inpatient or Swing Bed: Inpatient  PT Plan  Treatment/Interventions: Bed mobility, Transfer training, Balance training, Stair training, Gait training, Strengthening, Endurance training, Therapeutic exercise, Therapeutic activity  PT Plan: Skilled PT  PT Frequency:  (5x/week, 6x prn)  PT Discharge Recommendations:  (Anticipate discharge home mod independent at the walker level)  Equipment Recommended upon Discharge: Wheeled walker      Subjective   General Visit Information:  General  Reason for Referral: PT eval and treat s/p left hip dislocation and closed reduction  Referred By: Abner  Past Medical History Relevant to Rehab: Pt adm 3/6/24 after leaning forward to grab something from floor and felt a \"pop\" in left hip.  Pt had left THR 10 years ago.  Xray revealed left hip dislocation.  Underwent closed reduction in OR 3/6/24. (PMHx:  left THR, A-fib/flutter, DM, OA, HTN, neuropathy, asthma, lumbago, ELLIE)  Family/Caregiver Present: No  Patient Position Received: Up in chair, Alarm on  Home Living:  Home Living  Type of Home: House (Bedroom and full bathroom on first floor.  Pt does go to basement almost daily to care for cats.)  Lives " With: Spouse (Spouse in fair health.  Uses wheeled walker.  Drives)  Home Adaptive Equipment:  (Wheeled walker, rollator walker)  Home Access:  (5 stairs to enter with bilateral rails.)  Prior Level of Function:  Prior Function Per Pt/Caregiver Report  Level of [place]:  (Pt independent in the home without a device.  Uses scooter outside the home for distances.)  Receives Help From:  (Help from spouse as needed)  Prior Function Comments:  (Pt does drive)  Precautions:  Precautions  LE Weight Bearing Status: Weight Bearing as Tolerated  Medical Precautions: Fall precautions  Post-Surgical Precautions: Left hip precautions  Braces Applied: Left hip abductor brace at all times     Objective   Pain:  Pain Assessment  Pain Assessment: 0-10  Pain Score: 6  Pain Location: Hip  Pain Orientation: Left  Pain Interventions: Medication (See MAR), Ambulation/increased activity, Distraction, Emotional support, Repositioned  Cognition:  Cognition  Overall Cognitive Status: Within Functional Limits  Orientation Level: Oriented X4    General Assessments:     Sensation  Sensation Comment:  (Pt reporting diminished light touch at bilateral feet due to neuropathy.)    Static Sitting Balance  Static Sitting-Level of Assistance: Independent    Static Standing Balance  Static Standing-Level of Assistance:  (CGA with walker)  Functional Assessments:  Bed Mobility  Bed Mobility:  (Bed mobiltiy with min assist.  Rolling not attempted due to diagnosis)    Transfers  Transfer:  (Sit to stand with min assist.)    Ambulation/Gait Training  Ambulation/Gait Training Performed:  (Pt amb 25 ft with wheeled walker and min/CGA.  Antalgic gait)    Stairs  Stairs:  (Unsafe to attempt at this time.)    Wheelchair Activities  Level of Assistance for Pressure Relief Activities:  (Wheelchair adjustments made to improve comfort and alignment.)  Extremity/Trunk Assessments:  RUE   RUE :  (Limited passive right shld flexion due to pain.  Distally, RUE  PROM WFL.  Pt just initiates right shld flex.  Distally, RUE strength is grossly 4-/5.)  LUE   LUE:  (LUE PROM WFL.  Left shld strength 3-/5.  Distally, 4/5.)  RLE   RLE :  (RLE ROM and strength WFL)  LLE   LLE :  (Left hip not tested.  Distally, LLE ROM WFL and strength grossly 4-/5.)      Encounter Problems       Encounter Problems (Active)       PT Problem       LTG - Mod independent bed mobility. (Progressing)       Start:  03/08/24    Expected End:  03/13/24            LTG - Mod independent transfers. (Progressing)       Start:  03/08/24    Expected End:  03/13/24            LTG - Pt amb 100 ft with wheeled walker mod independent. (Progressing)       Start:  03/08/24    Expected End:  03/13/24            LTG - Pt will negotiate 5 stairs with bilateral rails and CGA (Progressing)       Start:  03/08/24    Expected End:  03/13/24            STG - Bed mobility with CGA (Progressing)       Start:  03/08/24    Expected End:  03/11/24            STG - Transfers with CGA (Progressing)       Start:  03/08/24    Expected End:  03/11/24            STG - Pt will amb 50 ft x3 with wheeled walker and CGA (Progressing)       Start:  03/08/24    Expected End:  03/11/24            STG - Pt will negotiate 3 stairs with bilateral rails and min assist. (Progressing)       Start:  03/08/24    Expected End:  03/11/24                   Education Documentation  Precautions, taught by Maryana Yung, PT at 3/8/2024 10:00 AM.  Learner: Patient  Readiness: Acceptance  Method: Explanation, Demonstration  Response: Verbalizes Understanding, Needs Reinforcement    Mobility Training, taught by Maryana Yung, PT at 3/8/2024 10:00 AM.  Learner: Patient  Readiness: Acceptance  Method: Explanation, Demonstration  Response: Verbalizes Understanding, Needs Reinforcement

## 2024-03-08 NOTE — H&P
Admission diagnosis:    History Of Present Illness  Maricel Tomlin is a 73 y.o. female presenting for rehab after left hip dislocation.  She presented to emergency department for evaluation of left hip pain.  Patient states that she had her left hip replaced many years ago and has had a hip dislocation in the past.  Patient states that this feels very similar.  She states that she was leaning over to grab something and felt a pop in the left hip.  Patient denies being able to bear weight on that extremity.  Patient denies numbness or tingling.  Patient denies fall or traumatic injury.  Patient arrived via EMS to ED for further evaluation.     In ED, an x-ray of the hip was obtained and shows a left hip arthroplasty dislocation per radiology review.  An IV was placed and patient was given moderate sedation in ED and multiple attempts were made at reduction.  This was unsuccessful.  Orthopedics was called and Dr. Ferraro came to ED.  Patient was taken to OR for reduction which was again also unsuccessful.  Patient's original hip arthroplasty was done by Dr. Mina and further care was deferred to him.       A second attempt at closed reduction was successful. Patient evaluated by PT/OT who recommended Acute Rehab. Patient admitted to [facility] re[facility] on 3/7/24.     Patient endorses left hip pain but is in good spirits. We discussed her medications in detail and have corrected for her home medications. She reports that she has had an ongoing pneumonia that is being managed at [facility] for which she was supposed to undergo a chest CT on 3/13.    Admitting Diagnosis:  Left hip Dislocation     Past Medical History  She has a past medical history of Arthritis, Asthma, Cancer (CMS/HCC), Diabetes mellitus (CMS/HCC), Disease of thyroid gland, Hypertension, Other conditions influencing health status, Personal history of malignant neoplasm, unspecified, Personal history of other diseases of the circulatory system, Personal history of other  diseases of the musculoskeletal system and connective tissue, Personal history of other diseases of the respiratory system, Personal history of other endocrine, nutritional and metabolic disease, Personal history of other endocrine, nutritional and metabolic disease, Personal history of other malignant neoplasm of kidney, Personal history of other mental and behavioral disorders, and Personal history of other specified conditions.    She has no past medical history of CHF (congestive heart failure) (CMS/[facility]).    Surgical History  She has a past surgical history that includes Total hip arthroplasty (05/01/2018); Other surgical history (11/30/2020); Other surgical history (11/30/2020); Other surgical history (11/30/2020); MR angio neck wo IV contrast (9/15/2020); and MR angio head wo IV contrast (9/15/2020).    Prior Level of Function  Lives with Spouse, bed/bath 1st floor   Self-Care: independent  Ambulation: independent  Stairs: home access 5 stairs /2 rails   Cognition: alert and oriented x3  Wheelchair: NA  Devices: owns w walker, cane, high toilet, reachers, sock aide, stall shower with seat and grab bars    Current Level of Function   Eating: set up  Oral hygiene: mod a  Toileting: max a x2  Bathing: max a  Upper body dressing: mod a  Lower body dressing: ,ax a  Bed Mobility: max a  Transfers: mod-max a x2  Bladder and Bowel: continent  Cognition: alert and oriented x3    Social History  She reports that she has never smoked. She has never been exposed to tobacco smoke. She has never used smokeless tobacco. She reports that she does not currently use alcohol. She reports that she does not use drugs.     Allergies  Metformin and Sulfa (sulfonamide antibiotics)    Medications  Current Facility-Administered Medications   Medication Dose Route Frequency Provider Last Rate Last Admin    acetaminophen (Tylenol) tablet 975 mg  975 mg oral q6h PRN Divya Dee MD        alum-mag hydroxide-peggy  (Maalox MAX) 400-400-40 mg/5 mL suspension 30 mL  30 mL oral q6h PRN Divya Dee MD        atorvastatin (Lipitor) tablet 40 mg  40 mg oral Daily Divya DAX Dee MD   40 mg at 03/08/24 0753    bisacodyl (Dulcolax) EC tablet 10 mg  10 mg oral Daily PRN Divya Dee MD        bisacodyl (Dulcolax) suppository 10 mg  10 mg rectal Daily PRN Divya Dee MD        buPROPion XL (Wellbutrin XL) 24 hr tablet 300 mg  300 mg oral Daily Divya Dee MD   300 mg at 03/08/24 0753    carvedilol (Coreg) tablet 12.5 mg  12.5 mg oral BID with meals Divya Dee MD        enoxaparin (Lovenox) syringe 40 mg  40 mg subcutaneous q12h MAULIK Divya Dee MD   40 mg at 03/08/24 0753    gabapentin (Neurontin) capsule 900 mg  900 mg oral BID Divya Dee MD   900 mg at 03/08/24 0753    levothyroxine (Synthroid, Levoxyl) tablet 125 mcg  125 mcg oral Daily before breakfast Divya Dee MD   125 mcg at 03/08/24 0608    losartan (Cozaar) tablet 50 mg  50 mg oral Daily Divya Dee MD        melatonin tablet 3 mg  3 mg oral Nightly PRN Divya Dee MD        oxyCODONE (Roxicodone) immediate release tablet 5 mg  5 mg oral q6h PRN Divya Dee MD   5 mg at 03/08/24 0316    sennosides (Senokot) tablet 8.6 mg  1 tablet oral Nightly Divya DAX Dee MD   8.6 mg at 03/07/24 2319    tiotropium (Spiriva Respimat) 2.5 mcg/actuation inhaler 2 puff  2 puff inhalation Daily Divya Dee MD   2 puff at 03/08/24 0656    traMADol (Ultram) tablet 50 mg  50 mg oral q6h PRN Divya Dee MD        zolpidem (Ambien) tablet 5 mg  5 mg oral Nightly Divya Dee MD   5 mg at 03/07/24 2320        Labs  Admission on 03/07/2024   Component Date Value Ref Range Status    POCT Glucose 03/07/2024 172  (H)  74 - 99 mg/dL Final    WBC 03/08/2024 10.9  4.4 - 11.3 x10*3/uL Final    nRBC 03/08/2024 0.0  0.0 - 0.0 /100 WBCs Final    RBC 03/08/2024 4.16  4.00 - 5.20 x10*6/uL Final    Hemoglobin 03/08/2024 11.4 (L)  12.0 - 16.0 g/dL Final    Hematocrit 03/08/2024 35.6 (L)  36.0 - 46.0 % Final    MCV 03/08/2024 86  80 - 100 fL Final    MCH 03/08/2024 27.4  26.0 - 34.0 pg Final    MCHC 03/08/2024 32.0  32.0 - 36.0 g/dL Final    RDW 03/08/2024 14.6 (H)  11.5 - 14.5 % Final    Platelets 03/08/2024 223  150 - 450 x10*3/uL Final    Glucose 03/08/2024 150 (H)  74 - 99 mg/dL Final    Sodium 03/08/2024 134 (L)  136 - 145 mmol/L Final    Potassium 03/08/2024 4.2  3.5 - 5.3 mmol/L Final    Chloride 03/08/2024 100  98 - 107 mmol/L Final    Bicarbonate 03/08/2024 28  21 - 32 mmol/L Final    Anion Gap 03/08/2024 10  10 - 20 mmol/L Final    Urea Nitrogen 03/08/2024 14  6 - 23 mg/dL Final    Creatinine 03/08/2024 0.81  0.50 - 1.05 mg/dL Final    eGFR 03/08/2024 76  >60 mL/min/1.73m*2 Final    Calculations of estimated GFR are performed using the 2021 CKD-EPI Study Refit equation without the race variable for the IDMS-Traceable creatinine methods.  https://jasn.asnjournals.org/content/early/2021/09/22/ASN.2245094394    Calcium 03/08/2024 8.6  8.6 - 10.3 mg/dL Final   Admission on 03/06/2024, Discharged on 03/07/2024   Component Date Value Ref Range Status    POCT Glucose 03/06/2024 159 (H)  74 - 99 mg/dL Final    WBC 03/06/2024 16.3 (H)  4.4 - 11.3 x10*3/uL Final    nRBC 03/06/2024 0.0  0.0 - 0.0 /100 WBCs Final    RBC 03/06/2024 4.80  4.00 - 5.20 x10*6/uL Final    Hemoglobin 03/06/2024 13.0  12.0 - 16.0 g/dL Final    Hematocrit 03/06/2024 42.3  36.0 - 46.0 % Final    MCV 03/06/2024 88  80 - 100 fL Final    MCH 03/06/2024 27.1  26.0 - 34.0 pg Final    MCHC 03/06/2024 30.7 (L)  32.0 - 36.0 g/dL Final    RDW 03/06/2024 14.8 (H)  11.5 - 14.5 % Final    Platelets 03/06/2024 289  150 - 450 x10*3/uL Final    Glucose 03/06/2024 126 (H)  74 - 99  mg/dL Final    Sodium 03/06/2024 138  136 - 145 mmol/L Final    Potassium 03/06/2024 4.3  3.5 - 5.3 mmol/L Final    Chloride 03/06/2024 106  98 - 107 mmol/L Final    Bicarbonate 03/06/2024 27  21 - 32 mmol/L Final    Anion Gap 03/06/2024 9 (L)  10 - 20 mmol/L Final    Urea Nitrogen 03/06/2024 22  6 - 23 mg/dL Final    Creatinine 03/06/2024 0.88  0.50 - 1.05 mg/dL Final    eGFR 03/06/2024 69  >60 mL/min/1.73m*2 Final    Calculations of estimated GFR are performed using the 2021 CKD-EPI Study Refit equation without the race variable for the IDMS-Traceable creatinine methods.  https://jasn.asnjournals.org/content/early/2021/09/22/ASN.4577406124    Calcium 03/06/2024 8.8  8.6 - 10.3 mg/dL Final    Albumin 03/06/2024 3.5  3.4 - 5.0 g/dL Final    Alkaline Phosphatase 03/06/2024 129  33 - 136 U/L Final    Total Protein 03/06/2024 5.8 (L)  6.4 - 8.2 g/dL Final    AST 03/06/2024 8 (L)  9 - 39 U/L Final    Bilirubin, Total 03/06/2024 0.9  0.0 - 1.2 mg/dL Final    ALT 03/06/2024 10  7 - 45 U/L Final    Patients treated with Sulfasalazine may generate falsely decreased results for ALT.    ABO TYPE 03/06/2024 O   Final    Rh TYPE 03/06/2024 POS   Final    ANTIBODY SCREEN 03/06/2024 NEG   Final    Protime 03/06/2024 14.9 (H)  9.8 - 12.8 seconds Final    INR 03/06/2024 1.3 (H)  0.9 - 1.1 Final    Color, Urine 03/07/2024 Yellow  Straw, Yellow Final    Appearance, Urine 03/07/2024 Clear  Clear Final    Specific Gravity, Urine 03/07/2024 1.016  1.005 - 1.035 Final    pH, Urine 03/07/2024 5.0  5.0, 5.5, 6.0, 6.5, 7.0, 7.5, 8.0 Final    Protein, Urine 03/07/2024 NEGATIVE  NEGATIVE mg/dL Final    Glucose, Urine 03/07/2024 NEGATIVE  NEGATIVE mg/dL Final    Blood, Urine 03/07/2024 NEGATIVE  NEGATIVE Final    Ketones, Urine 03/07/2024 NEGATIVE  NEGATIVE mg/dL Final    Bilirubin, Urine 03/07/2024 NEGATIVE  NEGATIVE Final    Urobilinogen, Urine 03/07/2024 <2.0  <2.0 mg/dL Final    Nitrite, Urine 03/07/2024 POSITIVE (A)  NEGATIVE Final     "Leukocyte Esterase, Urine 03/07/2024 NEGATIVE  NEGATIVE Final    aPTT 03/06/2024 38  27 - 38 seconds Final    POCT Glucose 03/06/2024 116 (H)  74 - 99 mg/dL Final    WBC 03/07/2024 13.1 (H)  4.4 - 11.3 x10*3/uL Final    nRBC 03/07/2024 0.0  0.0 - 0.0 /100 WBCs Final    RBC 03/07/2024 4.49  4.00 - 5.20 x10*6/uL Final    Hemoglobin 03/07/2024 12.0  12.0 - 16.0 g/dL Final    Hematocrit 03/07/2024 38.7  36.0 - 46.0 % Final    MCV 03/07/2024 86  80 - 100 fL Final    MCH 03/07/2024 26.7  26.0 - 34.0 pg Final    MCHC 03/07/2024 31.0 (L)  32.0 - 36.0 g/dL Final    RDW 03/07/2024 14.8 (H)  11.5 - 14.5 % Final    Platelets 03/07/2024 261  150 - 450 x10*3/uL Final    Glucose 03/07/2024 141 (H)  74 - 99 mg/dL Final    Sodium 03/07/2024 136  136 - 145 mmol/L Final    Potassium 03/07/2024 4.5  3.5 - 5.3 mmol/L Final    Chloride 03/07/2024 102  98 - 107 mmol/L Final    Bicarbonate 03/07/2024 29  21 - 32 mmol/L Final    Anion Gap 03/07/2024 10  10 - 20 mmol/L Final    Urea Nitrogen 03/07/2024 11  6 - 23 mg/dL Final    Creatinine 03/07/2024 0.76  0.50 - 1.05 mg/dL Final    eGFR 03/07/2024 82  >60 mL/min/1.73m*2 Final    Calculations of estimated GFR are performed using the 2021 CKD-EPI Study Refit equation without the race variable for the IDMS-Traceable creatinine methods.  https://jasn.asnjournals.org/content/early/2021/09/22/ASN.0652144448    Calcium 03/07/2024 8.5 (L)  8.6 - 10.3 mg/dL Final    WBC, Urine 03/07/2024 1-5  1-5, NONE /HPF Final    RBC, Urine 03/07/2024 1-2  NONE, 1-2, 3-5 /HPF Final    Bacteria, Urine 03/07/2024 2+ (A)  NONE SEEN /HPF Final    Mucus, Urine 03/07/2024 1+  Reference range not established. /LPF Final        Last Recorded Vitals  Blood pressure 115/87, pulse 96, temperature 36.6 °C (97.9 °F), resp. rate 18, height 1.65 m (5' 4.96\"), weight 114 kg (251 lb 5.2 oz), SpO2 94 %.    Review of Systems   Constitutional:  Negative for chills and fatigue.   HENT:  Negative for sore throat.    Respiratory:  " Positive for cough and wheezing. Negative for shortness of breath.    Cardiovascular:  Negative for chest pain.   Gastrointestinal:  Negative for abdominal pain, diarrhea and nausea.   Genitourinary:  Positive for difficulty urinating.   Musculoskeletal:  Positive for back pain.   Neurological:  Positive for dizziness and headaches.        Physical Exam:    General: Resting comfortably, no acute distress. Pleasant, cooperative.    Respiratory: Equal and symmetrical chest rise bilaterally. No respiratory distress. Diffuse rhonchi in all left fields, clear on right.  Cardiovascular: 2+ radial pulses bilaterally. Warm and well-perfused extremities. RRR.  Abdomen: Soft, non-tender, non-distended.    Skin: No obvious or apparent rashes on exposed skin.    Neuro: Alert and oriented to person, place, and time as evidenced by appropriate conversation. No word-finding difficulties.    MSK: No obvious deformities.  Strength 5/5 for upper and lower extremities, left hip flexion not tested due to brace  Psych: Appropriate mood and affect.        Assessment/Plan   Principal Problem:    Hip dislocation, left (CMS/HCC)  Active Problems:    HTN (hypertension)    Hyperlipidemia    Atrial fibrillation (CMS/HCC)    Diabetes mellitus, type 2 (CMS/HCC)    Hypothyroidism    Asthma    Chronic obstructive pulmonary disease (CMS/HCC)    Dislocation of left hip, subsequent encounter    Dislocation of left hip, initial encounter (CMS/HCC)    Hip dislocation, left, sequela      #Impaired mobility and Impaired independence with ADLs and I/ADLs 2/2 left hip dislocation  - PT/OT 5-7 days per week 3 hours per day to maximize safety and independence with functional mobility and self-care  - Start PT, working on bed mobility, transfers, balance, endurance, strength, gait, eval for appropriate assistive device  - Start OT, working on functional cognition, functional mobility, upper limb strength/coordination, balance, endurance, eval for appropriate  adaptive equipment, ADLs, and I/ADLs.  - Goal Mod I for mobility/transfers and basic ADLs    #Left hip dislocation  -difficult to reduce, required 2 OR attempts under general. FU Dr. Nicole in 3 weeks.  -Pain control with tylenol 975mg q6hprn, oxycodone 5mg q6hprn, tramadol 50mg q6hprn    #CV: A-fib/a flutter, , hyperlipidemia, hypertension,   - cont home atorvastatin, carvedilol, losartan, xarelto 20mg    #DM  - cont home ozempic that patient family is bringing in  - patient reports not taking anything else for DM, consider SSI if warranted.    #asthma  -cont home Breztri (patient family bringing in), albuterol prn, singulair, spiriva    #Hx of OA and chronic LBP  - cont gabapentin 900mg BID  - norco at home, oxycodone as above    #Hypothyroidism  -Patient reports taking 175mcg daily except none on Sunday and 0.5 tabs on Monday    #Insomnia  -cont home zolpidem 5mg qhs       Bowel/Bladder  -facilitate daily active BM   -continence of bladder per timed void schedule and rehab nursing  -check PVRs and treat appropriately    DVT prophylaxis  -SCDs and ambulation  -Chemoprophylaxis with home xarelto for DVT    FEN  -follow BMP and treat appropriately  -monitor oral intake daily    Physician Physical Assessment/ Post admission Evaluation ([facility])     I have reviewed the preadmission rehabilitation screening. There have been no relevant changes since the preadmission screening;    Rehab Plan of Care   The Interdisciplinary Team will work collaboratively to address the patient problems and goals to be managed by the Individualized Interdisciplinary Plan of Care. See the IPOC note for a complete review of the plan of care.    Medical Necessity:  Maricel Tomlin requires, and is capable of participating in, an intensive and coordinated interdisciplinary acute inpatient rehabilitation program. She requires close rehabilitation physician monitoring and management to monitor her complex medical conditions and coordinate  rehabilitation care. The patient's rehabilitation goals and medical complexity cannot adequately be managed in a less intensive setting. Potential risks for clinical complications include: DVT, falls, pain.    Medical Prognosis:  Good for continued progress and participation with therapy.    The estimated length of stay: 7 days      Total time spent with patient 65 minutes with discussion with patient, rehab team, review of history, physical exam and documentation along with the development of the plan of care.    Plan of care developed today after review of team notes.  The plan of care was reviewed with the patient.      Jose Jiménez, DO

## 2024-03-08 NOTE — CONSULTS
"Nutrition Initial Assessment:     Reason for Assessment  Reason for Assessment: Admission nursing screening (Acute rehab protocol)    Visit Reason: Pt presented w/ left hip pain, scans determined hip dislocation, closed reduction of left hip dislocation or prosthesis on 3/6  Recommendation(s):  Recommend 60g Carb control diet (1600-1800kcals)  Reweigh at least every 5 days        Nutrition Assessment    Nutrition History  Energy Intake: Fair 50-75 %  Food and Nutrient History: Intakes per flowsheet: 3/6- 100% at dinner, 3/8- 100% at breakfast. Pt sitting up in bed eating lunch at time of visit. She reports her appetite is good with no recent changes, denies GI issues. Pt has been on Ozempic for 1 year and reports a gradual decline in the amount of food she is able to eat. She reports eating 2 meals a day. Food recall includes cereal or toast for breakfast, and soup, hamburgers, pizza, steak and potatos or fast food for dinner. She mention seeing a Registered Dietitian in the past regarding diabetic diet counseling. She declines any diet education during this admission saying \"she is too old to change her ways now\".  Vitamin/Herbal Supplement Use: Vitamin D, multivitamin, and Zinc at home      Difficulty chewing: denies  Difficulty swallowing: denies    Per Flowsheet Percent Meal intake: 100  Dietary Orders (From admission, onward)       Start     Ordered    03/08/24 1343  Adult diet Carb Controlled; 60 gram carb/meal, 30 gram Carb evening snack  Diet effective now        Question Answer Comment   Diet type Carb Controlled    Carb diet selection: 60 gram carb/meal, 30 gram Carb evening snack        03/08/24 1343                     Results from last 7 days   Lab Units 03/08/24  0603 03/07/24  0602 03/06/24  0517   GLUCOSE mg/dL 150* 141* 126*   SODIUM mmol/L 134* 136 138   POTASSIUM mmol/L 4.2 4.5 4.3   CHLORIDE mmol/L 100 102 106   CO2 mmol/L 28 29 27   BUN mg/dL 14 11 22   CREATININE mg/dL 0.81 0.76 0.88   EGFR " "mL/min/1.73m*2 76 82 69   CALCIUM mg/dL 8.6 8.5* 8.8     Lab Results   Component Value Date    HGBA1C 7.2 (H) 02/23/2024    HGBA1C 6.6 (H) 02/15/2023    HGBA1C 8.7 (H) 07/25/2022     Results from last 7 days   Lab Units 03/07/24  1914 03/06/24  1136 03/06/24  0339   POCT GLUCOSE mg/dL 172* 116* 159*     GI per flowsheet:  Gastrointestinal  Gastrointestinal (WDL): Within Defined Limits  Abdomen Inspection: Rounded  Abdominal Tenderness: Soft  Bowel Sounds: All quadrants  Bowel Sounds (All Quadrants): Active  Last BM Date: 03/05/24  Last bowel movement documented: 03/05/24  PMH: Afib, diabetes, HLD, OA, HTN, ELLIE, asthma, Lumbago   Allergies: Metformin and Sulfa (sulfonamide antibiotics)     Anthropometrics:  Height: 165 cm (5' 4.96\")  Weight: 114 kg (251 lb 5.2 oz)  BMI (Calculated): 41.87  IBW: 56 kg  %IBW:        Weight History / % Weight Change: 1/2/23- 116kg, 8/10-23- 117kg, 12/20/23- 116kg, 3/6/24- 113kg, 3/7/24- 114kg. Pt reports a 56lbs weight loss since she started Ozempic injections a year ago. Weight records do not confirm this.       Significant Weight Loss: No     Estimated Nutritional Needs:  Total Energy Estimated Needs (kCal): 1647 kCal  Method for Estimating Needs: [facility]    Method for Estimating Needs: 84-112g (1.5- 2/g/kg IBW)    Method for Estimating Needs: 2 L/day or per MD    Nutrition Focused Physical Findings:   Orbital Fat Pads: Well nourshed (slightly bulging fat pads)  Buccal Fat Pads: Well nourished (full, rounded cheeks)  Triceps: Well nourished (ample fat tissue)    Temporalis: Well nourished (well-defined muscle)  Pectoralis (Clavicular Region): Well nourished (clavicle not visible)  Deltoid/Trapezius: Well nourished (rounded appearance at arm, shoulder, neck)  Interosseous: Well nourished (muscle bulges)  Trapezius/Infraspinatus/Supraspinatus (Scapular Region): Well nourished (bones not prominent, muscle taut)    Edema  Edema Location: Generalized BLE    Skin: Negative (no pressure ulcers " indicated)  Pain Score: 6     Nutrition Diagnosis        Patient has Nutrition Diagnosis: Yes  New  Nutrition Diagnosis 1: Limited adherence to nutrition related recommendations  Related to (1): no ready for diet/lifestyle change  As Evidenced by (1): No motivation to adhere to diet recommendations and declined additional diet education this admission  Additional Assessment Information (1): BMI= 41, h/o of diabetes    Nutrition Interventions/Recommendations   Interventions  Collaboration and Referral of Nutrition Care: Other (Comment) (spoke w/ pt at bedside)  Interventions: Meals and snacks  Meals and Snacks: Carbohydrate-modified diet  Goal: Pt to eat 3 meals a day  Individualized Nutrition Prescription Provided for : 1647kcals, 112g protein, 2L fluids per day to come from 60g Carb control diet    Nutrition Monitoring and Evaluation   Body Composition/Growth/Weight History  Monitoring and Evaluation Plan: Weight  Weight: Measured weight  Criteria: reweigh at least every 5 days  Biochemical Data, Medical Tests and Procedures  Monitoring and Evaluation Plan: Glucose/endocrine profile  Criteria: Labs WNL  Food/Nutrient Related History Monitoring  Monitoring and Evaluation Plan: Energy intake, Fluid intake, Amount of food  Energy Intake: Estimated energy intake  Criteria: Meal/ONS intake to meet 100% of estimated needs  Fluid Intake: Estimated fluid intake  Criteria: fluid intake to meet 100% of estimated need  Amount of Food: Estimated amout of food  Criteria: Pt to consume 100% of meals    Progress towards goals: Will assess progress towards goals at follow up.     Education Documentation  No documentation found.    No diet education provided at this time     Time Spent (min): 45 minutes  Last Date of Nutrition Visit: 03/08/24  Nutrition Follow-Up Needed?: 5-7 days  Follow up Comment: 3/15 EC

## 2024-03-08 NOTE — INDIVIDUALIZED OVERALL PLAN OF CARE NOTE
Physical Medicine and Rehabilitation  Individualized Overall Plan of Care    Patient Name: Maricel Tomlin  Medical Record Number: [id number]  YOB: 1950  Sex: Female  Payor Info: Payor: MEDICARE / Plan: MEDICARE PART A AND B / Product Type: *No Product type* /     Primary Rehab (Etiologic) Diagnosis: Hip dislocation, left (CMS/HCC)    Admit Date/Time: 3/7/2024  7:04 PM    Estimated Length of Stay: 7 days  Anticipated Discharge Destination:home  Anticipated Level of Function: modified independent    The following plan of care has been synthesized from the Preadmission Screening/consults, the Post-Admission Physician Evaluation and the individual therapy assessments.     Medical Prognosis:    The patient's medical prognosis is good to achieve the stated goals below.    Anticipated Interventions:    The patient will undergo inpatient rehabilitation to manage complex medical and rehabilitation needs related to Hip dislocation, left (CMS/HCC).    The patient will receive interdisciplinary care, including daily rehabilitation physician management for the following: Pain    The patient will benefit from continued services by: PT for 90 minutes per day, 5 days per week  and OT for 90 minutes per day, 5 days per week     Expected Functional Outcomes:  Mobility: modified independent  Self Care: modified independent  Cognition: independent  Communication: independent    Patient goals were discussed and agreed upon with the patient.  I have reviewed the therapy notes and agree with the current plan of care.    Assessment/Plan   Principal Problem:    Hip dislocation, left (CMS/HCC)  Active Problems:    HTN (hypertension)    Hyperlipidemia    Atrial fibrillation (CMS/HCC)    Diabetes mellitus, type 2 (CMS/HCC)    Hypothyroidism    Asthma    Chronic obstructive pulmonary disease (CMS/HCC)    Dislocation of left hip, subsequent encounter    Dislocation of left hip, initial encounter (CMS/HCC)    Hip dislocation, left,  sequela      #Impaired mobility and Impaired independence with ADLs and I/ADLs 2/2 left hip dislocation  - PT/OT 5-7 days per week 3 hours per day to maximize safety and independence with functional mobility and self-care  - Start PT, working on bed mobility, transfers, balance, endurance, strength, gait, eval for appropriate assistive device  - Start OT, working on functional cognition, functional mobility, upper limb strength/coordination, balance, endurance, eval for appropriate adaptive equipment, ADLs, and I/ADLs.  - Goal Mod I for mobility/transfers and basic ADLs    #Left hip dislocation  -difficult to reduce, required 2 OR attempts under general.  -Pain control with tylenol 975mg q6hprn, oxycodone 5mg q6hprn, tramadol 50mg q6hprn    #CV: A-fib/a flutter, , hyperlipidemia, hypertension,   - cont home atorvastatin, carvedilol, losartan, xarelto 20mg    #DM  - cont home ozempic that patient family is bringing in  - patient reports not taking anything else for DM, consider SSI if warranted.    #asthma  -cont home Breztri (patient family bringing in), albuterol prn, singulair, spiriva    #Hx of OA and chronic LBP  - cont gabapentin 900mg BID  - norco at home, oxycodone as above    #Hypothyroidism  -Patient reports taking 175mcg daily except none on Sunday and 0.5 tabs on Monday    #Insomnia  -cont home zolpidem 5mg qhs       Bowel/Bladder  -facilitate daily active BM   -continence of bladder per timed void schedule and rehab nursing  -check PVRs and treat appropriately    DVT prophylaxis  -SCDs and ambulation  -Chemoprophylaxis with home xarelto for DVT    FEN  -follow BMP and treat appropriately  -monitor oral intake daily       Jose Jiménez DO  3/8/2024 10:30 AM

## 2024-03-09 PROCEDURE — 97110 THERAPEUTIC EXERCISES: CPT | Mod: GO

## 2024-03-09 PROCEDURE — 97530 THERAPEUTIC ACTIVITIES: CPT | Mod: GP,CQ

## 2024-03-09 PROCEDURE — 1180000001 HC REHAB PRIVATE ROOM DAILY

## 2024-03-09 PROCEDURE — 97530 THERAPEUTIC ACTIVITIES: CPT | Mod: GO

## 2024-03-09 PROCEDURE — 2500000002 HC RX 250 W HCPCS SELF ADMINISTERED DRUGS (ALT 637 FOR MEDICARE OP, ALT 636 FOR OP/ED): Mod: MUE | Performed by: PHYSICAL MEDICINE & REHABILITATION

## 2024-03-09 PROCEDURE — 2500000002 HC RX 250 W HCPCS SELF ADMINISTERED DRUGS (ALT 637 FOR MEDICARE OP, ALT 636 FOR OP/ED): Mod: MUE

## 2024-03-09 PROCEDURE — 2500000002 HC RX 250 W HCPCS SELF ADMINISTERED DRUGS (ALT 637 FOR MEDICARE OP, ALT 636 FOR OP/ED): Performed by: PHYSICAL MEDICINE & REHABILITATION

## 2024-03-09 PROCEDURE — 2500000001 HC RX 250 WO HCPCS SELF ADMINISTERED DRUGS (ALT 637 FOR MEDICARE OP): Performed by: PHYSICAL MEDICINE & REHABILITATION

## 2024-03-09 PROCEDURE — 97535 SELF CARE MNGMENT TRAINING: CPT | Mod: GO

## 2024-03-09 PROCEDURE — 97116 GAIT TRAINING THERAPY: CPT | Mod: GP,CQ

## 2024-03-09 PROCEDURE — 2500000002 HC RX 250 W HCPCS SELF ADMINISTERED DRUGS (ALT 637 FOR MEDICARE OP, ALT 636 FOR OP/ED)

## 2024-03-09 PROCEDURE — 97110 THERAPEUTIC EXERCISES: CPT | Mod: GP,CQ

## 2024-03-09 PROCEDURE — 94640 AIRWAY INHALATION TREATMENT: CPT

## 2024-03-09 PROCEDURE — 2500000001 HC RX 250 WO HCPCS SELF ADMINISTERED DRUGS (ALT 637 FOR MEDICARE OP)

## 2024-03-09 RX ADMIN — AZELASTINE HYDROCHLORIDE 2 SPRAY: 137 SPRAY, METERED NASAL at 22:14

## 2024-03-09 RX ADMIN — ATORVASTATIN CALCIUM 40 MG: 40 TABLET, FILM COATED ORAL at 08:00

## 2024-03-09 RX ADMIN — CARVEDILOL 12.5 MG: 12.5 TABLET, FILM COATED ORAL at 17:03

## 2024-03-09 RX ADMIN — GABAPENTIN 900 MG: 300 CAPSULE ORAL at 21:58

## 2024-03-09 RX ADMIN — MONTELUKAST 10 MG: 10 TABLET, FILM COATED ORAL at 21:59

## 2024-03-09 RX ADMIN — TIOTROPIUM BROMIDE INHALATION SPRAY 2 PUFF: 3.12 SPRAY, METERED RESPIRATORY (INHALATION) at 06:18

## 2024-03-09 RX ADMIN — LOSARTAN POTASSIUM 50 MG: 50 TABLET, FILM COATED ORAL at 08:00

## 2024-03-09 RX ADMIN — GABAPENTIN 900 MG: 300 CAPSULE ORAL at 08:00

## 2024-03-09 RX ADMIN — FLUTICASONE FUROATE AND VILANTEROL TRIFENATATE 1 PUFF: 100; 25 POWDER RESPIRATORY (INHALATION) at 06:20

## 2024-03-09 RX ADMIN — CARVEDILOL 12.5 MG: 12.5 TABLET, FILM COATED ORAL at 08:01

## 2024-03-09 RX ADMIN — ZOLPIDEM TARTRATE 5 MG: 5 TABLET, COATED ORAL at 21:59

## 2024-03-09 RX ADMIN — PANTOPRAZOLE SODIUM 40 MG: 40 TABLET, DELAYED RELEASE ORAL at 06:35

## 2024-03-09 RX ADMIN — OXYCODONE HYDROCHLORIDE 5 MG: 5 TABLET ORAL at 08:00

## 2024-03-09 RX ADMIN — ESCITALOPRAM OXALATE 20 MG: 10 TABLET ORAL at 08:00

## 2024-03-09 RX ADMIN — LEVOTHYROXINE SODIUM 175 MCG: 150 TABLET ORAL at 06:35

## 2024-03-09 RX ADMIN — AZELASTINE HYDROCHLORIDE 2 SPRAY: 137 SPRAY, METERED NASAL at 08:01

## 2024-03-09 RX ADMIN — RIVAROXABAN 20 MG: 20 TABLET, FILM COATED ORAL at 17:03

## 2024-03-09 RX ADMIN — BUPROPION HYDROCHLORIDE 300 MG: 150 TABLET, FILM COATED, EXTENDED RELEASE ORAL at 08:00

## 2024-03-09 RX ADMIN — SENNOSIDES 8.6 MG: 8.6 TABLET, FILM COATED ORAL at 21:58

## 2024-03-09 RX ADMIN — OXYCODONE HYDROCHLORIDE 5 MG: 5 TABLET ORAL at 22:02

## 2024-03-09 ASSESSMENT — PAIN SCALES - GENERAL
PAINLEVEL_OUTOF10: 8
PAINLEVEL_OUTOF10: 5 - MODERATE PAIN
PAINLEVEL_OUTOF10: 8
PAINLEVEL_OUTOF10: 7
PAINLEVEL_OUTOF10: 0 - NO PAIN
PAINLEVEL_OUTOF10: 7

## 2024-03-09 ASSESSMENT — PAIN DESCRIPTION - LOCATION: LOCATION: HIP

## 2024-03-09 ASSESSMENT — PAIN DESCRIPTION - ORIENTATION: ORIENTATION: LEFT

## 2024-03-09 ASSESSMENT — PAIN - FUNCTIONAL ASSESSMENT
PAIN_FUNCTIONAL_ASSESSMENT: 0-10

## 2024-03-09 ASSESSMENT — ACTIVITIES OF DAILY LIVING (ADL): HOME_MANAGEMENT_TIME_ENTRY: 15

## 2024-03-09 NOTE — PROGRESS NOTES
Physical Therapy    Physical Therapy Treatment    Patient Name: Maricel Tomlin  MRN: [id number]  Today's Date: 3/9/2024  Time Calculation  Start Time: 0830  Stop Time: 0915  Time Calculation (min): 45 min       Assessment/Plan         PT Plan  Treatment/Interventions: Bed mobility, Transfer training, Balance training, Stair training, Gait training, Strengthening, Endurance training, Therapeutic exercise, Therapeutic activity  PT Plan: Skilled PT  PT Frequency:  (5x/week, 6x prn)  PT Discharge Recommendations:  (Anticipate discharge home mod independent at the walker level)  Equipment Recommended upon Discharge: Wheeled walker      General Visit Information:   PT  Visit  PT Received On: 03/09/24  General  Patient Position Received: Up in chair, Alarm on    Subjective   Pt requesting to only do exercise this session d/t increased pain and fatigue.    Precautions:  Precautions  Medical Precautions: Fall precautions  Braces Applied: Left hip abductor brace on at all times       Objective   Pain:  Pain Assessment  Pain Score:  (8/10 left foot and ankle, 5/10 left hip)  Pain Interventions: Medication (See MAR)     Treatments:  Therapeutic Exercise  Therapeutic Exercise Performed: Yes  Therapeutic Exercise Activity 1: pt performed seated exercises consisting of AP, GS, LAQ, marches, ADD and foot slides x2-3 sets of 10-20 reps to facilitate improvement in functional mobility.      Education Documentation  Patient educated in therapeutic exercise, including optimal techniques to maximize benefit and function.        Encounter Problems       Encounter Problems (Active)       PT Problem       LTG - Mod independent bed mobility. (Progressing)       Start:  03/08/24    Expected End:  03/13/24            LTG - Mod independent transfers. (Progressing)       Start:  03/08/24    Expected End:  03/13/24            LTG - Pt amb 100 ft with wheeled walker mod independent. (Progressing)       Start:  03/08/24    Expected End:  03/13/24             LTG - Pt will negotiate 5 stairs with bilateral rails and CGA (Progressing)       Start:  03/08/24    Expected End:  03/13/24            STG - Bed mobility with CGA (Progressing)       Start:  03/08/24    Expected End:  03/11/24            STG - Transfers with CGA (Progressing)       Start:  03/08/24    Expected End:  03/11/24            STG - Pt will amb 50 ft x3 with wheeled walker and CGA (Progressing)       Start:  03/08/24    Expected End:  03/11/24            STG - Pt will negotiate 3 stairs with bilateral rails and min assist. (Progressing)       Start:  03/08/24    Expected End:  03/11/24

## 2024-03-09 NOTE — PROGRESS NOTES
Occupational Therapy    OT Treatment    Patient Name: Maricel Tomlin  MRN: [id number]  Today's Date: 3/9/2024  Time Calculation  Start Time: 0915  Stop Time: 1000  Time Calculation (min): 45 min         Assessment:  End of Session Patient Position: Up in chair, Alarm on     Plan:  Treatment Interventions: ADL retraining, Functional transfer training, UE strengthening/ROM, Endurance training, Patient/family training, Equipment evaluation/education, Compensatory technique education, Continued evaluation  OT Frequency: 5 times per week (x6/wk prn)  OT Discharge Recommendations:  (mod I with intermittent assist for hip brace/iadl's)  Equipment Recommended upon Discharge: Wheeled walker, Bedside commode (hip kit)  Treatment Interventions: ADL retraining, Functional transfer training, UE strengthening/ROM, Endurance training, Patient/family training, Equipment evaluation/education, Compensatory technique education, Continued evaluation    Subjective   Previous Visit Info:  OT Last Visit  OT Received On: 03/09/24    General:  General  Prior to Session Communication: Bedside nurse  Patient Position Received: Up in chair, Alarm on    Precautions:  Medical Precautions: Fall precautions  Post-Surgical Precautions: Left hip precautions  Braces Applied: Left hip abductor brace on at all times       Pain:  Pain Assessment  Pain Assessment: 0-10  Pain Score: 7  Pain Location:  (bilateral ankles 7/10; left hip 4/10)  Pain Interventions: Medication (See MAR), Distraction, Emotional support    Objective         Bed Mobility/Transfers: Transfers  Transfer:  (cga transfers to/from wheelchair via wheeled walker)      Functional Mobility:  Functional Mobility  Functional Mobility Performed:  (cga simple functional mobility, including maneuvering around environmental barriers and item retrieval using reacher, via wheeled walker; occasional cues for walker safety)       Therapy/Activity: Therapeutic Exercise  Therapeutic Exercise Performed:  Yes  Patient completes bilateral UE therapeutic exercise program using one pound weights, 10 reps x 3 sets x 3 exercises, with supervision to promote improved independence with ADL's and transfers. Shoulder exercises modified as patient reports baseline pain/arthritis with left shoulder (reports she needs a TSR)     EDUCATION:  Education  Individual(s) Educated: Patient  Education Provided:  (UE HEP, safe transfers, walker safety)  Patient Response to Education: Patient/Caregiver Verbalized Understanding of Information, Patient/Caregiver Performed Return Demonstration of Exercises/Activities    Goals:  Encounter Problems       Encounter Problems (Active)       Bathing       LTG - Patient will utilize adaptive techniques to bathe body sba (Progressing)       Start:  03/08/24    Expected End:  03/13/24               Dressing Upper Extremities       LTG - Patient will complete upper body dressing mod I (Progressing)       Start:  03/08/24    Expected End:  03/13/24               Dressings Lower Extremities       LTG - Patient will dress lower body/hip brace min a (Progressing)       Start:  03/08/24    Expected End:  03/13/24               Functional Mobility       Completes functional mobility via melissa mod I (Progressing)       Start:  03/08/24    Expected End:  03/13/24               Grooming       LTG - Patient will complete daily grooming tasks indep (Progressing)       Start:  03/08/24    Expected End:  03/13/24               Instrumental Activities of Daily Living       LTG - Patient will complete simple kitchen access/meal preparation activities supervision via melissa (Progressing)       Start:  03/08/24    Expected End:  03/13/24               OT Transfers       LTG - Patient will transfer to commode mod I (Progressing)       Start:  03/08/24    Expected End:  03/13/24            LTG - Patient will transfer to walk in shower/dme sba (Progressing)       Start:  03/08/24    Expected End:  03/13/24

## 2024-03-09 NOTE — PROGRESS NOTES
Occupational Therapy    OT Treatment    Patient Name: Maricel Tomlin  MRN: [id number]  Today's Date: 3/9/2024  Time Calculation  Start Time: 1045  Stop Time: 1130  Time Calculation (min): 45 min         Assessment:  End of Session Communication: Bedside nurse  End of Session Patient Position:  (patient assisted to commode, nursing aware)     Plan:  Treatment Interventions: ADL retraining, Functional transfer training, UE strengthening/ROM, Endurance training, Patient/family training, Equipment evaluation/education, Compensatory technique education, Continued evaluation  OT Frequency: 5 times per week (x6/wk prn)  OT Discharge Recommendations:  (mod I with intermittent assist for hip brace/iadl's)  Equipment Recommended upon Discharge: Wheeled walker, Bedside commode (hip kit)  Treatment Interventions: ADL retraining, Functional transfer training, UE strengthening/ROM, Endurance training, Patient/family training, Equipment evaluation/education, Compensatory technique education, Continued evaluation    Subjective   Previous Visit Info:  OT Last Visit  OT Received On: 03/09/24    General:  General  Prior to Session Communication: Bedside nurse  Patient Position Received: Alarm on, Up in chair    Precautions:  Medical Precautions: Fall precautions  Post-Surgical Precautions: Left hip precautions  Braces Applied: Left hip abductor brace on at all times       Pain:  Pain Assessment  Pain Assessment: 0-10  Pain Score: 7  Pain Location:  (bilateral ankles 7/10; left hip 4/10)  Pain Interventions: Medication (See MAR), Repositioned, Distraction    Objective         Activities of Daily Living: Toileting  Toileting Comments: cga for clothing management and hygiene standing at walker; patient able to manage clothing with hip abductor brace on throughout     Functional Standing Tolerance:  Time: 2:37, 2:08  Functional Standing Tolerance Comments: sba, with and without ue support    Bed Mobility/Transfers: Transfers  Transfer:   (sba-cga sit to stand transfers from wheelchair; cga transfer to/from 3-in-1 commode via wheeled walker)      Functional Mobility:  Functional Mobility  Functional Mobility Performed:  (cga simple functional mobility/bathroom access via wheeled walker)       Therapy/Activity: Therapeutic Exercise  Therapeutic Exercise Performed:  (completes bilateral ue tracy exercises (unweighted) 10 reps x 3 sets x 3 exercises, with supervision to promote improved endurance for ADL completion)    EDUCATION:  Education  Individual(s) Educated: Patient  Education Provided:  (walker safety, safe transfers)  Patient Response to Education: Patient/Caregiver Verbalized Understanding of Information, Patient/Caregiver Performed Return Demonstration of Exercises/Activities    Goals:  Encounter Problems       Encounter Problems (Active)       Bathing       LTG - Patient will utilize adaptive techniques to bathe body sba (Progressing)       Start:  03/08/24    Expected End:  03/13/24               Dressing Upper Extremities       LTG - Patient will complete upper body dressing mod I (Progressing)       Start:  03/08/24    Expected End:  03/13/24               Dressings Lower Extremities       LTG - Patient will dress lower body/hip brace min a (Progressing)       Start:  03/08/24    Expected End:  03/13/24               Functional Mobility       Completes functional mobility via wwalker mod I (Progressing)       Start:  03/08/24    Expected End:  03/13/24               Grooming       LTG - Patient will complete daily grooming tasks indep (Progressing)       Start:  03/08/24    Expected End:  03/13/24               Instrumental Activities of Daily Living       LTG - Patient will complete simple kitchen access/meal preparation activities supervision via wwalker (Progressing)       Start:  03/08/24    Expected End:  03/13/24               OT Transfers       LTG - Patient will transfer to commode mod I (Progressing)       Start:  03/08/24     Expected End:  03/13/24            LTG - Patient will transfer to walk in shower/dme sba (Progressing)       Start:  03/08/24    Expected End:  03/13/24

## 2024-03-09 NOTE — CARE PLAN
The patient's goals for the shift include safety pain management    The clinical goals for the shift include safety/pain management    Problem: Pain  Goal: My pain/discomfort is manageable  Outcome: Progressing     Problem: Daily Care  Goal: Daily care needs are met  Outcome: Progressing     Problem: Psychosocial Needs  Goal: Demonstrates ability to cope with hospitalization/illness  Outcome: Progressing

## 2024-03-09 NOTE — PROGRESS NOTES
Physical Therapy    Physical Therapy Treatment    Patient Name: Maricel Tomlin  MRN: [id number]  Today's Date: 3/9/2024  Time Calculation  Start Time: 0830  Stop Time: 0915  Time Calculation (min): 45 min       Assessment/Plan         PT Plan  Treatment/Interventions: Bed mobility, Transfer training, Balance training, Stair training, Gait training, Strengthening, Endurance training, Therapeutic exercise, Therapeutic activity  PT Plan: Skilled PT  PT Frequency:  (5x/week, 6x prn)  PT Discharge Recommendations:  (Anticipate discharge home mod independent at the walker level)  Equipment Recommended upon Discharge: Wheeled walker      General Visit Information:   PT  Visit  PT Received On: 03/09/24  General  Patient Position Received: Up in chair, Alarm on    Subjective   Precautions:  Precautions  Medical Precautions: Fall precautions  Braces Applied: Left hip abductor brace on at all times       Objective   Pain:  Pain Assessment  Pain Score:  (8/10 left foot and ankle, 5/10 left hip)  Pain Interventions: Medication (See MAR)     Treatments:  Therapeutic Exercise  Therapeutic Exercise Performed: Yes  Therapeutic Exercise Activity 1: pt performed seated exercises consisting of AP, GS, LAQ, marches (RLE only), ADD and foot slides x10-20 reps to facilitate improvement in functional mobility.    Ambulation/Gait Training  Ambulation/Gait Training Performed: Yes  Ambulation/Gait Training 1  Comments/Distance (ft) 1: gait training 100ft and 50ft with ww and CGA.    Transfers  Transfer: Yes  Transfer 1  Trials/Comments 1: transfer training sit/stand with CGA.    Stairs  Stairs: Yes  Stairs  Comment/Number of Steps 1: stair training up/down 5 steps with 2 rails, step-to pattern and CGA.      Education Documentation      Encounter Problems       Encounter Problems (Active)       PT Problem       LTG - Mod independent bed mobility. (Progressing)       Start:  03/08/24    Expected End:  03/13/24            LTG - Mod independent  transfers. (Progressing)       Start:  03/08/24    Expected End:  03/13/24            LTG - Pt amb 100 ft with wheeled walker mod independent. (Progressing)       Start:  03/08/24    Expected End:  03/13/24            LTG - Pt will negotiate 5 stairs with bilateral rails and CGA (Progressing)       Start:  03/08/24    Expected End:  03/13/24            STG - Bed mobility with CGA (Progressing)       Start:  03/08/24    Expected End:  03/11/24            STG - Transfers with CGA (Progressing)       Start:  03/08/24    Expected End:  03/11/24            STG - Pt will amb 50 ft x3 with wheeled walker and CGA (Progressing)       Start:  03/08/24    Expected End:  03/11/24            STG - Pt will negotiate 3 stairs with bilateral rails and min assist. (Progressing)       Start:  03/08/24    Expected End:  03/11/24

## 2024-03-10 PROCEDURE — 2500000001 HC RX 250 WO HCPCS SELF ADMINISTERED DRUGS (ALT 637 FOR MEDICARE OP)

## 2024-03-10 PROCEDURE — 1180000001 HC REHAB PRIVATE ROOM DAILY

## 2024-03-10 PROCEDURE — 2500000002 HC RX 250 W HCPCS SELF ADMINISTERED DRUGS (ALT 637 FOR MEDICARE OP, ALT 636 FOR OP/ED): Mod: MUE

## 2024-03-10 PROCEDURE — 2500000002 HC RX 250 W HCPCS SELF ADMINISTERED DRUGS (ALT 637 FOR MEDICARE OP, ALT 636 FOR OP/ED): Mod: MUE | Performed by: PHYSICAL MEDICINE & REHABILITATION

## 2024-03-10 PROCEDURE — 2500000002 HC RX 250 W HCPCS SELF ADMINISTERED DRUGS (ALT 637 FOR MEDICARE OP, ALT 636 FOR OP/ED)

## 2024-03-10 PROCEDURE — 2500000001 HC RX 250 WO HCPCS SELF ADMINISTERED DRUGS (ALT 637 FOR MEDICARE OP): Performed by: PHYSICAL MEDICINE & REHABILITATION

## 2024-03-10 PROCEDURE — 94640 AIRWAY INHALATION TREATMENT: CPT

## 2024-03-10 PROCEDURE — 2500000002 HC RX 250 W HCPCS SELF ADMINISTERED DRUGS (ALT 637 FOR MEDICARE OP, ALT 636 FOR OP/ED): Performed by: PHYSICAL MEDICINE & REHABILITATION

## 2024-03-10 RX ADMIN — LOSARTAN POTASSIUM 50 MG: 50 TABLET, FILM COATED ORAL at 08:48

## 2024-03-10 RX ADMIN — OXYCODONE HYDROCHLORIDE 5 MG: 5 TABLET ORAL at 14:12

## 2024-03-10 RX ADMIN — CARVEDILOL 12.5 MG: 12.5 TABLET, FILM COATED ORAL at 16:52

## 2024-03-10 RX ADMIN — SENNOSIDES 8.6 MG: 8.6 TABLET, FILM COATED ORAL at 20:30

## 2024-03-10 RX ADMIN — TIOTROPIUM BROMIDE INHALATION SPRAY 2 PUFF: 3.12 SPRAY, METERED RESPIRATORY (INHALATION) at 06:52

## 2024-03-10 RX ADMIN — ESCITALOPRAM OXALATE 20 MG: 10 TABLET ORAL at 08:48

## 2024-03-10 RX ADMIN — RIVAROXABAN 20 MG: 20 TABLET, FILM COATED ORAL at 16:52

## 2024-03-10 RX ADMIN — ATORVASTATIN CALCIUM 40 MG: 40 TABLET, FILM COATED ORAL at 08:48

## 2024-03-10 RX ADMIN — GABAPENTIN 900 MG: 300 CAPSULE ORAL at 20:30

## 2024-03-10 RX ADMIN — ZOLPIDEM TARTRATE 5 MG: 5 TABLET, COATED ORAL at 20:30

## 2024-03-10 RX ADMIN — BUPROPION HYDROCHLORIDE 300 MG: 150 TABLET, FILM COATED, EXTENDED RELEASE ORAL at 08:48

## 2024-03-10 RX ADMIN — MONTELUKAST 10 MG: 10 TABLET, FILM COATED ORAL at 20:30

## 2024-03-10 RX ADMIN — GABAPENTIN 900 MG: 300 CAPSULE ORAL at 08:48

## 2024-03-10 RX ADMIN — FLUTICASONE FUROATE AND VILANTEROL TRIFENATATE 1 PUFF: 100; 25 POWDER RESPIRATORY (INHALATION) at 06:55

## 2024-03-10 RX ADMIN — CARVEDILOL 12.5 MG: 12.5 TABLET, FILM COATED ORAL at 08:48

## 2024-03-10 RX ADMIN — LEVOTHYROXINE SODIUM 175 MCG: 150 TABLET ORAL at 05:53

## 2024-03-10 RX ADMIN — PANTOPRAZOLE SODIUM 40 MG: 40 TABLET, DELAYED RELEASE ORAL at 06:00

## 2024-03-10 ASSESSMENT — PAIN SCALES - GENERAL
PAINLEVEL_OUTOF10: 0 - NO PAIN
PAINLEVEL_OUTOF10: 3
PAINLEVEL_OUTOF10: 4
PAINLEVEL_OUTOF10: 7

## 2024-03-10 ASSESSMENT — PAIN DESCRIPTION - LOCATION: LOCATION: HIP

## 2024-03-10 ASSESSMENT — PAIN DESCRIPTION - ORIENTATION: ORIENTATION: LEFT

## 2024-03-10 ASSESSMENT — PAIN - FUNCTIONAL ASSESSMENT
PAIN_FUNCTIONAL_ASSESSMENT: 0-10
PAIN_FUNCTIONAL_ASSESSMENT: 0-10

## 2024-03-10 NOTE — CARE PLAN
The patient's goals for the shift include safety pain management    The clinical goals for the shift include to remain hemodynamically stable through EOS      Problem: Pain  Goal: My pain/discomfort is manageable  Outcome: Progressing     Problem: Safety  Goal: Patient will be injury free during hospitalization  Outcome: Progressing  Goal: I will remain free of falls  Outcome: Progressing     Problem: Daily Care  Goal: Daily care needs are met  Outcome: Progressing     Problem: Psychosocial Needs  Goal: Demonstrates ability to cope with hospitalization/illness  Outcome: Progressing  Goal: Collaborate with me, my family, and caregiver to identify my specific goals  Outcome: Progressing

## 2024-03-11 PROBLEM — E66.01 MORBID OBESITY DUE TO EXCESS CALORIES (MULTI): Status: ACTIVE | Noted: 2024-03-11

## 2024-03-11 PROCEDURE — 2500000001 HC RX 250 WO HCPCS SELF ADMINISTERED DRUGS (ALT 637 FOR MEDICARE OP): Performed by: PHYSICAL MEDICINE & REHABILITATION

## 2024-03-11 PROCEDURE — 1180000001 HC REHAB PRIVATE ROOM DAILY

## 2024-03-11 PROCEDURE — 2500000002 HC RX 250 W HCPCS SELF ADMINISTERED DRUGS (ALT 637 FOR MEDICARE OP, ALT 636 FOR OP/ED): Mod: MUE

## 2024-03-11 PROCEDURE — 97116 GAIT TRAINING THERAPY: CPT | Mod: GP,CQ

## 2024-03-11 PROCEDURE — 2500000002 HC RX 250 W HCPCS SELF ADMINISTERED DRUGS (ALT 637 FOR MEDICARE OP, ALT 636 FOR OP/ED)

## 2024-03-11 PROCEDURE — 94640 AIRWAY INHALATION TREATMENT: CPT

## 2024-03-11 PROCEDURE — 97530 THERAPEUTIC ACTIVITIES: CPT | Mod: GO,CO

## 2024-03-11 PROCEDURE — 2500000002 HC RX 250 W HCPCS SELF ADMINISTERED DRUGS (ALT 637 FOR MEDICARE OP, ALT 636 FOR OP/ED): Mod: MUE | Performed by: PHYSICAL MEDICINE & REHABILITATION

## 2024-03-11 PROCEDURE — 97110 THERAPEUTIC EXERCISES: CPT | Mod: GP,CQ

## 2024-03-11 PROCEDURE — 97530 THERAPEUTIC ACTIVITIES: CPT | Mod: GP,CQ

## 2024-03-11 PROCEDURE — 97535 SELF CARE MNGMENT TRAINING: CPT | Mod: GO,CO

## 2024-03-11 PROCEDURE — 2500000005 HC RX 250 GENERAL PHARMACY W/O HCPCS

## 2024-03-11 PROCEDURE — 99232 SBSQ HOSP IP/OBS MODERATE 35: CPT

## 2024-03-11 PROCEDURE — 2500000001 HC RX 250 WO HCPCS SELF ADMINISTERED DRUGS (ALT 637 FOR MEDICARE OP)

## 2024-03-11 PROCEDURE — 2500000002 HC RX 250 W HCPCS SELF ADMINISTERED DRUGS (ALT 637 FOR MEDICARE OP, ALT 636 FOR OP/ED): Performed by: PHYSICAL MEDICINE & REHABILITATION

## 2024-03-11 RX ADMIN — BUPROPION HYDROCHLORIDE 300 MG: 150 TABLET, FILM COATED, EXTENDED RELEASE ORAL at 09:12

## 2024-03-11 RX ADMIN — LOSARTAN POTASSIUM 50 MG: 50 TABLET, FILM COATED ORAL at 09:12

## 2024-03-11 RX ADMIN — AZELASTINE HYDROCHLORIDE 2 SPRAY: 137 SPRAY, METERED NASAL at 09:13

## 2024-03-11 RX ADMIN — GABAPENTIN 900 MG: 300 CAPSULE ORAL at 09:12

## 2024-03-11 RX ADMIN — OXYCODONE HYDROCHLORIDE 5 MG: 5 TABLET ORAL at 12:23

## 2024-03-11 RX ADMIN — LEVOTHYROXINE SODIUM 175 MCG: 150 TABLET ORAL at 06:31

## 2024-03-11 RX ADMIN — ZOLPIDEM TARTRATE 5 MG: 5 TABLET, COATED ORAL at 20:04

## 2024-03-11 RX ADMIN — SEMAGLUTIDE 2 MG: 2.68 INJECTION, SOLUTION SUBCUTANEOUS at 16:13

## 2024-03-11 RX ADMIN — CARVEDILOL 12.5 MG: 12.5 TABLET, FILM COATED ORAL at 09:12

## 2024-03-11 RX ADMIN — CARVEDILOL 12.5 MG: 12.5 TABLET, FILM COATED ORAL at 17:24

## 2024-03-11 RX ADMIN — SENNOSIDES 8.6 MG: 8.6 TABLET, FILM COATED ORAL at 20:04

## 2024-03-11 RX ADMIN — TRAMADOL HYDROCHLORIDE 50 MG: 50 TABLET, COATED ORAL at 20:04

## 2024-03-11 RX ADMIN — MONTELUKAST 10 MG: 10 TABLET, FILM COATED ORAL at 20:04

## 2024-03-11 RX ADMIN — GABAPENTIN 900 MG: 300 CAPSULE ORAL at 20:04

## 2024-03-11 RX ADMIN — FLUTICASONE FUROATE AND VILANTEROL TRIFENATATE 1 PUFF: 100; 25 POWDER RESPIRATORY (INHALATION) at 06:20

## 2024-03-11 RX ADMIN — PANTOPRAZOLE SODIUM 40 MG: 40 TABLET, DELAYED RELEASE ORAL at 06:31

## 2024-03-11 RX ADMIN — TIOTROPIUM BROMIDE INHALATION SPRAY 2 PUFF: 3.12 SPRAY, METERED RESPIRATORY (INHALATION) at 06:18

## 2024-03-11 RX ADMIN — RIVAROXABAN 20 MG: 20 TABLET, FILM COATED ORAL at 17:24

## 2024-03-11 RX ADMIN — ATORVASTATIN CALCIUM 40 MG: 40 TABLET, FILM COATED ORAL at 09:12

## 2024-03-11 RX ADMIN — ESCITALOPRAM OXALATE 20 MG: 10 TABLET ORAL at 09:12

## 2024-03-11 ASSESSMENT — PAIN - FUNCTIONAL ASSESSMENT
PAIN_FUNCTIONAL_ASSESSMENT: 0-10

## 2024-03-11 ASSESSMENT — PAIN SCALES - GENERAL
PAINLEVEL_OUTOF10: 1
PAINLEVEL_OUTOF10: 6
PAINLEVEL_OUTOF10: 6
PAINLEVEL_OUTOF10: 0 - NO PAIN
PAINLEVEL_OUTOF10: 8
PAINLEVEL_OUTOF10: 0 - NO PAIN
PAINLEVEL_OUTOF10: 0 - NO PAIN
PAINLEVEL_OUTOF10: 8
PAINLEVEL_OUTOF10: 8

## 2024-03-11 ASSESSMENT — ACTIVITIES OF DAILY LIVING (ADL): HOME_MANAGEMENT_TIME_ENTRY: 30

## 2024-03-11 ASSESSMENT — PAIN DESCRIPTION - LOCATION: LOCATION: LEG

## 2024-03-11 NOTE — CARE PLAN
Problem: Pain  Goal: My pain/discomfort is manageable  3/11/2024 1934 by Hailey East RN  Outcome: Progressing  3/11/2024 1931 by Hailey East RN  Outcome: Progressing     Problem: Safety  Goal: Patient will be injury free during hospitalization  3/11/2024 1934 by Hailey East RN  Outcome: Progressing  3/11/2024 1931 by Hailey East RN  Outcome: Progressing  Goal: I will remain free of falls  3/11/2024 1934 by Hailey East RN  Outcome: Progressing  3/11/2024 1931 by Hailey East RN  Outcome: Progressing     Problem: Daily Care  Goal: Daily care needs are met  3/11/2024 1934 by Hailey East RN  Outcome: Progressing  3/11/2024 1931 by Hailey East RN  Outcome: Progressing     Problem: Psychosocial Needs  Goal: Demonstrates ability to cope with hospitalization/illness  3/11/2024 1934 by Hailey East RN  Outcome: Progressing  3/11/2024 1931 by Hailey East RN  Outcome: Progressing  Goal: Collaborate with me, my family, and caregiver to identify my specific goals  3/11/2024 1934 by Hailey East RN  Outcome: Progressing  3/11/2024 1931 by Hailey East RN  Outcome: Progressing

## 2024-03-11 NOTE — CARE PLAN
The patient's goals for the shift include Participate in scheduled therapy sessions     The clinical goals for the shift include pt will remain pain free during shift    Problem: Pain  Goal: My pain/discomfort is manageable  Outcome: Progressing     Problem: Safety  Goal: Patient will be injury free during hospitalization  Outcome: Progressing  Goal: I will remain free of falls  Outcome: Progressing     Problem: Daily Care  Goal: Daily care needs are met  Outcome: Progressing     Problem: Psychosocial Needs  Goal: Demonstrates ability to cope with hospitalization/illness  Outcome: Progressing  Goal: Collaborate with me, my family, and caregiver to identify my specific goals  Outcome: Progressing

## 2024-03-11 NOTE — PROGRESS NOTES
Physical Therapy    Physical Therapy Treatment    Patient Name: Maricel Tomlin  MRN: [id number]  Today's Date: 3/11/2024  Time Calculation  Start Time: 1000  Stop Time: 1045  Time Calculation (min): 45 min       Assessment/Plan         PT Plan  Treatment/Interventions: Bed mobility, Transfer training, Balance training, Stair training, Gait training, Strengthening, Endurance training, Therapeutic exercise, Therapeutic activity  PT Plan: Skilled PT  PT Frequency:  (5x/week, 6x prn)  PT Discharge Recommendations:  (Anticipate discharge home mod independent at the walker level)  Equipment Recommended upon Discharge: Wheeled walker      General Visit Information:   PT  Visit  PT Received On: 03/11/24  General  Patient Position Received: Up in chair, Alarm on    Subjective   Precautions:  Precautions  Medical Precautions: Fall precautions  Post-Surgical Precautions: Left hip precautions  Braces Applied: Left hip abductor brace on at all times     Objective   Treatments:  Therapeutic Exercise  Therapeutic Exercise Performed: Yes  Therapeutic Exercise Activity 1: pt performed seated exercises consisting of AP, GS, LAQ, marches, ADD and foot slides x20 reps to facilitate improvement in functional mobility.    Ambulation/Gait Training  Ambulation/Gait Training Performed: Yes  Ambulation/Gait Training 1  Comments/Distance (ft) 1: gait training 100ft x2 with ww and SBA. LLE IR during amb which pt states has bedn that way since THR 10 years ago.    Transfers  Transfer: Yes  Transfer 1  Trials/Comments 1: transfer training sit/stand with supervision.    Stairs  Stairs: Yes  Stairs  Comment/Number of Steps 1: stair training up/down 5 steps with 2 rails, step-to pattern and CGA.      Education Documentation  Patient educated in safe and proper transfer techniques including proper positioning and hand/foot placement to maximize safety and functional independence.  Patient educated in safe techniques for gait with a device in order to  maximize safety and functional independence.  Patient educated in stair training techniques including proper sequencing, hand advancement on rails and safe foot placement to maximize functional independence.          Encounter Problems       Encounter Problems (Active)       PT Problem       LTG - Mod independent bed mobility. (Progressing)       Start:  03/08/24    Expected End:  03/13/24            LTG - Mod independent transfers. (Progressing)       Start:  03/08/24    Expected End:  03/13/24            LTG - Pt amb 100 ft with wheeled walker mod independent. (Progressing)       Start:  03/08/24    Expected End:  03/13/24            LTG - Pt will negotiate 5 stairs with bilateral rails and CGA (Progressing)       Start:  03/08/24    Expected End:  03/13/24            STG - Bed mobility with CGA (Progressing)       Start:  03/08/24    Expected End:  03/11/24            STG - Transfers with CGA (Progressing)       Start:  03/08/24    Expected End:  03/11/24            STG - Pt will amb 50 ft x3 with wheeled walker and CGA (Progressing)       Start:  03/08/24    Expected End:  03/11/24            STG - Pt will negotiate 3 stairs with bilateral rails and min assist. (Progressing)       Start:  03/08/24    Expected End:  03/11/24

## 2024-03-11 NOTE — CARE PLAN
The patient's goals for the shift include safety pain management    The clinical goals for the shift include pt will remain pain free during shift    Pt making progress towards shift goals

## 2024-03-11 NOTE — PROGRESS NOTES
"Maricel Tomlin is a 73 y.o. female on day 4 of admission presenting with Hip dislocation, left (CMS/HCC).    Subjective   Patient feels well this morning, only complaint is someone changed her diet to carb controlled due to high blood sugar. Home ozempic being given today as it was not brought in yesterday. She reports pain after therapy on Saturday but her pain is otherwise well controlled.       Objective     Physical Exam:    General: Resting comfortably in wc, no acute distress. Pleasant, cooperative.    Respiratory: Equal and symmetrical chest rise bilaterally. No respiratory distress. Diffuse rhonchi in all left fields, clear on right.  Cardiovascular: 2+ radial pulses bilaterally. Warm and well-perfused extremities. RRR.  Abdomen: Soft, non-tender, non-distended.    Skin: No obvious or apparent rashes on exposed skin.    Neuro: Alert and oriented to person, place, and time as evidenced by appropriate conversation. No word-finding difficulties.    MSK: No obvious deformities.  Strength 5/5 for upper and lower extremities, left hip flexion not tested due to brace  Psych: Appropriate mood and affect.    Function: cga to sba  Assessment/Plan        Last Recorded Vitals  Blood pressure (!) 139/92, pulse 78, temperature 36.7 °C (98.1 °F), resp. rate 18, height 1.65 m (5' 4.96\"), weight 119 kg (262 lb 7.3 oz), SpO2 93 %.    Therapy notes from last 24 hours reviewed.    Relevant Results                  Scheduled medications  atorvastatin, 40 mg, oral, Daily  azelastine, 2 spray, Each Nostril, BID  budesonide-glycopyr-formoterol, 2 puff, inhalation, BID  buPROPion XL, 300 mg, oral, Daily  carvedilol, 12.5 mg, oral, BID with meals  escitalopram, 20 mg, oral, Daily  fluticasone furoate-vilanteroL, 1 puff, inhalation, Daily  gabapentin, 900 mg, oral, BID  levothyroxine, 175 mcg, oral, Daily  losartan, 50 mg, oral, Daily  montelukast, 10 mg, oral, Nightly  pantoprazole, 40 mg, oral, Daily  rivaroxaban, 20 mg, oral, Daily " with evening meal  semaglutide, 2 mg, subcutaneous, q7 days  sennosides, 1 tablet, oral, Nightly  tiotropium, 2 puff, inhalation, Daily  zolpidem, 5 mg, oral, Nightly      Continuous medications     PRN medications  PRN medications: acetaminophen, albuterol, alum-mag hydroxide-simeth, benzonatate, bisacodyl, bisacodyl, melatonin, oxyCODONE, traMADol     No results found for this or any previous visit (from the past 24 hour(s)).              Assessment/Plan   Principal Problem:    Hip dislocation, left (CMS/[facility])  Active Problems:    HTN (hypertension)    Hyperlipidemia    Atrial fibrillation (CMS/[facility])    Diabetes mellitus, type 2 (CMS/[facility])    Hypothyroidism    Asthma    Chronic obstructive pulmonary disease (CMS/[facility])    Dislocation of left hip, subsequent encounter    Dislocation of left hip, initial encounter (CMS/[facility])    Hip dislocation, left, sequela    Morbid obesity due to excess calories (CMS/[facility])        #Impaired mobility and Impaired independence with ADLs and I/ADLs 2/2 left hip dislocation  - PT/OT 5-7 days per week 3 hours per day to maximize safety and independence with functional mobility and self-care  - Start PT, working on bed mobility, transfers, balance, endurance, strength, gait, eval for appropriate assistive device  - Start OT, working on functional cognition, functional mobility, upper limb strength/coordination, balance, endurance, eval for appropriate adaptive equipment, ADLs, and I/ADLs.  - Goal Mod I for mobility/transfers and basic ADLs     #Left hip dislocation  -difficult to reduce, required 2 OR attempts under general. FU Dr. Nicole in 3 weeks.  -Hip precautions  -Pain control with tylenol 975mg q6hprn, oxycodone 5mg q6hprn, tramadol 50mg q6hprn     #CV: A-fib/a flutter, , hyperlipidemia, hypertension,   - cont home atorvastatin, carvedilol, losartan, xarelto 20mg     #DM  - cont home ozempic that patient family is bringing in  - patient reports not taking anything else for DM, consider  SSI if warranted - patient refused ssi and regular glucose checks. Carb control diet     #asthma  -cont home Breztri (patient family bringing in), albuterol prn, singulair, spiriva     #Hx of OA and chronic LBP  - cont gabapentin 900mg BID  - norco at home, oxycodone as above     #Hypothyroidism  -Patient reports taking 175mcg daily except none on Sunday and 0.5 tabs on Monday     #Insomnia  -cont home zolpidem 5mg qhs     Bowel/Bladder  -facilitate daily active BM   -continence of bladder per timed void schedule and rehab nursing  -check PVRs and treat appropriately     DVT prophylaxis  -SCDs and ambulation  -Chemoprophylaxis with home xarelto for DVT     FEN  -follow BMP and treat appropriately  -monitor oral intake daily    3/11:  -vitals stable  -walking 100' fww with cga  -pain control with oxycodone  -administer ozempic today       Patient seen and discussed with attending physician. I spent 35 minutes in the professional and overall care of this patient.    Jose Jiménez DO, MBA, PGY-2  Physical Medicine and Rehabilitation

## 2024-03-11 NOTE — PROGRESS NOTES
3/11/24:  Spoke with patient bedside, introduced self and explained role.  Patient lives with her spouse in a one level home with 5 steps to enter.  Prior to admission the patient was independent at home and has a rollator, walker and cane available.  Discussed [facility] and patient would like to DC Wednesday 3/13 because she has an appointment at 8am she does not want to miss.  She does not think she will need HHC upon DC, but [facility].  Will continue to update regarding DC planning. -Uziel Hawk RN    3/12/24:  Patient ready to DC home tomorrow 3/13. -Uziel Hawk RN    3/13/24:  Patient DC home today with no follow up therapy recommendation.  -Uziel Hawk RN    3/15/24 :  Follow up DC call:  patient is doing well and has no questions at this time. -Uziel Hawk RN

## 2024-03-11 NOTE — PROGRESS NOTES
Physical Therapy    Physical Therapy Treatment    Patient Name: Maricel Tomlin  MRN: [id number]  Today's Date: 3/11/2024  Time Calculation  Start Time: 1300  Stop Time: 1345  Time Calculation (min): 45 min       Assessment/Plan   PT Assessment  End of Session Patient Position: Bed, 2 rail up, Alarm on     PT Plan  Treatment/Interventions: Bed mobility, Transfer training, Balance training, Stair training, Gait training, Strengthening, Endurance training, Therapeutic exercise, Therapeutic activity  PT Plan: Skilled PT  PT Frequency:  (5x/week, 6x prn)  PT Discharge Recommendations:  (Anticipate discharge home mod independent at the walker level)  Equipment Recommended upon Discharge: Wheeled walker      General Visit Information:   PT  Visit  PT Received On: 03/11/24  General  Patient Position Received: Up in chair, Alarm on    Subjective   Precautions:  Precautions  LE Weight Bearing Status: Weight Bearing as Tolerated  Medical Precautions: Fall precautions  Post-Surgical Precautions: Left hip precautions    Objective   Pain:  Pain Assessment  Pain Assessment: 0-10  Pain Score: 8  Pain Location:  (L hip)  Pain Interventions: Repositioned, Distraction      Treatments:  Therapeutic Exercise  Therapeutic Exercise Performed: Yes    Bed Mobility  Bed Mobility:  (Pt performs sit/supine with SBA, cues to ensure not crossing legs. Pt rolls R with SBA/set up to place pillow to follow hip precautions.  Unable to roll L secondary to surgery and L hip abductor brace)    Ambulation/Gait Training  Ambulation/Gait Training Performed:  (Pt ambulates 100 ft x 2 in figure 8 pattern with FWW and supervision.  Pt ambulates 50 ft weaving in and out of 5 standing bolsters about 3 ft apart with FWW and SBA, cues to maintain hip precautions.)  Transfers  Transfer:  (Pt performs sit/stand transfers with supervision)      Education Documentation  Pt educated on techniques for transfers and gait training with device in order to maximize safety and  independence.  Pt educated on therapeutic exercises, including optimal techniques to maximize function.        Encounter Problems       Encounter Problems (Active)       PT Problem       LTG - Mod independent bed mobility. (Progressing)       Start:  03/08/24    Expected End:  03/13/24            LTG - Mod independent transfers. (Progressing)       Start:  03/08/24    Expected End:  03/13/24            LTG - Pt amb 100 ft with wheeled walker mod independent. (Progressing)       Start:  03/08/24    Expected End:  03/13/24            LTG - Pt will negotiate 5 stairs with bilateral rails and CGA (Progressing)       Start:  03/08/24    Expected End:  03/13/24            STG - Bed mobility with CGA (Progressing)       Start:  03/08/24    Expected End:  03/11/24            STG - Transfers with CGA (Progressing)       Start:  03/08/24    Expected End:  03/11/24            STG - Pt will amb 50 ft x3 with wheeled walker and CGA (Progressing)       Start:  03/08/24    Expected End:  03/11/24            STG - Pt will negotiate 3 stairs with bilateral rails and min assist. (Progressing)       Start:  03/08/24    Expected End:  03/11/24

## 2024-03-11 NOTE — PROGRESS NOTES
Occupational Therapy    OT Treatment    Patient Name: Maricel Tomlin  MRN: [id number]  Today's Date: 3/11/2024  Time Calculation  Start Time: 0915  Stop Time: 1000  Time Calculation (min): 45 min  Plan:  Treatment Interventions: ADL retraining, Functional transfer training, UE strengthening/ROM, Endurance training, Patient/family training, Equipment evaluation/education, Compensatory technique education, Continued evaluation  OT Frequency: 5 times per week (x6/wk prn)  OT Discharge Recommendations:  (mod I with intermittent assist for hip brace/iadl's)  Equipment Recommended upon Discharge: Wheeled walker, Bedside commode (hip kit)  Treatment Interventions: ADL retraining, Functional transfer training, UE strengthening/ROM, Endurance training, Patient/family training, Equipment evaluation/education, Compensatory technique education, Continued evaluation    Subjective   Previous Visit Info:  OT Last Visit  OT Received On: 03/11/24  General:  General  Patient Position Received: Up in chair  Precautions:  Medical Precautions: Fall precautions  Post-Surgical Precautions: Left hip precautions  Braces Applied: Left hip abductor brace on at all times     Pain:  Pain Assessment  Pain Assessment: 0-10  Pain Score: 0 - No pain    Objective       Bed Mobility/Transfers: Toilet Transfers  Toilet Transfer Type: To  Toilet Transfer to: Raised toilet seat with rails  Toilet Transfer Technique: Ambulating  Toilet Transfers: Supervision  Shower Transfers  Shower Transfer Type: To and from  Shower Transfer Technique: Ambulating  Shower Transfers: Contact guard  Shower Transfers Comments: Foreard approach stepping over ledge into shower at walker level and turning to sit on shower chair; Patient has large walk in shower with double sliding doors      Functional Mobility:  Functional Mobility  Functional Mobility Performed: Yes  Functional Mobility 1  Comments 1: VIA a rolling walker completed simple functional mobility tasks including item  retrieval and side stepping with  Supervision and occasional cues for walker safety    EDUCATION: Reviewed walker safety and hip precautions with Patient as well as education regarding fall precautions with patient expressing good understanding.       Goals:  Encounter Problems       Encounter Problems (Active)       Bathing       LTG - Patient will utilize adaptive techniques to bathe body sba (Progressing)       Start:  03/08/24    Expected End:  03/13/24               Dressing Upper Extremities       LTG - Patient will complete upper body dressing mod I (Progressing)       Start:  03/08/24    Expected End:  03/13/24               Dressings Lower Extremities       LTG - Patient will dress lower body/hip brace min a (Progressing)       Start:  03/08/24    Expected End:  03/13/24               Functional Mobility       Completes functional mobility via ervinalker mod I (Progressing)       Start:  03/08/24    Expected End:  03/13/24               Grooming       LTG - Patient will complete daily grooming tasks indep (Progressing)       Start:  03/08/24    Expected End:  03/13/24               Instrumental Activities of Daily Living       LTG - Patient will complete simple kitchen access/meal preparation activities supervision via melissa (Progressing)       Start:  03/08/24    Expected End:  03/13/24               OT Transfers       LTG - Patient will transfer to commode mod I (Progressing)       Start:  03/08/24    Expected End:  03/13/24            LTG - Patient will transfer to walk in shower/dme sba (Progressing)       Start:  03/08/24    Expected End:  03/13/24

## 2024-03-11 NOTE — PROGRESS NOTES
Occupational Therapy    OT Treatment    Patient Name: Maricel Tomlin  MRN: [id number]  Today's Date: 3/11/2024  Time Calculation  Start Time: 1045  Stop Time: 1130  Time Calculation (min): 45 min         Assessment:        Plan:  Treatment Interventions: ADL retraining, Functional transfer training, UE strengthening/ROM, Endurance training, Patient/family training, Equipment evaluation/education, Compensatory technique education, Continued evaluation  OT Frequency: 5 times per week (x6/wk prn)  OT Discharge Recommendations:  (mod I with intermittent assist for hip brace/iadl's)  Equipment Recommended upon Discharge: Wheeled walker, Bedside commode (hip kit)  Treatment Interventions: ADL retraining, Functional transfer training, UE strengthening/ROM, Endurance training, Patient/family training, Equipment evaluation/education, Compensatory technique education, Continued evaluation    Subjective   Previous Visit Info:  OT Last Visit  OT Received On: 03/11/24  General:  General  Patient Position Received: Up in chair  Precautions:  Medical Precautions: Fall precautions  Post-Surgical Precautions: Left hip precautions  Braces Applied: Left hip abductor brace on at all times  Vital Signs:     Pain:  Pain Assessment  Pain Assessment: 0-10  Pain Score: 0 - No pain    Objective       Activities of Daily Living: LE Dressing  LE Dressing Adaptive Equipment: Reacher, Sock aide, Dressing stick  Sock Level of Assistance: Setup  Orthotics Level of Assistance:  (Unable to don hip abdutor brace without breaking hip precautions.)  Toileting  Toileting Level of Assistance: Supervision  Bed Mobility/Transfers: Toilet Transfers  Toilet Transfer Type: To and from  Toilet Transfer to: Standard bedside commode  Toilet Transfer Technique: Ambulating  Toilet Transfers: Supervision  IADL's: Kitchen Mobility  Kitchen Mobility Level of Assistance: Close supervision  Kitchen-Mobility Level: Walker  EDUCATION: Educated regarding fall precautions and  hip precautions with good understanding being expressed       Goals:  Encounter Problems       Encounter Problems (Active)       Bathing       LTG - Patient will utilize adaptive techniques to bathe body sba (Progressing)       Start:  03/08/24    Expected End:  03/13/24               Dressing Upper Extremities       LTG - Patient will complete upper body dressing mod I (Progressing)       Start:  03/08/24    Expected End:  03/13/24               Dressings Lower Extremities       LTG - Patient will dress lower body/hip brace min a (Progressing)       Start:  03/08/24    Expected End:  03/13/24               Functional Mobility       Completes functional mobility via wwalker mod I (Progressing)       Start:  03/08/24    Expected End:  03/13/24               Grooming       LTG - Patient will complete daily grooming tasks indep (Progressing)       Start:  03/08/24    Expected End:  03/13/24               Instrumental Activities of Daily Living       LTG - Patient will complete simple kitchen access/meal preparation activities supervision via wwalker (Progressing)       Start:  03/08/24    Expected End:  03/13/24               OT Transfers       LTG - Patient will transfer to commode mod I (Progressing)       Start:  03/08/24    Expected End:  03/13/24            LTG - Patient will transfer to walk in shower/dme sba (Progressing)       Start:  03/08/24    Expected End:  03/13/24

## 2024-03-12 PROBLEM — S73.005D DISLOCATION OF LEFT HIP, SUBSEQUENT ENCOUNTER: Status: RESOLVED | Noted: 2024-03-06 | Resolved: 2024-03-12

## 2024-03-12 PROBLEM — S73.005A: Status: RESOLVED | Noted: 2024-03-06 | Resolved: 2024-03-12

## 2024-03-12 PROBLEM — S73.005A HIP DISLOCATION, LEFT (MULTI): Status: RESOLVED | Noted: 2024-03-06 | Resolved: 2024-03-12

## 2024-03-12 LAB
ANION GAP SERPL CALC-SCNC: 11 MMOL/L (ref 10–20)
BUN SERPL-MCNC: 16 MG/DL (ref 6–23)
CALCIUM SERPL-MCNC: 10.2 MG/DL (ref 8.6–10.3)
CHLORIDE SERPL-SCNC: 97 MMOL/L (ref 98–107)
CO2 SERPL-SCNC: 34 MMOL/L (ref 21–32)
CREAT SERPL-MCNC: 0.75 MG/DL (ref 0.5–1.05)
EGFRCR SERPLBLD CKD-EPI 2021: 84 ML/MIN/1.73M*2
ERYTHROCYTE [DISTWIDTH] IN BLOOD BY AUTOMATED COUNT: 14.9 % (ref 11.5–14.5)
GLUCOSE SERPL-MCNC: 155 MG/DL (ref 74–99)
HCT VFR BLD AUTO: 37.6 % (ref 36–46)
HGB BLD-MCNC: 12.1 G/DL (ref 12–16)
MCH RBC QN AUTO: 27.6 PG (ref 26–34)
MCHC RBC AUTO-ENTMCNC: 32.2 G/DL (ref 32–36)
MCV RBC AUTO: 86 FL (ref 80–100)
NRBC BLD-RTO: 0 /100 WBCS (ref 0–0)
PLATELET # BLD AUTO: 369 X10*3/UL (ref 150–450)
POTASSIUM SERPL-SCNC: 4.6 MMOL/L (ref 3.5–5.3)
RBC # BLD AUTO: 4.39 X10*6/UL (ref 4–5.2)
SODIUM SERPL-SCNC: 137 MMOL/L (ref 136–145)
WBC # BLD AUTO: 12.6 X10*3/UL (ref 4.4–11.3)

## 2024-03-12 PROCEDURE — 85027 COMPLETE CBC AUTOMATED: CPT

## 2024-03-12 PROCEDURE — 1180000001 HC REHAB PRIVATE ROOM DAILY

## 2024-03-12 PROCEDURE — 2500000002 HC RX 250 W HCPCS SELF ADMINISTERED DRUGS (ALT 637 FOR MEDICARE OP, ALT 636 FOR OP/ED): Mod: MUE | Performed by: PHYSICAL MEDICINE & REHABILITATION

## 2024-03-12 PROCEDURE — 2500000002 HC RX 250 W HCPCS SELF ADMINISTERED DRUGS (ALT 637 FOR MEDICARE OP, ALT 636 FOR OP/ED)

## 2024-03-12 PROCEDURE — 99239 HOSP IP/OBS DSCHRG MGMT >30: CPT

## 2024-03-12 PROCEDURE — 2500000001 HC RX 250 WO HCPCS SELF ADMINISTERED DRUGS (ALT 637 FOR MEDICARE OP): Performed by: PHYSICAL MEDICINE & REHABILITATION

## 2024-03-12 PROCEDURE — 36415 COLL VENOUS BLD VENIPUNCTURE: CPT

## 2024-03-12 PROCEDURE — 97535 SELF CARE MNGMENT TRAINING: CPT | Mod: GO,CO

## 2024-03-12 PROCEDURE — 94640 AIRWAY INHALATION TREATMENT: CPT

## 2024-03-12 PROCEDURE — 2500000002 HC RX 250 W HCPCS SELF ADMINISTERED DRUGS (ALT 637 FOR MEDICARE OP, ALT 636 FOR OP/ED): Performed by: PHYSICAL MEDICINE & REHABILITATION

## 2024-03-12 PROCEDURE — 97530 THERAPEUTIC ACTIVITIES: CPT | Mod: GO,CO

## 2024-03-12 PROCEDURE — 2500000002 HC RX 250 W HCPCS SELF ADMINISTERED DRUGS (ALT 637 FOR MEDICARE OP, ALT 636 FOR OP/ED): Mod: MUE

## 2024-03-12 PROCEDURE — 80048 BASIC METABOLIC PNL TOTAL CA: CPT

## 2024-03-12 PROCEDURE — 2500000001 HC RX 250 WO HCPCS SELF ADMINISTERED DRUGS (ALT 637 FOR MEDICARE OP)

## 2024-03-12 RX ORDER — TRAMADOL HYDROCHLORIDE 50 MG/1
50 TABLET ORAL EVERY 6 HOURS PRN
Qty: 12 TABLET | Refills: 0 | Status: SHIPPED | OUTPATIENT
Start: 2024-03-12 | End: 2024-03-15

## 2024-03-12 RX ORDER — NITROFURANTOIN 25; 75 MG/1; MG/1
100 CAPSULE ORAL EVERY 12 HOURS SCHEDULED
Status: DISCONTINUED | OUTPATIENT
Start: 2024-03-12 | End: 2024-03-13 | Stop reason: HOSPADM

## 2024-03-12 RX ORDER — NITROFURANTOIN 25; 75 MG/1; MG/1
100 CAPSULE ORAL EVERY 12 HOURS SCHEDULED
Qty: 6 EACH | Refills: 0 | Status: SHIPPED | OUTPATIENT
Start: 2024-03-12 | End: 2024-03-15

## 2024-03-12 RX ADMIN — LOSARTAN POTASSIUM 50 MG: 50 TABLET, FILM COATED ORAL at 09:06

## 2024-03-12 RX ADMIN — GABAPENTIN 900 MG: 300 CAPSULE ORAL at 09:04

## 2024-03-12 RX ADMIN — PANTOPRAZOLE SODIUM 40 MG: 40 TABLET, DELAYED RELEASE ORAL at 06:09

## 2024-03-12 RX ADMIN — ZOLPIDEM TARTRATE 5 MG: 5 TABLET, COATED ORAL at 20:47

## 2024-03-12 RX ADMIN — CARVEDILOL 12.5 MG: 12.5 TABLET, FILM COATED ORAL at 16:48

## 2024-03-12 RX ADMIN — OXYCODONE HYDROCHLORIDE 5 MG: 5 TABLET ORAL at 16:38

## 2024-03-12 RX ADMIN — LEVOTHYROXINE SODIUM 175 MCG: 150 TABLET ORAL at 06:09

## 2024-03-12 RX ADMIN — ATORVASTATIN CALCIUM 40 MG: 40 TABLET, FILM COATED ORAL at 09:03

## 2024-03-12 RX ADMIN — TIOTROPIUM BROMIDE INHALATION SPRAY 2 PUFF: 3.12 SPRAY, METERED RESPIRATORY (INHALATION) at 08:07

## 2024-03-12 RX ADMIN — FLUTICASONE FUROATE AND VILANTEROL TRIFENATATE 1 PUFF: 100; 25 POWDER RESPIRATORY (INHALATION) at 08:07

## 2024-03-12 RX ADMIN — CARVEDILOL 12.5 MG: 12.5 TABLET, FILM COATED ORAL at 09:04

## 2024-03-12 RX ADMIN — OXYCODONE HYDROCHLORIDE 5 MG: 5 TABLET ORAL at 06:16

## 2024-03-12 RX ADMIN — GABAPENTIN 900 MG: 300 CAPSULE ORAL at 20:47

## 2024-03-12 RX ADMIN — NITROFURANTOIN MONOHYDRATE/MACROCRYSTALS 100 MG: 25; 75 CAPSULE ORAL at 20:47

## 2024-03-12 RX ADMIN — MONTELUKAST 10 MG: 10 TABLET, FILM COATED ORAL at 20:47

## 2024-03-12 RX ADMIN — BENZONATATE 200 MG: 100 CAPSULE ORAL at 09:02

## 2024-03-12 RX ADMIN — ESCITALOPRAM OXALATE 20 MG: 10 TABLET ORAL at 09:03

## 2024-03-12 RX ADMIN — BUPROPION HYDROCHLORIDE 300 MG: 150 TABLET, FILM COATED, EXTENDED RELEASE ORAL at 09:03

## 2024-03-12 RX ADMIN — RIVAROXABAN 20 MG: 20 TABLET, FILM COATED ORAL at 16:48

## 2024-03-12 RX ADMIN — SENNOSIDES 8.6 MG: 8.6 TABLET, FILM COATED ORAL at 20:47

## 2024-03-12 ASSESSMENT — PAIN - FUNCTIONAL ASSESSMENT
PAIN_FUNCTIONAL_ASSESSMENT: 0-10

## 2024-03-12 ASSESSMENT — ACTIVITIES OF DAILY LIVING (ADL)
HOME_MANAGEMENT_TIME_ENTRY: 30
BATHING_LEVEL_OF_ASSISTANCE: MINIMUM ASSISTANCE

## 2024-03-12 ASSESSMENT — PAIN SCALES - GENERAL
PAINLEVEL_OUTOF10: 4
PAINLEVEL_OUTOF10: 8
PAINLEVEL_OUTOF10: 8
PAINLEVEL_OUTOF10: 0 - NO PAIN
PAINLEVEL_OUTOF10: 0 - NO PAIN

## 2024-03-12 ASSESSMENT — BRIEF INTERVIEW FOR MENTAL STATUS (BIMS)
GENERAL MEMORY AND RECALL ABILITY: CURRENT SEASON;LOCATION OF OWN ROOM;STAFF NAMES AND FACES;RECOGNIZES APPROPRIATE HEALTHCARE SETTING

## 2024-03-12 ASSESSMENT — PAIN DESCRIPTION - LOCATION: LOCATION: HIP

## 2024-03-12 ASSESSMENT — PAIN DESCRIPTION - ORIENTATION: ORIENTATION: LEFT

## 2024-03-12 NOTE — PROGRESS NOTES
Occupational Therapy    OT Treatment    Patient Name: Maricel Tomlin  MRN: [id number]  Today's Date: 3/12/2024  Time Calculation  Start Time: 1315  Stop Time: 1345  Time Calculation (min): 30 min    Plan:  Treatment Interventions: ADL retraining, Functional transfer training, UE strengthening/ROM, Endurance training, Patient/family training, Equipment evaluation/education, Compensatory technique education, Continued evaluation  OT Frequency: 5 times per week (x6/wk prn)  OT Discharge Recommendations:  (mod I with intermittent assist for hip brace/iadl's)  Equipment Recommended upon Discharge: Wheeled walker, Bedside commode (hip kit)  Treatment Interventions: ADL retraining, Functional transfer training, UE strengthening/ROM, Endurance training, Patient/family training, Equipment evaluation/education, Compensatory technique education, Continued evaluation    Subjective     OT Last Visit  OT Received On: 03/12/24  General:  General  General Comment: spouse present for teaching  Precautions:  Precautions Comment: THR precautions and LLE hip abductor brace    Objective      Activities of Daily Living: LE Dressing  LE Dressing: Yes  LE Dressing Adaptive Equipment: Reacher, Sock aide, Dressing stick  Pants Level of Assistance: Modified independent  Sock Level of Assistance: Modified independent  Orthotics Level of Assistance:  (Patient dependent to don hip abductor brace in to proper alignment and to fasten. Patient able to log roll from side to side with pillow between legs to prevent internal rotation). Brace was donned in supine. Final adjustments to straps and to for proper alignment  were completed in standing.  EDUCATION:  Education  Education Comment: Patient and spouse participated in family teaching for home going: Patient and spouse only had concerns regarding the donning and doffing of abductor brace. Patient and spouse decline any teaching for transfers, functional mobility or ADL's. Spouse particpated in hands on  traning to don and doff brace. Spouse demonstrated good ability to assist patient at discharge.    Goals:  Encounter Problems       Encounter Problems (Active)       Bathing       LTG - Patient will utilize adaptive techniques to bathe body sba (Progressing)       Start:  03/08/24    Expected End:  03/13/24               Dressing Upper Extremities       LTG - Patient will complete upper body dressing mod I (Progressing)       Start:  03/08/24    Expected End:  03/13/24               Dressings Lower Extremities       LTG - Patient will dress lower body/hip brace min a (Progressing)       Start:  03/08/24    Expected End:  03/13/24               Functional Mobility       Completes functional mobility via ToonTimealker mod I (Progressing)       Start:  03/08/24    Expected End:  03/13/24               Grooming       LTG - Patient will complete daily grooming tasks indep (Progressing)       Start:  03/08/24    Expected End:  03/13/24               Instrumental Activities of Daily Living       LTG - Patient will complete simple kitchen access/meal preparation activities supervision via wwalker (Progressing)       Start:  03/08/24    Expected End:  03/13/24               OT Transfers       LTG - Patient will transfer to commode mod I (Progressing)       Start:  03/08/24    Expected End:  03/13/24            LTG - Patient will transfer to walk in shower/dme sba (Progressing)       Start:  03/08/24    Expected End:  03/13/24

## 2024-03-12 NOTE — PROGRESS NOTES
"Maricel Tomlin is a 73 y.o. female on day 5 of admission presenting with Hip dislocation, left (CMS/HCC).    Subjective   Patient feels well this morning, a little anxious about going home tomorrow morning and wasn't able to sleep last night so she would like to defer PT today. We confirmed with Dr. Nicole that she can take the abductor brace off for showers but she would prefer to not shower until she gets home where there aren't any unknowns. Otherwise she has no concerns.       Objective   Physical Exam:    General: Resting comfortably in wc, no acute distress. Pleasant, cooperative.    Respiratory: Equal and symmetrical chest rise bilaterally. No respiratory distress. Diffuse rhonchi in all left fields, clear on right.  Cardiovascular: 2+ radial pulses bilaterally. Warm and well-perfused extremities. RRR.  Abdomen: Soft, non-tender, non-distended.    Skin: No obvious or apparent rashes on exposed skin.    Neuro: Alert and oriented to person, place, and time as evidenced by appropriate conversation. No word-finding difficulties.    MSK: No obvious deformities.  Strength 5/5 for upper and lower extremities, left hip flexion not tested due to brace  Psych: Appropriate mood and affect.    Function: cga to sba    Last Recorded Vitals  Blood pressure 135/69, pulse 77, temperature 36.6 °C (97.9 °F), resp. rate 18, height 1.65 m (5' 4.96\"), weight 119 kg (262 lb 7.3 oz), SpO2 94 %.    Therapy notes from last 24 hours reviewed.    Relevant Results                  Scheduled medications  atorvastatin, 40 mg, oral, Daily  azelastine, 2 spray, Each Nostril, BID  budesonide-glycopyr-formoterol, 2 puff, inhalation, BID  buPROPion XL, 300 mg, oral, Daily  carvedilol, 12.5 mg, oral, BID with meals  escitalopram, 20 mg, oral, Daily  fluticasone furoate-vilanteroL, 1 puff, inhalation, Daily  gabapentin, 900 mg, oral, BID  levothyroxine, 175 mcg, oral, Daily  losartan, 50 mg, oral, Daily  montelukast, 10 mg, oral, " Nightly  pantoprazole, 40 mg, oral, Daily  rivaroxaban, 20 mg, oral, Daily with evening meal  semaglutide, 2 mg, subcutaneous, q7 days  sennosides, 1 tablet, oral, Nightly  tiotropium, 2 puff, inhalation, Daily  zolpidem, 5 mg, oral, Nightly      Continuous medications     PRN medications  PRN medications: acetaminophen, albuterol, alum-mag hydroxide-simeth, benzonatate, bisacodyl, bisacodyl, melatonin, oxyCODONE, traMADol     Results for orders placed or performed during the hospital encounter of 03/07/24 (from the past 24 hour(s))   Basic metabolic panel   Result Value Ref Range    Glucose 155 (H) 74 - 99 mg/dL    Sodium 137 136 - 145 mmol/L    Potassium 4.6 3.5 - 5.3 mmol/L    Chloride 97 (L) 98 - 107 mmol/L    Bicarbonate 34 (H) 21 - 32 mmol/L    Anion Gap 11 10 - 20 mmol/L    Urea Nitrogen 16 6 - 23 mg/dL    Creatinine 0.75 0.50 - 1.05 mg/dL    eGFR 84 >60 mL/min/1.73m*2    Calcium 10.2 8.6 - 10.3 mg/dL   CBC   Result Value Ref Range    WBC 12.6 (H) 4.4 - 11.3 x10*3/uL    nRBC 0.0 0.0 - 0.0 /100 WBCs    RBC 4.39 4.00 - 5.20 x10*6/uL    Hemoglobin 12.1 12.0 - 16.0 g/dL    Hematocrit 37.6 36.0 - 46.0 %    MCV 86 80 - 100 fL    MCH 27.6 26.0 - 34.0 pg    MCHC 32.2 32.0 - 36.0 g/dL    RDW 14.9 (H) 11.5 - 14.5 %    Platelets 369 150 - 450 x10*3/uL                 Assessment/Plan   Principal Problem:    Hip dislocation, left (CMS/HCC)  Active Problems:    HTN (hypertension)    Hyperlipidemia    Atrial fibrillation (CMS/HCC)    Diabetes mellitus, type 2 (CMS/HCC)    Hypothyroidism    Asthma    Chronic obstructive pulmonary disease (CMS/HCC)    Dislocation of left hip, subsequent encounter    Dislocation of left hip, initial encounter (CMS/[facility])    Hip dislocation, left, sequela    Morbid obesity due to excess calories (CMS/HCC)        #Impaired mobility and Impaired independence with ADLs and I/ADLs 2/2 left hip dislocation  - PT/OT 5-7 days per week 3 hours per day to maximize safety and independence with functional  mobility and self-care  - Start PT, working on bed mobility, transfers, balance, endurance, strength, gait, eval for appropriate assistive device  - Start OT, working on functional cognition, functional mobility, upper limb strength/coordination, balance, endurance, eval for appropriate adaptive equipment, ADLs, and I/ADLs.  - Goal Mod I for mobility/transfers and basic ADLs     #Left hip dislocation  -difficult to reduce, required 2 OR attempts under general. FU Dr. Nicole in 3 weeks.  -Hip precautions  -Pain control with tylenol 975mg q6hprn, oxycodone 5mg q6hprn, tramadol 50mg q6hprn     #CV: A-fib/a flutter, , hyperlipidemia, hypertension,   - cont home atorvastatin, carvedilol, losartan, xarelto 20mg     #DM  - cont home ozempic that patient family is bringing in  - patient reports not taking anything else for DM, consider SSI if warranted - patient refused ssi and regular glucose checks. Carb control diet     #asthma  -cont home Breztri (patient family bringing in), albuterol prn, singulair, spiriva     #Hx of OA and chronic LBP  - cont gabapentin 900mg BID  - norco at home, oxycodone as above     #Hypothyroidism  -Patient reports taking 175mcg daily except none on Sunday and 0.5 tabs on Monday     #Insomnia  -cont home zolpidem 5mg qhs     Bowel/Bladder  -facilitate daily active BM   -continence of bladder per timed void schedule and rehab nursing  -check PVRs and treat appropriately     DVT prophylaxis  -SCDs and ambulation  -Chemoprophylaxis with home xarelto for DVT     FEN  -follow BMP and treat appropriately  -monitor oral intake daily    3/12:  -vitals stable  -walking 100' fww with cga  -pain control with oxycodone and tramadol         Patient seen and discussed with attending physician. I spent 35 minutes in the professional and overall care of this patient.    Jose Jiménez DO, MBA, PGY-2  Physical Medicine and Rehabilitation      I saw and evaluated the patient. I personally obtained the key and  critical portions of the history and physical exam or was physically present for key and critical portions performed by the resident/fellow. I reviewed the resident/fellow's documentation and discussed the patient with the resident/fellow. I agree with the resident/fellow's medical decision making as documented in the note. Clinical updates as noted.  Time spent with the history, physical exam, assessment and plan and coordination of care with the rehab team and resident physician is greater than 35 minutes.      Patient is doing well ambulating mod I. Teaching don/doff brace for safe DC home tomorrow with . She has follow up for Chest CT for chronic cough and pulmonary problem.

## 2024-03-12 NOTE — PROGRESS NOTES
Physical Therapy                 Therapy Communication Note    Patient Name: Maricel Tomlin  MRN: [id number]  Today's Date: 3/12/2024     Discipline: Physical Therapy    Missed Visit Reason: Missed Visit Reason:  (Pt politely refused PT session, reporting continued pain, coughing and fatigue.)    Missed Time: Attempt 1:00

## 2024-03-12 NOTE — PROGRESS NOTES
Physical Therapy                 Therapy Communication Note    Patient Name: Maricel Tomlin  MRN: [id number]  Today's Date: 3/12/2024     Discipline: Physical Therapy    Missed Visit Reason: Missed Visit Reason: Patient refused (Pt politely declined to participate in PT session stating she was up all night coughing, didn't get any sleep and is in a lot of pain; nursing aware.)    Missed Time: Attempt 9:15

## 2024-03-12 NOTE — PROGRESS NOTES
Occupational Therapy    OT Treatment    Patient Name: Maricel Tomlin  MRN: [id number]  Today's Date: 3/12/2024  Time Calculation  Start Time: 1000  Stop Time: 1040  Time Calculation (min): 40 min         Assessment:        Plan:  Treatment Interventions: ADL retraining, Functional transfer training, UE strengthening/ROM, Endurance training, Patient/family training, Equipment evaluation/education, Compensatory technique education, Continued evaluation  OT Frequency: 5 times per week (x6/wk prn)  OT Discharge Recommendations:  (mod I with intermittent assist for hip brace/iadl's)  Equipment Recommended upon Discharge: Wheeled walker, Bedside commode (hip kit)  Treatment Interventions: ADL retraining, Functional transfer training, UE strengthening/ROM, Endurance training, Patient/family training, Equipment evaluation/education, Compensatory technique education, Continued evaluation    Subjective   Previous Visit Info:  OT Last Visit  OT Received On: 03/12/24  General:  General  General Comment: Patient was very anxious at the beginning  of the treatment session regarding taking a shower in unknown environment and taking off the brace. Patient was given emotional support and reported feeling better.  Precautions:  THR precautions: LLE hip abductor brace  Pain  Reported no pain at this time.     Activities of Daily Living: UE Bathing  UE Bathing Level of Assistance: Modified independent    LE Bathing  LE Bathing Level of Assistance: Minimum assistance (to maintain hip precautions)    UE Dressing  UE Dressing Level of Assistance: Independent    LE Dressing  LE Dressing: Yes  LE Dressing Adaptive Equipment: Reacher, Sock aide, Dressing stick  Pants Level of Assistance: Modified independent  Sock Level of Assistance: Modified independent    Toileting  Toileting Level of Assistance: Modified independent     Bed Mobility/Transfers: Toilet Transfers  Toilet Transfer From: Walker  Toilet Transfer Type: To and from  Toilet Transfers:  Modified independence  EDUCATION:  Education  Individual(s) Educated: Patient  Education Provided: Fall precautons (reviewed hip precautions)  Patient/Caregiver Demonstrated Understanding: yes  Plan of Care Discussed and Agreed Upon: yes  Patient Response to Education: Patient/Caregiver Verbalized Understanding of Information    Goals:  Encounter Problems       Encounter Problems (Active)       Bathing       LTG - Patient will utilize adaptive techniques to bathe body sba (Progressing)       Start:  03/08/24    Expected End:  03/13/24               Dressing Upper Extremities       LTG - Patient will complete upper body dressing mod I (Progressing)       Start:  03/08/24    Expected End:  03/13/24               Dressings Lower Extremities       LTG - Patient will dress lower body/hip brace min a (Progressing)       Start:  03/08/24    Expected End:  03/13/24               Functional Mobility       Completes functional mobility via ervinalker mod I (Progressing)       Start:  03/08/24    Expected End:  03/13/24               Grooming       LTG - Patient will complete daily grooming tasks indep (Progressing)       Start:  03/08/24    Expected End:  03/13/24               Instrumental Activities of Daily Living       LTG - Patient will complete simple kitchen access/meal preparation activities supervision via melissa (Progressing)       Start:  03/08/24    Expected End:  03/13/24               OT Transfers       LTG - Patient will transfer to commode mod I (Progressing)       Start:  03/08/24    Expected End:  03/13/24            LTG - Patient will transfer to walk in shower/dme sba (Progressing)       Start:  03/08/24    Expected End:  03/13/24

## 2024-03-12 NOTE — DISCHARGE SUMMARY
Discharge Diagnosis  Hip dislocation, left, sequela    Issues Requiring Follow-Up  Follow up with Dr. Nicole on 3/26/24 @ 11:00    Test Results Pending At Discharge  Pending Labs       No current pending labs.            Hospital Course   Maricel Tomlin is a 73 y.o. female presenting for rehab after left hip dislocation.  She presented to emergency department for evaluation of left hip pain.  Patient states that she had her left hip replaced many years ago and has had a hip dislocation in the past.  Patient states that this feels very similar.  She states that she was leaning over to grab something and felt a pop in the left hip.  Patient denies being able to bear weight on that extremity.  Patient denies numbness or tingling.  Patient denies fall or traumatic injury.  Patient arrived via EMS to ED for further evaluation.     In ED, an x-ray of the hip was obtained and shows a left hip arthroplasty dislocation per radiology review.  An IV was placed and patient was given moderate sedation in ED and multiple attempts were made at reduction.  This was unsuccessful.  Orthopedics was called and Dr. Ferraro came to ED.  Patient was taken to OR for reduction which was again also unsuccessful.  Patient's original hip arthroplasty was done by Dr. Mina and further care was deferred to him.       A second attempt at closed reduction by Dr. Nicole was successful. Patient evaluated by PT/OT who recommended Acute Rehab. Patient admitted to [facility] on 3/7/24.      Patient quickly progressed through therapy goals and her pain was managed without complication. She is going home with an abductor hip brace and hip precautions that she has been educated on.    Pertinent Physical Exam At Time of Discharge  Physical Exam:    General: Resting comfortably in wc, no acute distress. Pleasant, cooperative.    Respiratory: Equal and symmetrical chest rise bilaterally. No respiratory distress. Diffuse rhonchi in all left fields, clear on  right.  Cardiovascular: 2+ radial pulses bilaterally. Warm and well-perfused extremities. RRR.  Abdomen: Soft, non-tender, non-distended.    Skin: No obvious or apparent rashes on exposed skin.    Neuro: Alert and oriented to person, place, and time as evidenced by appropriate conversation. No word-finding difficulties.    MSK: No obvious deformities.  Strength 5/5 for upper and lower extremities, left hip flexion not tested due to brace  Psych: Appropriate mood and affect.    Function: sba to supervision    Home Medications     Medication List      CHANGE how you take these medications     * traMADol 50 mg tablet; Commonly known as: Ultram; Take 1 tablet (50   mg) by mouth every 6 hours if needed for moderate pain (4 - 6).; What   changed: Another medication with the same name was added. Make sure you   understand how and when to take each.   * traMADol 50 mg tablet; Commonly known as: Ultram; Take 1 tablet (50   mg) by mouth every 6 hours if needed for moderate pain (4 - 6) for up to 3   days.; What changed: You were already taking a medication with the same   name, and this prescription was added. Make sure you understand how and   when to take each.  * This list has 2 medication(s) that are the same as other medications   prescribed for you. Read the directions carefully, and ask your doctor or   other care provider to review them with you.     CONTINUE taking these medications     acetaminophen 500 mg tablet; Commonly known as: Tylenol   acetylcysteine 200 mg/mL (20 %) nebulizer solution; Commonly known as:   Mucomyst   Advair -21 mcg/actuation inhaler; Generic drug: fluticasone   propion-salmeteroL   atorvastatin 40 mg tablet; Commonly known as: Lipitor   Atrovent HFA 17 mcg/actuation inhaler; Generic drug: ipratropium   azelastine 137 mcg (0.1 %) nasal spray; Commonly known as: Astelin   benzonatate 200 mg capsule; Commonly known as: Tessalon   Breztri Aerosphere 160-9-4.8 mcg/actuation HFA aerosol  inhaler; Generic   drug: budesonide-glycopyr-formoterol   buPROPion  mg 24 hr tablet; Commonly known as: Wellbutrin XL   carvedilol 12.5 mg tablet; Commonly known as: Coreg   escitalopram 20 mg tablet; Commonly known as: Lexapro   gabapentin 300 mg capsule; Commonly known as: Neurontin   levothyroxine 125 mcg tablet; Commonly known as: Synthroid, Levoxyl   losartan 50 mg tablet; Commonly known as: Cozaar   montelukast 10 mg tablet; Commonly known as: Singulair   pantoprazole 40 mg EC tablet; Commonly known as: ProtoNix   rivaroxaban 20 mg tablet; Commonly known as: Xarelto   semaglutide 2 mg/dose (8 mg/3 mL) pen injector   Ventolin HFA 90 mcg/actuation inhaler; Generic drug: albuterol   zolpidem CR 12.5 mg ER tablet; Commonly known as: Ambien CR     STOP taking these medications     oxyCODONE 5 mg immediate release tablet; Commonly known as: Roxicodone       Outpatient Follow-Up  Future Appointments   Date Time Provider Department Center   3/26/2024 11:00 AM Wesly Nicole MD [id number] Mario Jiménez DO

## 2024-03-13 VITALS
HEIGHT: 65 IN | BODY MASS INDEX: 43.73 KG/M2 | OXYGEN SATURATION: 94 % | RESPIRATION RATE: 18 BRPM | DIASTOLIC BLOOD PRESSURE: 61 MMHG | TEMPERATURE: 97.5 F | WEIGHT: 262.46 LBS | SYSTOLIC BLOOD PRESSURE: 113 MMHG | HEART RATE: 88 BPM

## 2024-03-13 PROCEDURE — 2500000001 HC RX 250 WO HCPCS SELF ADMINISTERED DRUGS (ALT 637 FOR MEDICARE OP)

## 2024-03-13 PROCEDURE — 97530 THERAPEUTIC ACTIVITIES: CPT | Mod: GP,CQ

## 2024-03-13 PROCEDURE — 94640 AIRWAY INHALATION TREATMENT: CPT

## 2024-03-13 RX ADMIN — TIOTROPIUM BROMIDE INHALATION SPRAY 2 PUFF: 3.12 SPRAY, METERED RESPIRATORY (INHALATION) at 06:45

## 2024-03-13 RX ADMIN — LEVOTHYROXINE SODIUM 175 MCG: 150 TABLET ORAL at 06:02

## 2024-03-13 RX ADMIN — FLUTICASONE FUROATE AND VILANTEROL TRIFENATATE 1 PUFF: 100; 25 POWDER RESPIRATORY (INHALATION) at 06:44

## 2024-03-13 RX ADMIN — PANTOPRAZOLE SODIUM 40 MG: 40 TABLET, DELAYED RELEASE ORAL at 06:02

## 2024-03-13 ASSESSMENT — BRIEF INTERVIEW FOR MENTAL STATUS (BIMS)
ASKED TO RECALL BED: YES, NO CUE REQUIRED
ASKED TO RECALL BLUE: YES, NO CUE REQUIRED
ASKED TO RECALL SOCK: YES, NO CUE REQUIRED
INITIAL REPETITION OF BED BLUE SOCK - FIRST ATTEMPT: 3
WHAT YEAR IS IT: CORRECT
WHAT DAY OF THE WEEK IS IT: CORRECT
WHAT MONTH IS IT: ACCURATE WITHIN 5 DAYS
BIMS SUMMARY SCORE: 15

## 2024-03-13 ASSESSMENT — PATIENT HEALTH QUESTIONNAIRE - PHQ9
SUM OF ALL RESPONSES TO PHQ9 QUESTIONS 1 & 2: 0
2. FEELING DOWN, DEPRESSED OR HOPELESS: NOT AT ALL
1. LITTLE INTEREST OR PLEASURE IN DOING THINGS: NOT AT ALL

## 2024-03-13 NOTE — CARE PLAN
Patient met 6/8 LTG from initial eval: Discharge home with  assistance from spouse. No further OT at this time  Problem: Dressing Upper Extremities  Goal: LTG - Patient will complete upper body dressing mod I  Outcome: Met     Problem: Grooming  Goal: LTG - Patient will complete daily grooming tasks indep  Outcome: Met     Problem: Instrumental Activities of Daily Living  Goal: LTG - Patient will complete simple kitchen access/meal preparation activities supervision via wwalker  Outcome: Met     Problem: Functional Mobility  Goal: Completes functional mobility via wwalker mod I  Outcome: Met     Problem: OT Transfers  Goal: LTG - Patient will transfer to commode mod I  Outcome: Met  Goal: LTG - Patient will transfer to walk in shower/dme sba  Outcome: Met

## 2024-03-13 NOTE — PROGRESS NOTES
Physical Therapy    Time in: 7:05  Time out: 7:13    Patient performs sit/stand from wheelchair with Modified indep. Patient performs car transfer with modified indep and  present.

## 2024-03-15 NOTE — CARE PLAN
Pt met 3/4 LTGs established at initial eval. Discharged home with recommendation for intermittent Supervision at walker level with higher level IADLs. Follow up with [facility] PT.     Problem: PT Problem  Goal: LTG - Mod independent bed mobility.  Outcome: Adequate for Discharge  Goal: LTG - Mod independent transfers.  Outcome: Met  Goal: LTG - Pt amb 100 ft with wheeled walker mod independent.  Outcome: Adequate for Discharge  Goal: LTG - Pt will negotiate 5 stairs with bilateral rails and CGA  Outcome: Met  Goal: STG - Bed mobility with CGA  Outcome: Met  Goal: STG - Transfers with CGA  Outcome: Met  Goal: STG - Pt will amb 50 ft x3 with wheeled walker and CGA  Outcome: Met  Goal: STG - Pt will negotiate 3 stairs with bilateral rails and min assist.  Outcome: Met

## 2024-03-20 DIAGNOSIS — S73.005S HIP DISLOCATION, LEFT, SEQUELA: ICD-10-CM

## 2024-04-02 ENCOUNTER — HOSPITAL ENCOUNTER (OUTPATIENT)
Dept: RADIOLOGY | Facility: CLINIC | Age: 74
Discharge: HOME | End: 2024-04-02
Payer: MEDICARE

## 2024-04-02 ENCOUNTER — OFFICE VISIT (OUTPATIENT)
Dept: ORTHOPEDIC SURGERY | Facility: CLINIC | Age: 74
End: 2024-04-02
Payer: MEDICARE

## 2024-04-02 VITALS — HEIGHT: 65 IN | BODY MASS INDEX: 43.71 KG/M2 | WEIGHT: 262.35 LBS

## 2024-04-02 DIAGNOSIS — S73.005S HIP DISLOCATION, LEFT, SEQUELA: ICD-10-CM

## 2024-04-02 DIAGNOSIS — M25.552 LEFT HIP PAIN: Primary | ICD-10-CM

## 2024-04-02 PROCEDURE — 1111F DSCHRG MED/CURRENT MED MERGE: CPT | Performed by: ORTHOPAEDIC SURGERY

## 2024-04-02 PROCEDURE — 99024 POSTOP FOLLOW-UP VISIT: CPT | Performed by: ORTHOPAEDIC SURGERY

## 2024-04-02 PROCEDURE — 1036F TOBACCO NON-USER: CPT | Performed by: ORTHOPAEDIC SURGERY

## 2024-04-02 PROCEDURE — 73502 X-RAY EXAM HIP UNI 2-3 VIEWS: CPT | Mod: LEFT SIDE | Performed by: RADIOLOGY

## 2024-04-02 PROCEDURE — 73502 X-RAY EXAM HIP UNI 2-3 VIEWS: CPT | Mod: LT

## 2024-04-02 PROCEDURE — 1159F MED LIST DOCD IN RCRD: CPT | Performed by: ORTHOPAEDIC SURGERY

## 2024-04-02 PROCEDURE — 4010F ACE/ARB THERAPY RXD/TAKEN: CPT | Performed by: ORTHOPAEDIC SURGERY

## 2024-04-30 ENCOUNTER — OFFICE VISIT (OUTPATIENT)
Dept: ORTHOPEDIC SURGERY | Facility: CLINIC | Age: 74
End: 2024-04-30
Payer: MEDICARE

## 2024-04-30 VITALS — BODY MASS INDEX: 41.78 KG/M2 | HEIGHT: 66 IN | WEIGHT: 260 LBS

## 2024-04-30 DIAGNOSIS — M17.11 ARTHRITIS OF RIGHT KNEE: Primary | ICD-10-CM

## 2024-04-30 PROCEDURE — 1036F TOBACCO NON-USER: CPT | Performed by: ORTHOPAEDIC SURGERY

## 2024-04-30 PROCEDURE — 1160F RVW MEDS BY RX/DR IN RCRD: CPT | Performed by: ORTHOPAEDIC SURGERY

## 2024-04-30 PROCEDURE — 1159F MED LIST DOCD IN RCRD: CPT | Performed by: ORTHOPAEDIC SURGERY

## 2024-04-30 PROCEDURE — 4010F ACE/ARB THERAPY RXD/TAKEN: CPT | Performed by: ORTHOPAEDIC SURGERY

## 2024-04-30 PROCEDURE — 99024 POSTOP FOLLOW-UP VISIT: CPT | Performed by: ORTHOPAEDIC SURGERY

## 2024-05-01 PROCEDURE — 20610 DRAIN/INJ JOINT/BURSA W/O US: CPT | Performed by: ORTHOPAEDIC SURGERY

## 2024-05-01 RX ORDER — TRIAMCINOLONE ACETONIDE 40 MG/ML
40 INJECTION, SUSPENSION INTRA-ARTICULAR; INTRAMUSCULAR
Status: COMPLETED | OUTPATIENT
Start: 2024-05-01 | End: 2024-05-01

## 2024-05-01 RX ORDER — LIDOCAINE HYDROCHLORIDE 10 MG/ML
2 INJECTION INFILTRATION; PERINEURAL
Status: COMPLETED | OUTPATIENT
Start: 2024-05-01 | End: 2024-05-01

## 2024-05-01 RX ADMIN — TRIAMCINOLONE ACETONIDE 40 MG: 40 INJECTION, SUSPENSION INTRA-ARTICULAR; INTRAMUSCULAR at 16:15

## 2024-05-01 RX ADMIN — LIDOCAINE HYDROCHLORIDE 2 ML: 10 INJECTION INFILTRATION; PERINEURAL at 16:15

## 2024-06-11 ENCOUNTER — OFFICE VISIT (OUTPATIENT)
Dept: ORTHOPEDIC SURGERY | Facility: CLINIC | Age: 74
End: 2024-06-11
Payer: MEDICARE

## 2024-06-11 DIAGNOSIS — M25.552 LEFT HIP PAIN: Primary | ICD-10-CM

## 2024-06-11 PROCEDURE — 4010F ACE/ARB THERAPY RXD/TAKEN: CPT | Performed by: ORTHOPAEDIC SURGERY

## 2024-06-11 PROCEDURE — 1036F TOBACCO NON-USER: CPT | Performed by: ORTHOPAEDIC SURGERY

## 2024-06-11 PROCEDURE — 1159F MED LIST DOCD IN RCRD: CPT | Performed by: ORTHOPAEDIC SURGERY

## 2024-06-11 PROCEDURE — 99213 OFFICE O/P EST LOW 20 MIN: CPT | Performed by: ORTHOPAEDIC SURGERY

## 2024-06-11 PROCEDURE — 1160F RVW MEDS BY RX/DR IN RCRD: CPT | Performed by: ORTHOPAEDIC SURGERY

## 2024-06-11 NOTE — PROGRESS NOTES
74-year-old is seen following left total hip arthroplasty closed reduction March 6, 2024.  She is doing better and has no significant pain in the hip.  She been using the brace.  She has lost close to 60 pounds.  She is taking Ozempic.    Pleasant no acute distress.  Walks essentially normal gait.  Hip flexes to 90 degrees.    She is progressing satisfactorily.  Precautions were reviewed and emphasized.  She can discontinue the brace.  Slowly progress activities.  Follow-up based on her symptoms.

## 2024-11-09 ENCOUNTER — APPOINTMENT (OUTPATIENT)
Dept: RADIOLOGY | Facility: HOSPITAL | Age: 74
End: 2024-11-09
Payer: MEDICARE

## 2024-11-09 ENCOUNTER — HOSPITAL ENCOUNTER (INPATIENT)
Facility: HOSPITAL | Age: 74
End: 2024-11-09
Attending: STUDENT IN AN ORGANIZED HEALTH CARE EDUCATION/TRAINING PROGRAM | Admitting: STUDENT IN AN ORGANIZED HEALTH CARE EDUCATION/TRAINING PROGRAM
Payer: MEDICARE
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
## 2024-11-09 DIAGNOSIS — R52 INTRACTABLE PAIN: Primary | ICD-10-CM

## 2024-11-09 DIAGNOSIS — M25.552 PAIN OF LEFT HIP: ICD-10-CM

## 2024-11-09 DIAGNOSIS — S73.005S HIP DISLOCATION, LEFT, SEQUELA: ICD-10-CM

## 2024-11-09 LAB
ALBUMIN SERPL BCP-MCNC: 3.6 G/DL (ref 3.4–5)
ALP SERPL-CCNC: 130 U/L (ref 33–136)
ALT SERPL W P-5'-P-CCNC: 15 U/L (ref 7–45)
ANION GAP SERPL CALC-SCNC: 11 MMOL/L (ref 10–20)
AST SERPL W P-5'-P-CCNC: 12 U/L (ref 9–39)
BASOPHILS # BLD AUTO: 0.03 X10*3/UL (ref 0–0.1)
BASOPHILS NFR BLD AUTO: 0.2 %
BILIRUB SERPL-MCNC: 1.3 MG/DL (ref 0–1.2)
BUN SERPL-MCNC: 20 MG/DL (ref 6–23)
CALCIUM SERPL-MCNC: 9.1 MG/DL (ref 8.6–10.3)
CHLORIDE SERPL-SCNC: 100 MMOL/L (ref 98–107)
CO2 SERPL-SCNC: 27 MMOL/L (ref 21–32)
CREAT SERPL-MCNC: 0.86 MG/DL (ref 0.5–1.05)
EGFRCR SERPLBLD CKD-EPI 2021: 71 ML/MIN/1.73M*2
EOSINOPHIL # BLD AUTO: 0.42 X10*3/UL (ref 0–0.4)
EOSINOPHIL NFR BLD AUTO: 3.1 %
ERYTHROCYTE [DISTWIDTH] IN BLOOD BY AUTOMATED COUNT: 14.6 % (ref 11.5–14.5)
GLUCOSE SERPL-MCNC: 201 MG/DL (ref 74–99)
HCT VFR BLD AUTO: 39.9 % (ref 36–46)
HGB BLD-MCNC: 13 G/DL (ref 12–16)
IMM GRANULOCYTES # BLD AUTO: 0.05 X10*3/UL (ref 0–0.5)
IMM GRANULOCYTES NFR BLD AUTO: 0.4 % (ref 0–0.9)
LYMPHOCYTES # BLD AUTO: 1.89 X10*3/UL (ref 0.8–3)
LYMPHOCYTES NFR BLD AUTO: 14.2 %
MAGNESIUM SERPL-MCNC: 1.56 MG/DL (ref 1.6–2.4)
MCH RBC QN AUTO: 28.3 PG (ref 26–34)
MCHC RBC AUTO-ENTMCNC: 32.6 G/DL (ref 32–36)
MCV RBC AUTO: 87 FL (ref 80–100)
MONOCYTES # BLD AUTO: 0.99 X10*3/UL (ref 0.05–0.8)
MONOCYTES NFR BLD AUTO: 7.4 %
NEUTROPHILS # BLD AUTO: 9.96 X10*3/UL (ref 1.6–5.5)
NEUTROPHILS NFR BLD AUTO: 74.7 %
NRBC BLD-RTO: 0 /100 WBCS (ref 0–0)
PLATELET # BLD AUTO: 241 X10*3/UL (ref 150–450)
POTASSIUM SERPL-SCNC: 4.2 MMOL/L (ref 3.5–5.3)
PROT SERPL-MCNC: 5.9 G/DL (ref 6.4–8.2)
RBC # BLD AUTO: 4.59 X10*6/UL (ref 4–5.2)
SODIUM SERPL-SCNC: 134 MMOL/L (ref 136–145)
WBC # BLD AUTO: 13.3 X10*3/UL (ref 4.4–11.3)

## 2024-11-09 PROCEDURE — 72170 X-RAY EXAM OF PELVIS: CPT | Performed by: RADIOLOGY

## 2024-11-09 PROCEDURE — 72125 CT NECK SPINE W/O DYE: CPT

## 2024-11-09 PROCEDURE — 73552 X-RAY EXAM OF FEMUR 2/>: CPT | Mod: LT

## 2024-11-09 PROCEDURE — 72170 X-RAY EXAM OF PELVIS: CPT

## 2024-11-09 PROCEDURE — 73552 X-RAY EXAM OF FEMUR 2/>: CPT | Performed by: RADIOLOGY

## 2024-11-09 PROCEDURE — 73590 X-RAY EXAM OF LOWER LEG: CPT | Mod: LT

## 2024-11-09 PROCEDURE — 2500000004 HC RX 250 GENERAL PHARMACY W/ HCPCS (ALT 636 FOR OP/ED): Performed by: STUDENT IN AN ORGANIZED HEALTH CARE EDUCATION/TRAINING PROGRAM

## 2024-11-09 PROCEDURE — 90715 TDAP VACCINE 7 YRS/> IM: CPT | Performed by: STUDENT IN AN ORGANIZED HEALTH CARE EDUCATION/TRAINING PROGRAM

## 2024-11-09 PROCEDURE — 70450 CT HEAD/BRAIN W/O DYE: CPT | Performed by: SURGERY

## 2024-11-09 PROCEDURE — 73564 X-RAY EXAM KNEE 4 OR MORE: CPT | Mod: LT

## 2024-11-09 PROCEDURE — 73590 X-RAY EXAM OF LOWER LEG: CPT | Performed by: RADIOLOGY

## 2024-11-09 PROCEDURE — 99285 EMERGENCY DEPT VISIT HI MDM: CPT | Mod: 25

## 2024-11-09 PROCEDURE — 85025 COMPLETE CBC W/AUTO DIFF WBC: CPT | Performed by: STUDENT IN AN ORGANIZED HEALTH CARE EDUCATION/TRAINING PROGRAM

## 2024-11-09 PROCEDURE — 70450 CT HEAD/BRAIN W/O DYE: CPT

## 2024-11-09 PROCEDURE — 36415 COLL VENOUS BLD VENIPUNCTURE: CPT | Performed by: STUDENT IN AN ORGANIZED HEALTH CARE EDUCATION/TRAINING PROGRAM

## 2024-11-09 PROCEDURE — 84460 ALANINE AMINO (ALT) (SGPT): CPT | Performed by: STUDENT IN AN ORGANIZED HEALTH CARE EDUCATION/TRAINING PROGRAM

## 2024-11-09 PROCEDURE — 73564 X-RAY EXAM KNEE 4 OR MORE: CPT | Performed by: RADIOLOGY

## 2024-11-09 PROCEDURE — 71045 X-RAY EXAM CHEST 1 VIEW: CPT

## 2024-11-09 PROCEDURE — 72125 CT NECK SPINE W/O DYE: CPT | Performed by: SURGERY

## 2024-11-09 PROCEDURE — 71045 X-RAY EXAM CHEST 1 VIEW: CPT | Performed by: RADIOLOGY

## 2024-11-09 PROCEDURE — 90471 IMMUNIZATION ADMIN: CPT | Performed by: STUDENT IN AN ORGANIZED HEALTH CARE EDUCATION/TRAINING PROGRAM

## 2024-11-09 PROCEDURE — 83735 ASSAY OF MAGNESIUM: CPT | Performed by: STUDENT IN AN ORGANIZED HEALTH CARE EDUCATION/TRAINING PROGRAM

## 2024-11-09 RX ORDER — ACETAMINOPHEN 325 MG/1
650 TABLET ORAL ONCE
Status: DISCONTINUED | OUTPATIENT
Start: 2024-11-09 | End: 2024-11-10

## 2024-11-09 RX ADMIN — TETANUS TOXOID, REDUCED DIPHTHERIA TOXOID AND ACELLULAR PERTUSSIS VACCINE, ADSORBED 0.5 ML: 5; 2.5; 8; 8; 2.5 SUSPENSION INTRAMUSCULAR at 23:57

## 2024-11-10 ENCOUNTER — APPOINTMENT (OUTPATIENT)
Dept: RADIOLOGY | Facility: HOSPITAL | Age: 74
End: 2024-11-10
Payer: MEDICARE

## 2024-11-10 VITALS
HEART RATE: 97 BPM | RESPIRATION RATE: 18 BRPM | HEIGHT: 66 IN | WEIGHT: 265 LBS | SYSTOLIC BLOOD PRESSURE: 133 MMHG | TEMPERATURE: 97.7 F | BODY MASS INDEX: 42.59 KG/M2 | OXYGEN SATURATION: 90 % | DIASTOLIC BLOOD PRESSURE: 67 MMHG

## 2024-11-10 PROBLEM — R52 INTRACTABLE PAIN: Status: ACTIVE | Noted: 2024-11-10

## 2024-11-10 LAB
APPEARANCE UR: CLEAR
BACTERIA #/AREA URNS AUTO: ABNORMAL /HPF
BILIRUB UR STRIP.AUTO-MCNC: NEGATIVE MG/DL
COLOR UR: ABNORMAL
GLUCOSE BLD MANUAL STRIP-MCNC: 176 MG/DL (ref 74–99)
GLUCOSE UR STRIP.AUTO-MCNC: NORMAL MG/DL
HOLD SPECIMEN: NORMAL
KETONES UR STRIP.AUTO-MCNC: NEGATIVE MG/DL
LEUKOCYTE ESTERASE UR QL STRIP.AUTO: ABNORMAL
NITRITE UR QL STRIP.AUTO: ABNORMAL
PH UR STRIP.AUTO: 5.5 [PH]
PROT UR STRIP.AUTO-MCNC: NEGATIVE MG/DL
RBC # UR STRIP.AUTO: NEGATIVE /UL
RBC #/AREA URNS AUTO: ABNORMAL /HPF
SP GR UR STRIP.AUTO: 1.01
UROBILINOGEN UR STRIP.AUTO-MCNC: NORMAL MG/DL
WBC #/AREA URNS AUTO: ABNORMAL /HPF

## 2024-11-10 PROCEDURE — 2500000002 HC RX 250 W HCPCS SELF ADMINISTERED DRUGS (ALT 637 FOR MEDICARE OP, ALT 636 FOR OP/ED): Performed by: STUDENT IN AN ORGANIZED HEALTH CARE EDUCATION/TRAINING PROGRAM

## 2024-11-10 PROCEDURE — 2500000004 HC RX 250 GENERAL PHARMACY W/ HCPCS (ALT 636 FOR OP/ED): Performed by: STUDENT IN AN ORGANIZED HEALTH CARE EDUCATION/TRAINING PROGRAM

## 2024-11-10 PROCEDURE — 82947 ASSAY GLUCOSE BLOOD QUANT: CPT

## 2024-11-10 PROCEDURE — 87086 URINE CULTURE/COLONY COUNT: CPT | Mod: PARLAB | Performed by: STUDENT IN AN ORGANIZED HEALTH CARE EDUCATION/TRAINING PROGRAM

## 2024-11-10 PROCEDURE — 81001 URINALYSIS AUTO W/SCOPE: CPT | Performed by: STUDENT IN AN ORGANIZED HEALTH CARE EDUCATION/TRAINING PROGRAM

## 2024-11-10 PROCEDURE — 99222 1ST HOSP IP/OBS MODERATE 55: CPT | Performed by: STUDENT IN AN ORGANIZED HEALTH CARE EDUCATION/TRAINING PROGRAM

## 2024-11-10 PROCEDURE — 73700 CT LOWER EXTREMITY W/O DYE: CPT | Mod: LT

## 2024-11-10 PROCEDURE — 1100000001 HC PRIVATE ROOM DAILY

## 2024-11-10 PROCEDURE — 94640 AIRWAY INHALATION TREATMENT: CPT

## 2024-11-10 PROCEDURE — 2500000001 HC RX 250 WO HCPCS SELF ADMINISTERED DRUGS (ALT 637 FOR MEDICARE OP): Performed by: STUDENT IN AN ORGANIZED HEALTH CARE EDUCATION/TRAINING PROGRAM

## 2024-11-10 PROCEDURE — 2500000005 HC RX 250 GENERAL PHARMACY W/O HCPCS: Performed by: STUDENT IN AN ORGANIZED HEALTH CARE EDUCATION/TRAINING PROGRAM

## 2024-11-10 PROCEDURE — 73700 CT LOWER EXTREMITY W/O DYE: CPT | Mod: LEFT SIDE | Performed by: RADIOLOGY

## 2024-11-10 RX ORDER — CEPHALEXIN 500 MG/1
500 CAPSULE ORAL ONCE
Status: COMPLETED | OUTPATIENT
Start: 2024-11-10 | End: 2024-11-10

## 2024-11-10 RX ORDER — ALBUTEROL SULFATE 90 UG/1
2 INHALANT RESPIRATORY (INHALATION) EVERY 2 HOUR PRN
Status: ACTIVE | OUTPATIENT
Start: 2024-11-10

## 2024-11-10 RX ORDER — PANTOPRAZOLE SODIUM 40 MG/1
40 TABLET, DELAYED RELEASE ORAL DAILY
Status: DISPENSED | OUTPATIENT
Start: 2024-11-10

## 2024-11-10 RX ORDER — CARVEDILOL 12.5 MG/1
12.5 TABLET ORAL
Status: DISPENSED | OUTPATIENT
Start: 2024-11-10

## 2024-11-10 RX ORDER — MAGNESIUM SULFATE HEPTAHYDRATE 40 MG/ML
2 INJECTION, SOLUTION INTRAVENOUS ONCE
Status: COMPLETED | OUTPATIENT
Start: 2024-11-10 | End: 2024-11-10

## 2024-11-10 RX ORDER — ATORVASTATIN CALCIUM 40 MG/1
40 TABLET, FILM COATED ORAL NIGHTLY
Status: DISPENSED | OUTPATIENT
Start: 2024-11-10

## 2024-11-10 RX ORDER — POLYETHYLENE GLYCOL 3350 17 G/17G
17 POWDER, FOR SOLUTION ORAL DAILY
Status: DISPENSED | OUTPATIENT
Start: 2024-11-10

## 2024-11-10 RX ORDER — KETOROLAC TROMETHAMINE 30 MG/ML
15 INJECTION, SOLUTION INTRAMUSCULAR; INTRAVENOUS EVERY 6 HOURS PRN
Status: ACTIVE | OUTPATIENT
Start: 2024-11-10 | End: 2024-11-15

## 2024-11-10 RX ORDER — ALBUTEROL SULFATE 90 UG/1
2 INHALANT RESPIRATORY (INHALATION) EVERY 6 HOURS PRN
Status: DISCONTINUED | OUTPATIENT
Start: 2024-11-10 | End: 2024-11-10

## 2024-11-10 RX ORDER — MONTELUKAST SODIUM 10 MG/1
10 TABLET ORAL NIGHTLY
Status: DISPENSED | OUTPATIENT
Start: 2024-11-10

## 2024-11-10 RX ORDER — MORPHINE SULFATE 2 MG/ML
2 INJECTION, SOLUTION INTRAMUSCULAR; INTRAVENOUS EVERY 6 HOURS PRN
Status: DISPENSED | OUTPATIENT
Start: 2024-11-10

## 2024-11-10 RX ORDER — DICLOFENAC SODIUM 10 MG/G
4 GEL TOPICAL 4 TIMES DAILY PRN
Status: DISPENSED | OUTPATIENT
Start: 2024-11-10

## 2024-11-10 RX ORDER — ACETAMINOPHEN 325 MG/1
650 TABLET ORAL EVERY 6 HOURS
Status: DISPENSED | OUTPATIENT
Start: 2024-11-10

## 2024-11-10 RX ORDER — INSULIN LISPRO 100 [IU]/ML
0-5 INJECTION, SOLUTION INTRAVENOUS; SUBCUTANEOUS
Status: DISPENSED | OUTPATIENT
Start: 2024-11-10

## 2024-11-10 RX ORDER — ESCITALOPRAM OXALATE 10 MG/1
20 TABLET ORAL DAILY
Status: DISPENSED | OUTPATIENT
Start: 2024-11-10

## 2024-11-10 RX ORDER — GABAPENTIN 300 MG/1
600 CAPSULE ORAL 3 TIMES DAILY
Status: DISPENSED | OUTPATIENT
Start: 2024-11-10

## 2024-11-10 RX ORDER — OXYCODONE HYDROCHLORIDE 5 MG/1
5 TABLET ORAL ONCE
Status: COMPLETED | OUTPATIENT
Start: 2024-11-10 | End: 2024-11-10

## 2024-11-10 RX ORDER — INSULIN LISPRO 100 [IU]/ML
0-10 INJECTION, SOLUTION INTRAVENOUS; SUBCUTANEOUS
Status: DISCONTINUED | OUTPATIENT
Start: 2024-11-10 | End: 2024-11-10

## 2024-11-10 RX ORDER — BUPROPION HYDROCHLORIDE 150 MG/1
300 TABLET ORAL DAILY
Status: DISPENSED | OUTPATIENT
Start: 2024-11-10

## 2024-11-10 RX ORDER — ZOLPIDEM TARTRATE 5 MG/1
10 TABLET ORAL NIGHTLY PRN
Status: DISPENSED | OUTPATIENT
Start: 2024-11-10

## 2024-11-10 RX ORDER — FLUTICASONE FUROATE AND VILANTEROL 200; 25 UG/1; UG/1
1 POWDER RESPIRATORY (INHALATION)
Status: DISPENSED | OUTPATIENT
Start: 2024-11-10

## 2024-11-10 RX ORDER — LIDOCAINE 560 MG/1
1 PATCH PERCUTANEOUS; TOPICAL; TRANSDERMAL DAILY
Status: DISPENSED | OUTPATIENT
Start: 2024-11-10

## 2024-11-10 RX ORDER — LOSARTAN POTASSIUM 50 MG/1
50 TABLET ORAL DAILY
Status: DISPENSED | OUTPATIENT
Start: 2024-11-10

## 2024-11-10 RX ORDER — LEVOTHYROXINE SODIUM 125 UG/1
125 TABLET ORAL
Status: DISPENSED | OUTPATIENT
Start: 2024-11-10

## 2024-11-10 RX ADMIN — MONTELUKAST 10 MG: 10 TABLET, FILM COATED ORAL at 20:54

## 2024-11-10 RX ADMIN — MAGNESIUM SULFATE HEPTAHYDRATE 2 G: 40 INJECTION, SOLUTION INTRAVENOUS at 09:11

## 2024-11-10 RX ADMIN — LOSARTAN POTASSIUM 50 MG: 50 TABLET, FILM COATED ORAL at 09:11

## 2024-11-10 RX ADMIN — ACETAMINOPHEN 650 MG: 325 TABLET, FILM COATED ORAL at 20:54

## 2024-11-10 RX ADMIN — FLUTICASONE FUROATE AND VILANTEROL TRIFENATATE 1 PUFF: 200; 25 POWDER RESPIRATORY (INHALATION) at 08:04

## 2024-11-10 RX ADMIN — ZOLPIDEM TARTRATE 10 MG: 5 TABLET, COATED ORAL at 20:54

## 2024-11-10 RX ADMIN — GABAPENTIN 600 MG: 300 CAPSULE ORAL at 09:11

## 2024-11-10 RX ADMIN — ACETAMINOPHEN 650 MG: 325 TABLET, FILM COATED ORAL at 09:23

## 2024-11-10 RX ADMIN — ACETAMINOPHEN 650 MG: 325 TABLET, FILM COATED ORAL at 14:38

## 2024-11-10 RX ADMIN — POLYETHYLENE GLYCOL 3350 17 G: 17 POWDER, FOR SOLUTION ORAL at 09:11

## 2024-11-10 RX ADMIN — ESCITALOPRAM OXALATE 20 MG: 10 TABLET ORAL at 09:11

## 2024-11-10 RX ADMIN — BUPROPION HYDROCHLORIDE 300 MG: 150 TABLET, FILM COATED, EXTENDED RELEASE ORAL at 09:11

## 2024-11-10 RX ADMIN — GABAPENTIN 600 MG: 300 CAPSULE ORAL at 14:38

## 2024-11-10 RX ADMIN — CEPHALEXIN 500 MG: 500 CAPSULE ORAL at 04:09

## 2024-11-10 RX ADMIN — GABAPENTIN 600 MG: 300 CAPSULE ORAL at 20:54

## 2024-11-10 RX ADMIN — CARVEDILOL 12.5 MG: 12.5 TABLET, FILM COATED ORAL at 17:02

## 2024-11-10 RX ADMIN — MORPHINE SULFATE 2 MG: 2 INJECTION, SOLUTION INTRAMUSCULAR; INTRAVENOUS at 13:08

## 2024-11-10 RX ADMIN — PANTOPRAZOLE SODIUM 40 MG: 40 TABLET, DELAYED RELEASE ORAL at 09:11

## 2024-11-10 RX ADMIN — ATORVASTATIN CALCIUM 40 MG: 40 TABLET, FILM COATED ORAL at 20:54

## 2024-11-10 RX ADMIN — LIDOCAINE 4% 1 PATCH: 40 PATCH TOPICAL at 09:11

## 2024-11-10 RX ADMIN — CARVEDILOL 12.5 MG: 12.5 TABLET, FILM COATED ORAL at 09:11

## 2024-11-10 RX ADMIN — LEVOTHYROXINE SODIUM 125 MCG: 125 TABLET ORAL at 07:22

## 2024-11-10 RX ADMIN — MORPHINE SULFATE 2 MG: 2 INJECTION, SOLUTION INTRAMUSCULAR; INTRAVENOUS at 07:22

## 2024-11-10 RX ADMIN — RIVAROXABAN 20 MG: 20 TABLET, FILM COATED ORAL at 17:02

## 2024-11-10 RX ADMIN — OXYCODONE HYDROCHLORIDE 5 MG: 5 TABLET ORAL at 01:50

## 2024-11-10 RX ADMIN — MORPHINE SULFATE 2 MG: 2 INJECTION, SOLUTION INTRAMUSCULAR; INTRAVENOUS at 20:54

## 2024-11-10 SDOH — SOCIAL STABILITY: SOCIAL INSECURITY: WITHIN THE LAST YEAR, HAVE YOU BEEN HUMILIATED OR EMOTIONALLY ABUSED IN OTHER WAYS BY YOUR PARTNER OR EX-PARTNER?: NO

## 2024-11-10 SDOH — ECONOMIC STABILITY: HOUSING INSECURITY: AT ANY TIME IN THE PAST 12 MONTHS, WERE YOU HOMELESS OR LIVING IN A SHELTER (INCLUDING NOW)?: NO

## 2024-11-10 SDOH — ECONOMIC STABILITY: INCOME INSECURITY: IN THE PAST 12 MONTHS HAS THE ELECTRIC, GAS, OIL, OR WATER COMPANY THREATENED TO SHUT OFF SERVICES IN YOUR HOME?: NO

## 2024-11-10 SDOH — SOCIAL STABILITY: SOCIAL INSECURITY: DO YOU FEEL UNSAFE GOING BACK TO THE PLACE WHERE YOU ARE LIVING?: NO

## 2024-11-10 SDOH — SOCIAL STABILITY: SOCIAL INSECURITY: HAVE YOU HAD THOUGHTS OF HARMING ANYONE ELSE?: NO

## 2024-11-10 SDOH — ECONOMIC STABILITY: HOUSING INSECURITY: IN THE LAST 12 MONTHS, WAS THERE A TIME WHEN YOU WERE NOT ABLE TO PAY THE MORTGAGE OR RENT ON TIME?: NO

## 2024-11-10 SDOH — ECONOMIC STABILITY: FOOD INSECURITY: WITHIN THE PAST 12 MONTHS, YOU WORRIED THAT YOUR FOOD WOULD RUN OUT BEFORE YOU GOT THE MONEY TO BUY MORE.: NEVER TRUE

## 2024-11-10 SDOH — ECONOMIC STABILITY: FOOD INSECURITY: HOW HARD IS IT FOR YOU TO PAY FOR THE VERY BASICS LIKE FOOD, HOUSING, MEDICAL CARE, AND HEATING?: NOT VERY HARD

## 2024-11-10 SDOH — SOCIAL STABILITY: SOCIAL INSECURITY: DO YOU FEEL ANYONE HAS EXPLOITED OR TAKEN ADVANTAGE OF YOU FINANCIALLY OR OF YOUR PERSONAL PROPERTY?: NO

## 2024-11-10 SDOH — SOCIAL STABILITY: SOCIAL INSECURITY
WITHIN THE LAST YEAR, HAVE YOU BEEN RAPED OR FORCED TO HAVE ANY KIND OF SEXUAL ACTIVITY BY YOUR PARTNER OR EX-PARTNER?: NO

## 2024-11-10 SDOH — SOCIAL STABILITY: SOCIAL INSECURITY: WERE YOU ABLE TO COMPLETE ALL THE BEHAVIORAL HEALTH SCREENINGS?: YES

## 2024-11-10 SDOH — SOCIAL STABILITY: SOCIAL INSECURITY: HAS ANYONE EVER THREATENED TO HURT YOUR FAMILY OR YOUR PETS?: NO

## 2024-11-10 SDOH — SOCIAL STABILITY: SOCIAL INSECURITY: ABUSE: ADULT

## 2024-11-10 SDOH — ECONOMIC STABILITY: FOOD INSECURITY: WITHIN THE PAST 12 MONTHS, THE FOOD YOU BOUGHT JUST DIDN'T LAST AND YOU DIDN'T HAVE MONEY TO GET MORE.: NEVER TRUE

## 2024-11-10 SDOH — ECONOMIC STABILITY: TRANSPORTATION INSECURITY: IN THE PAST 12 MONTHS, HAS LACK OF TRANSPORTATION KEPT YOU FROM MEDICAL APPOINTMENTS OR FROM GETTING MEDICATIONS?: NO

## 2024-11-10 SDOH — SOCIAL STABILITY: SOCIAL INSECURITY: DOES ANYONE TRY TO KEEP YOU FROM HAVING/CONTACTING OTHER FRIENDS OR DOING THINGS OUTSIDE YOUR HOME?: NO

## 2024-11-10 SDOH — SOCIAL STABILITY: SOCIAL INSECURITY: WITHIN THE LAST YEAR, HAVE YOU BEEN AFRAID OF YOUR PARTNER OR EX-PARTNER?: NO

## 2024-11-10 SDOH — SOCIAL STABILITY: SOCIAL INSECURITY: ARE YOU OR HAVE YOU BEEN THREATENED OR ABUSED PHYSICALLY, EMOTIONALLY, OR SEXUALLY BY ANYONE?: NO

## 2024-11-10 SDOH — SOCIAL STABILITY: SOCIAL INSECURITY: HAVE YOU HAD ANY THOUGHTS OF HARMING ANYONE ELSE?: NO

## 2024-11-10 SDOH — ECONOMIC STABILITY: HOUSING INSECURITY: IN THE PAST 12 MONTHS, HOW MANY TIMES HAVE YOU MOVED WHERE YOU WERE LIVING?: 1

## 2024-11-10 SDOH — SOCIAL STABILITY: SOCIAL INSECURITY: ARE THERE ANY APPARENT SIGNS OF INJURIES/BEHAVIORS THAT COULD BE RELATED TO ABUSE/NEGLECT?: NO

## 2024-11-10 ASSESSMENT — LIFESTYLE VARIABLES
HOW OFTEN DO YOU HAVE 6 OR MORE DRINKS ON ONE OCCASION: NEVER
EVER HAD A DRINK FIRST THING IN THE MORNING TO STEADY YOUR NERVES TO GET RID OF A HANGOVER: NO
TOTAL SCORE: 0
HOW OFTEN DO YOU HAVE A DRINK CONTAINING ALCOHOL: NEVER
HOW MANY STANDARD DRINKS CONTAINING ALCOHOL DO YOU HAVE ON A TYPICAL DAY: PATIENT DOES NOT DRINK
AUDIT-C TOTAL SCORE: 0
HAVE PEOPLE ANNOYED YOU BY CRITICIZING YOUR DRINKING: NO
AUDIT-C TOTAL SCORE: 0
SKIP TO QUESTIONS 9-10: 1
EVER FELT BAD OR GUILTY ABOUT YOUR DRINKING: NO
HAVE YOU EVER FELT YOU SHOULD CUT DOWN ON YOUR DRINKING: NO

## 2024-11-10 ASSESSMENT — COGNITIVE AND FUNCTIONAL STATUS - GENERAL
MOVING FROM LYING ON BACK TO SITTING ON SIDE OF FLAT BED WITH BEDRAILS: A LITTLE
STANDING UP FROM CHAIR USING ARMS: A LOT
TURNING FROM BACK TO SIDE WHILE IN FLAT BAD: A LITTLE
MOVING FROM LYING ON BACK TO SITTING ON SIDE OF FLAT BED WITH BEDRAILS: A LITTLE
DRESSING REGULAR LOWER BODY CLOTHING: A LITTLE
TOILETING: A LITTLE
CLIMB 3 TO 5 STEPS WITH RAILING: A LOT
DRESSING REGULAR UPPER BODY CLOTHING: A LITTLE
HELP NEEDED FOR BATHING: A LITTLE
MOBILITY SCORE: 14
TOILETING: A LITTLE
MOBILITY SCORE: 14
TURNING FROM BACK TO SIDE WHILE IN FLAT BAD: A LITTLE
DAILY ACTIVITIY SCORE: 20
STANDING UP FROM CHAIR USING ARMS: A LOT
DAILY ACTIVITIY SCORE: 20
PATIENT BASELINE BEDBOUND: NO
MOVING TO AND FROM BED TO CHAIR: A LOT
HELP NEEDED FOR BATHING: A LITTLE
DRESSING REGULAR UPPER BODY CLOTHING: A LITTLE
WALKING IN HOSPITAL ROOM: A LOT
WALKING IN HOSPITAL ROOM: A LOT
DRESSING REGULAR LOWER BODY CLOTHING: A LITTLE
CLIMB 3 TO 5 STEPS WITH RAILING: A LOT
MOVING TO AND FROM BED TO CHAIR: A LOT

## 2024-11-10 ASSESSMENT — ACTIVITIES OF DAILY LIVING (ADL)
GROOMING: NEEDS ASSISTANCE
TOILETING: NEEDS ASSISTANCE
BATHING: NEEDS ASSISTANCE
DRESSING YOURSELF: NEEDS ASSISTANCE
LACK_OF_TRANSPORTATION: NO
FEEDING YOURSELF: INDEPENDENT
LACK_OF_TRANSPORTATION: NO
JUDGMENT_ADEQUATE_SAFELY_COMPLETE_DAILY_ACTIVITIES: YES
PATIENT'S MEMORY ADEQUATE TO SAFELY COMPLETE DAILY ACTIVITIES?: YES
HEARING - LEFT EAR: FUNCTIONAL
WALKS IN HOME: NEEDS ASSISTANCE
ADEQUATE_TO_COMPLETE_ADL: YES
HEARING - RIGHT EAR: FUNCTIONAL

## 2024-11-10 ASSESSMENT — PAIN - FUNCTIONAL ASSESSMENT
PAIN_FUNCTIONAL_ASSESSMENT: 0-10

## 2024-11-10 ASSESSMENT — PAIN SCALES - GENERAL
PAINLEVEL_OUTOF10: 3
PAINLEVEL_OUTOF10: 8
PAINLEVEL_OUTOF10: 8
PAINLEVEL_OUTOF10: 9
PAINLEVEL_OUTOF10: 4
PAINLEVEL_OUTOF10: 9
PAINLEVEL_OUTOF10: 7

## 2024-11-10 ASSESSMENT — PAIN DESCRIPTION - ORIENTATION
ORIENTATION: LEFT
ORIENTATION: LEFT

## 2024-11-10 ASSESSMENT — PATIENT HEALTH QUESTIONNAIRE - PHQ9
2. FEELING DOWN, DEPRESSED OR HOPELESS: SEVERAL DAYS
SUM OF ALL RESPONSES TO PHQ9 QUESTIONS 1 & 2: 2
1. LITTLE INTEREST OR PLEASURE IN DOING THINGS: SEVERAL DAYS

## 2024-11-10 ASSESSMENT — PAIN DESCRIPTION - PROGRESSION: CLINICAL_PROGRESSION: NOT CHANGED

## 2024-11-10 ASSESSMENT — PAIN DESCRIPTION - LOCATION
LOCATION: LEG
LOCATION: LEG

## 2024-11-10 ASSESSMENT — PAIN DESCRIPTION - PAIN TYPE: TYPE: ACUTE PAIN

## 2024-11-10 NOTE — PROGRESS NOTES
"Maricel Tomlin is a 74 y.o. female on day 0 of admission presenting with Intractable pain.        Subjective   Resting with pain better controlled.  No changes at this time.  Added ortho consult.         Objective     Last Recorded Vitals  BP (!) 185/73   Pulse 99   Temp 36.5 °C (97.7 °F) (Temporal)   Resp 18   Ht 1.676 m (5' 6\")   Wt 120 kg (265 lb)   SpO2 94%   BMI 42.77 kg/m²     Intake/Output last 3 Shifts:  No intake/output data recorded.      =========    RELEVANT RESULTS      ==========    Labs  Lab Results   Component Value Date    WBC 13.3 (H) 11/09/2024    HGB 13.0 11/09/2024    HCT 39.9 11/09/2024    MCV 87 11/09/2024     11/09/2024     Lab Results   Component Value Date    GLUCOSE 201 (H) 11/09/2024    CALCIUM 9.1 11/09/2024     (L) 11/09/2024    K 4.2 11/09/2024    CO2 27 11/09/2024     11/09/2024    BUN 20 11/09/2024    CREATININE 0.86 11/09/2024      Lab Results   Component Value Date    ALT 15 11/09/2024    AST 12 11/09/2024    ALKPHOS 130 11/09/2024    BILITOT 1.3 (H) 11/09/2024          Exam     GENERAL:   no distress, alert and cooperative  HEENT: Normal Inspection, Mucous membranes moist, No JVD, No Lymphadenopathy  CARDIOVASCULAR: RRR, no murmurs, 2+ equal pulses of the extremities, normal S1 and S 2  RESPIRATORY: Patent airways, CTAB, thorax symmetric, No significant wheezing, Rales or Rhonchi  ABDOMEN: Soft, Non-Tender, Normal Bowel Sounds, No Distention  SKIN: Warm and dry, no lesions, no rashes  EXTREMITIES: normal extremities, no significant cyanosis edema, contusions or wounds, no obsvious clubbing  NEURO: A&O x 3, CN II-XII grossly intact  PSYCH: Appropriate mood and behavior        =======     SCHEDULED MEDICATIONS     =======  Scheduled medications   Medication Dose Route Frequency    acetaminophen  650 mg oral q6h    atorvastatin  40 mg oral Nightly    buPROPion XL  300 mg oral Daily    carvedilol  12.5 mg oral BID    escitalopram  20 mg oral Daily    tiotropium  " 2 puff inhalation Daily    And    fluticasone furoate-vilanteroL  1 puff inhalation Daily    gabapentin  600 mg oral TID    insulin lispro  0-5 Units subcutaneous TID    levothyroxine  125 mcg oral Daily before breakfast    lidocaine  1 patch transdermal Daily    losartan  50 mg oral Daily    magnesium sulfate  2 g intravenous Once    montelukast  10 mg oral Nightly    pantoprazole  40 mg oral Daily    polyethylene glycol  17 g oral Daily    rivaroxaban  20 mg oral Daily with evening meal       ==========     PRN MEDICATIONS      =========  albuterol, 2 puff, q2h PRN  diclofenac sodium, 4 g, 4x daily PRN  ketorolac, 15 mg, q6h PRN  morphine, 2 mg, q6h PRN  zolpidem, 10 mg, Nightly PRN        ==============     DIET     ==============  Dietary Orders (From admission, onward)       Start     Ordered    11/10/24 0812  May Participate in Room Service  ( ROOM SERVICE MAY PARTICIPATE)  Once        Question:  .  Answer:  Yes    11/10/24 0811    11/10/24 0602  Adult diet Regular  Diet effective now        Question:  Diet type  Answer:  Regular    11/10/24 0602                    ======    Assessment/Plan      =======    ASSESSMENT:  Principal Problem:    Intractable pain    ___________________________________________________    Intractable left hip pain  Left trochanteric bursitis  Ambulatory dysfx  Asymptomatic bacturia  Obesity  H/o renal cell CA s/p left nephrectomy  pAfib on Xarelto  CKD IIIa  DMII  HTN  DLP  Hypothyroidism  ELLIE  GERD        PLAN:    Multimodal pain control initiated on admit.   PT/OT  Vitals stable.   Orthopedic surgery consult                   Gordo Hanna DO

## 2024-11-10 NOTE — CARE PLAN
The patient's goals for the shift include  safety and comfort     The clinical goals for the shift include Pt will remain safe and comfortable during the shift

## 2024-11-10 NOTE — ED PROVIDER NOTES
EMERGENCY DEPARTMENT ENCOUNTER      Pt Name: Maricel Tomlin  MRN: [id number]  Birthdate 1950  Date of evaluation: 11/9/2024  Provider: Juventino Allred DO    CHIEF COMPLAINT       Chief Complaint   Patient presents with    Fall     Pt states she had a fall 11/08/24 landing on left hip causing pain and weakness to left leg. Had 2 falls today, again landing on left hip.        HISTORY OF PRESENT ILLNESS    Maricel Tomlin is a 74 y.o. female who presents to the emergency department with EMS for multiple falls at home.  Patient states that she had a fall on 11/8 for which she subsequently landed on the left hip since then she has been having pain as well as weakness throughout the left lower extremity localized to the hip itself.  She has had multiple falls since then secondary to the weakness.  She is uncertain if she is hit her head or not during the multiple falls.  She denies any loss of consciousness.  She is on anticoagulation for history of atrial fibrillation.  Denies any further associated injuries at this time.  Endorses prior hip replacement years ago.          Nursing Notes were reviewed.    REVIEW OF SYSTEMS     CONSTITUTIONAL: Denies fever, sweats, chills.   NEURO: Denies numbness, weakness, tingling, headache.   HEENT: Denies sore throat, rhinorrhea, changes in vision.   CARDIO: Denies chest pain, palpitations.  PULM: Denies shortness of breath, cough.   GI: Denies abdominal pain, nausea, vomiting, diarrhea, constipation, melena, hematochezia.  : Denies painful urination, frequency, hematuria.   MSK: Endorses falls with hip pain.  SKIN: Denies rash, lesions.   ENDOCRINE: Denies unexpected weight-loss.   HEME: Denies bleeding disorder.     PAST MEDICAL HISTORY     Past Medical History:   Diagnosis Date    Arthritis     Asthma     Cancer (Multi)     Diabetes mellitus (Multi)     Disease of thyroid gland     Hypertension     Other conditions influencing health status     Right handed    Personal history of  malignant neoplasm, unspecified     History of malignant neoplasm    Personal history of other diseases of the circulatory system     History of hypertension    Personal history of other diseases of the musculoskeletal system and connective tissue     History of arthritis    Personal history of other diseases of the respiratory system     History of sinus problem    Personal history of other endocrine, nutritional and metabolic disease     History of diabetes mellitus    Personal history of other endocrine, nutritional and metabolic disease     History of thyroid disease    Personal history of other malignant neoplasm of kidney     History of renal cell carcinoma    Personal history of other mental and behavioral disorders     History of depression    Personal history of other specified conditions     History of bruising easily       SURGICAL HISTORY       Past Surgical History:   Procedure Laterality Date    MR HEAD ANGIO WO IV CONTRAST  9/15/2020    MR HEAD ANGIO WO IV CONTRAST 9/15/2020 [facility]ICAL [facility]GAC[facility]    MR NECK ANGIO WO IV CONTRAST  9/15/2020    MR NECK ANGIO WO IV CONTRAST 9/15/2020 [facility] CLINICAL LEGACY    OTHER SURGICAL HISTORY  11/30/2020    Hip replacement    OTHER SURGICAL HISTORY  11/30/2020    Knee replacement    OTHER SURGICAL HISTORY  11/30/2020    Nephrectomy partial    TOTAL HIP ARTHROPLASTY  05/01/2018    Hip Replacement Right       ALLERGIES     Metformin and Sulfa (sulfonamide antibiotics)    FAMILY HISTORY     No family history on file.     SOCIAL HISTORY       Social History     Socioeconomic History    Marital status:    Tobacco Use    Smoking status: Never     Passive exposure: Never    Smokeless tobacco: Never   Substance and Sexual Activity    Alcohol use: Not Currently     Comment: rare occasion    Drug use: Never    Sexual activity: Defer     Social Drivers of Health     Financial Resource Strain: Low Risk  (3/8/2024)    Overall Financial Resource Strain (CARDIA)     Difficulty  of Paying Living Expenses: Not hard at all   Food Insecurity: No Food Insecurity (3/8/2024)    Hunger Vital Sign     Worried About Running Out of Food in the Last Year: Never true     Ran Out of Food in the Last Year: Never true   Transportation Needs: No Transportation Needs (3/8/2024)    PRAPARE - Transportation     Lack of Transportation (Medical): No     Lack of Transportation (Non-Medical): No   Physical Activity: Insufficiently Active (3/8/2024)    Exercise Vital Sign     Days of Exercise per Week: 1 day     Minutes of Exercise per Session: 120 min   Stress: No Stress Concern Present (3/8/2024)    [employer] [place] [employer] - Occupational Stress Questionnaire     Feeling of Stress : Not at all   Social Connections: Moderately Integrated (3/8/2024)    Social Connection and Isolation Panel [NHANES]     Frequency of Communication with Friends and Family: More than three times a week     Frequency of Social Gatherings with Friends and Family: Once a week     Attends Mandaen Services: More than 4 times per year     Active Member of Clubs or Organizations: No     Attends Club or Organization Meetings: Never     Marital Status:    Intimate Partner Violence: Not At Risk (3/8/2024)    Humiliation, Afraid, Rape, and Kick questionnaire     Fear of Current or Ex-Partner: No     Emotionally Abused: No     Physically Abused: No     Sexually Abused: No   Housing Stability: Low Risk  (3/8/2024)    Housing Stability Vital Sign     Unable to Pay for Housing in the Last Year: No     Number of Places Lived in the Last Year: 1     Unstable Housing in the Last Year: No       PHYSICAL EXAM   VS: As documented in the triage note from today's date and EMR flowsheet were reviewed.  Gen: Well developed. No acute distress. Seated in bed. Appears nontoxic.  GCS 15  Skin: Warm. Dry.  Small abrasion/puncture wound to the left lateral leg hemostatic. No rashes or lesions.  Eyes: Pupils equally round and reactive to  light. Clear sclera. EOMI.  HENT: Atraumatic appearance. Mucosal membranes moist. No oral lesions, uvula midline, airway patent.  TMs clear bilaterally nares clear bilaterally no evidence of basilar skull fracture no midline cervical tenderness or step-off.  Trachea is midline.  CV: Regular rate and regular rhythm. S1, S2. No pedal edema. Warm extremities.  Resp: Nonlabored breathing Clear to auscultation bilaterally. No increased work of breathing.   GI: Soft and nontender. No rebound or guarding. Bowel sounds x4 present.   MSK: Symmetric muscle bulk. No joint swelling in the extremities. Compartments are soft. Neurovascularly intact x4 extremities. Radial pulses +2 equal bilaterally.  Pedal pulses +2 equal bilaterally.  No T or L-spine tenderness no step-offs.  Pelvis is stable.  No pain with logroll of the thigh.  Neuro: Alert. CN II - XII intact. Speech fluent. Moving all extremities. No focal deficits.   Psych: Appropriate. Kempt.    DIAGNOSTIC RESULTS   RADIOLOGY:   Non-plain film images such as CT, Ultrasound and MRI are read by the radiologist. Plain radiographic images are visualized and preliminarily interpreted by the emergency physician with the below findings: X-ray imaging of the hip shows no evidence of fracture on x-ray imaging.      Interpretation per the Radiologist below, if available at the time of this note:    XR chest 1 view   Final Result   No acute cardiopulmonary process.        MACRO:   None        Signed by: Xavier Sidhu 11/10/2024 12:02 AM   Dictation workstation:   [id number]      XR knee left 4+ views   Final Result   No acute fracture.        Chronic and postsurgical changes as described above. Generalized   diffuse osteopenia.        MACRO:   None        Signed by: Xavier Sidhu 11/10/2024 12:07 AM   Dictation workstation:   [id number]      XR tibia fibula left 2 views   Final Result   No acute fracture.        Chronic and postsurgical changes as described above. Generalized    diffuse osteopenia.        MACRO:   None        Signed by: Xavier Sidhu 11/10/2024 12:07 AM   Dictation workstation:   [id number]      XR femur left 2+ views   Final Result   No acute fracture.        Chronic and postsurgical changes as described above. Generalized   diffuse osteopenia.        MACRO:   None        Signed by: Xavier Sidhu 11/10/2024 12:07 AM   Dictation workstation:   [id number]      XR pelvis 1-2 views   Final Result   No acute fracture.        Chronic and postsurgical changes as described above. Generalized   diffuse osteopenia.        MACRO:   None        Signed by: Xavier Sidhu 11/10/2024 12:07 AM   Dictation workstation:   [id number]      CT head wo IV contrast   Final Result   No evidence of acute intracranial abnormality.        No acute cervical spine fracture or malalignment.             MACRO:   None        Signed by: Abel Alas 11/9/2024 11:43 PM   Dictation workstation:   [id number]      CT cervical spine wo IV contrast   Final Result   No evidence of acute intracranial abnormality.        No acute cervical spine fracture or malalignment.             MACRO:   None        Signed by: Abel Alas 11/9/2024 11:43 PM   Dictation workstation:   [id number]            ED BEDSIDE ULTRASOUND:   Performed by ED Physician - none    LABS:  Labs Reviewed   CBC WITH AUTO DIFFERENTIAL - Abnormal       Result Value    WBC 13.3 (*)     nRBC 0.0      RBC 4.59      Hemoglobin 13.0      Hematocrit 39.9      MCV 87      MCH 28.3      MCHC 32.6      RDW 14.6 (*)     Platelets 241      Neutrophils % 74.7      Immature Granulocytes %, Automated 0.4      Lymphocytes % 14.2      Monocytes % 7.4      Eosinophils % 3.1      Basophils % 0.2      Neutrophils Absolute 9.96 (*)     Immature Granulocytes Absolute, Automated 0.05      Lymphocytes Absolute 1.89      Monocytes Absolute 0.99 (*)     Eosinophils Absolute 0.42 (*)     Basophils Absolute 0.03     COMPREHENSIVE METABOLIC PANEL - Abnormal     "Glucose 201 (*)     Sodium 134 (*)     Potassium 4.2      Chloride 100      Bicarbonate 27      Anion Gap 11      Urea Nitrogen 20      Creatinine 0.86      eGFR 71      Calcium 9.1      Albumin 3.6      Alkaline Phosphatase 130      Total Protein 5.9 (*)     AST 12      Bilirubin, Total 1.3 (*)     ALT 15     MAGNESIUM - Abnormal    Magnesium 1.56 (*)    URINALYSIS WITH REFLEX CULTURE AND MICROSCOPIC - Abnormal    Color, Urine Light-Yellow      Appearance, Urine Clear      Specific Gravity, Urine 1.008      pH, Urine 5.5      Protein, Urine NEGATIVE      Glucose, Urine Normal      Blood, Urine NEGATIVE      Ketones, Urine NEGATIVE      Bilirubin, Urine NEGATIVE      Urobilinogen, Urine Normal      Nitrite, Urine 1+ (*)     Leukocyte Esterase, Urine 25 Chris/µL (*)    MICROSCOPIC ONLY, URINE - Abnormal    WBC, Urine 1-5      RBC, Urine 1-2      Bacteria, Urine 2+ (*)    URINE CULTURE   URINALYSIS WITH REFLEX CULTURE AND MICROSCOPIC    Narrative:     The following orders were created for panel order Urinalysis with Reflex Culture and Microscopic.  Procedure                               Abnormality         Status                     ---------                               -----------         ------                     Urinalysis with Reflex C...[[id number]]  Abnormal            Final result               Extra Urine Gray Tube[[id number]]                            In process                   Please view results for these tests on the individual orders.   EXTRA URINE GRAY TUBE       All other labs were within normal range or not returned as of this dictation.    EMERGENCY DEPARTMENT COURSE/MDM:   Vitals:    Vitals:    11/10/24 0002 11/10/24 0023 11/10/24 0153 11/10/24 0230   BP:   114/78    Pulse:   (!) 102 99   Resp:   18    Temp:  37.1 °C (98.8 °F)     SpO2: 94%  97% 94%   Weight:  120 kg (265 lb)     Height:  1.676 m (5' 6\")         I reviewed the patient's triage vitals and they are patient is hemodynamically stable " was initially mildly tachycardic although this did resolve.    Due to the above findings the following was ordered CT imaging of the head and neck as well as plain films of the left lower extremity and basic labs.    Lab work reveals no significant electrolyte derangements renal function is appropriate mild elevation in total bilirubin recommend close follow-up.  Patient does have a mild leukocytosis no infectious etiology on clinical examination noted.  No signs of anemia.  X-ray imaging of the left lower extremity is negative for fracture or dislocation.  CT imaging of the head and neck shows no evidence of intracranial bleed or fracture.  As patient is still nonambulatory we will pursue CT imaging of the hip to rule out occult fracture.  Patient is declining Tylenol for pain.    Patient was signed out to the oncoming physician to follow-up on CT imaging and disposition.    Diagnoses as of 11/10/24 0315   Fall, initial encounter   Pain of left hip       Patient was counseled regarding labs, imaging, likely diagnosis, and plan. All questions were answered.     ------------------------------------------------------------------  Information provided by the patient and EMS  Past medical history complicating workup atrial fibrillation on anticoagulation  Previous medical records reviewed office visit 10/16/2024  ------------------------------------------------------------------  ED Medications administered this visit:    Medications   acetaminophen (Tylenol) tablet 650 mg (650 mg oral Not Given 11/10/24 0045)   diphth,pertus(acell),tetanus (BoostRIX) 2.5-8-5 Lf-mcg-Lf/0.5mL vaccine 0.5 mL (0.5 mL intramuscular Given 11/9/24 0198)   oxyCODONE (Roxicodone) immediate release tablet 5 mg (5 mg oral Given 11/10/24 0150)        Final Impression:   1. Fall, initial encounter    2. Pain of left hip          Juventino Allred DO    (Please note that portions of this note were completed with a voice recognition program.   Efforts were made to edit the dictations but occasionally words are mis-transcribed.)     Juventino Allred DO  11/10/24 8003

## 2024-11-10 NOTE — CONSULTS
Reason For Consult  LEFT HIP PAIN    History Of Present Illness  Maricel Tomlin is a 74 y.o. female presenting with MULTIPLE FALLS AND LEFT HIP PAIN.  FELL ON DECK AT HOME 3 DAYS AGO.. WAS ABLE TO GET UP WITHOUT PROBLEMS.  PAIN INCREASED YESTERDAY AND FELL 3 TIMES.  BROUGHT IN BY EMS.  NORMALLY AMBULATES WITHOUT ASSIST.  S/P LEFT THR 10 YEARS AGO DR DIXON AND HAD DISLOCATION REDUCED THIS PAST SPRING.  C/O LAT PAIN     Past Medical History  She has a past medical history of Arthritis, Asthma, Cancer (Multi), Diabetes mellitus (Multi), Disease of thyroid gland, Hypertension, Other conditions influencing health status, Personal history of malignant neoplasm, unspecified, Personal history of other diseases of the circulatory system, Personal history of other diseases of the musculoskeletal system and connective tissue, Personal history of other diseases of the respiratory system, Personal history of other endocrine, nutritional and metabolic disease, Personal history of other endocrine, nutritional and metabolic disease, Personal history of other malignant neoplasm of kidney, Personal history of other mental and behavioral disorders, and Personal history of other specified conditions.    She has no past medical history of CHF (congestive heart failure).    Surgical History  She has a past surgical history that includes Total hip arthroplasty (05/01/2018); Other surgical history (11/30/2020); Other surgical history (11/30/2020); Other surgical history (11/30/2020); MR angio neck wo IV contrast (9/15/2020); and MR angio head wo IV contrast (9/15/2020).     Social History  She reports that she has never smoked. She has never been exposed to tobacco smoke. She has never used smokeless tobacco. She reports that she does not currently use alcohol. She reports that she does not use drugs.    Family History  No family history on file.     Allergies  Metformin and Sulfa (sulfonamide antibiotics)    Review of Systems       Physical  "Exam  CAN ACTIVELY RAISE LEFT LEG.  ,MINIMAL PAIN WITH ROM LEFT HIP.  TENDER TROCHANTERIC REGION.  MILD BRUISE. NEUROVASCULAR IS  INTACT.'     Last Recorded Vitals  Blood pressure (!) 185/73, pulse 99, temperature 36.5 °C (97.7 °F), temperature source Temporal, resp. rate 18, height 1.676 m (5' 6\"), weight 120 kg (265 lb), SpO2 94%.    Relevant Results  XRAYS/CT NEG FOR FX AND HIP PROSTHESIS IS IN GOOD POSITION     Assessment/Plan     IMP- LEFT HIP CONTUSION    PLAN - MOBILIZE as SUNI.  WOULD CONSIDER STEROID INJECTION IF IT PERSISTS    I NOTIFIED DR DIXON    THANK YOU      Gustavo Doll MD    "

## 2024-11-10 NOTE — H&P
Subjective   Maricel Tomlin is a 74 y.o. female who presents for Fall (Pt states she had a fall 11/08/24 landing on left hip causing pain and weakness to left leg. Had 2 falls today, again landing on left hip. ).    HPI  This is a 74-year-old female who initially presented to [facility] on 11/9/2024 with severe left hip pain s/p falls x3 over the last 24 hours.  She reports walking out on her deck when she slipped and fell, landing on both knees.  Her left knee took the brunt of her fall.  She has since had difficulty bearing weight on her LLE and as a result has had 2 additional falls before EMS was called.    CT head and C-spine negative.  CT of her left hip was negative for fracture but did show 3.8 x 3.5 x 10.3 cm fluid collection adjacent to greater trochanter.  XRs of left knee and tib-fib otherwise negative.    PMH:  Class III obesity  Hx renal cell carcinoma s/p left partial nephrectomy  Paroxysmal A-fib, on Xarelto  Mild intermittent asthma  CKD IIIa  DM2  Hypertension  Hyperlipidemia  Hypothyroidism  Hepatic steatosis  ELLIE  GERD  Osteoarthritis of right shoulder, spine with lumbar radiculopathy  Degenerative disc disease  Depression  Insomnia    PSH: Lumbar laminectomy, colonoscopy, left partial nephrectomy, bilateral total hip arthroplasty, left knee replacement, PVI ablation, tonsillectomy and adenoidectomy  FH: RA, CKD, asthma, dementia, A-fib, CVA  SH: Denies tobacco, alcohol, or illicit drug use. , lives at home with . Ambulates independently.    Review of Systems:  12-point ROS was reviewed and is negative, unless otherwise noted in HPI    Objective   Vitals:    11/10/24 0530   BP: 153/73   Pulse: 74   Resp:    Temp:    SpO2: (!) 93%       Physical Exam:   Constitutional: obese, awake, alert, no acute distress  ENMT: mucous membranes moist, conjunctivae clear  Head/Neck: normocephalic, atraumatic; supple, trachea midline  Respiratory/Thorax: patent airways, CTAB; no wheezes, rales, or  rhonchi  Cardiovascular: RRR, no murmur appreciated  Gastrointestinal: soft, nondistended, non-tender, bowel sounds appreciated  Extremities: palpable peripheral pulses, left hip and left knee with restricted flexion due to pain, tender over left hip  Neurological: AO x3, no focal deficits  Psychological: appropriate mood and behavior  Skin: warm and dry, scattered ecchymoses across BLE and right chest    Scheduled Medications:   acetaminophen, 650 mg, oral, q6h  atorvastatin, 40 mg, oral, Nightly  buPROPion XL, 300 mg, oral, Daily  carvedilol, 12.5 mg, oral, BID  escitalopram, 20 mg, oral, Daily  tiotropium, 2 puff, inhalation, Daily   And  fluticasone furoate-vilanteroL, 1 puff, inhalation, Daily  gabapentin, 600 mg, oral, TID  insulin lispro, 0-10 Units, subcutaneous, TID  levothyroxine, 125 mcg, oral, Daily before breakfast  lidocaine, 1 patch, transdermal, Daily  losartan, 50 mg, oral, Daily  montelukast, 10 mg, oral, Nightly  pantoprazole, 40 mg, oral, Daily  polyethylene glycol, 17 g, oral, Daily  rivaroxaban, 20 mg, oral, Daily with evening meal       Continuous Medications:       PRN Medications:   PRN medications: albuterol, diclofenac sodium, ketorolac, morphine, zolpidem    Assessment/Plan     Intractable left hip pain  Left trochanteric bursitis  Ambulatory dysfunction with recurrent falls  Leukocytosis, suspect reactive  Asymptomatic bacteruria  Hypomagnesemia    Class III obesity  Hx renal cell carcinoma s/p left partial nephrectomy  Paroxysmal A-fib, on Xarelto  Mild intermittent asthma  CKD IIIa  DM2  Hypertension  Hyperlipidemia  Hypothyroidism  Hepatic steatosis  ELLIE  GERD  Osteoarthritis of right shoulder, spine with lumbar radiculopathy  Degenerative disc disease  Depression  Insomnia    CT L hip negative for fx but does show trochanteric bursitis. Presently complains of 9/10 L hip pain despite being given oxycodone in the ED. Will have multimodal pain control with sched Tylenol and Lidocaine  patch + PRN IV Toradol and Morphine. Transition to PO PRNs once pain is under better control. PT/OT. Pt agreeable to rehab if needed.  Will defer any further antibiotics as pt denies any urinary sxs. While she does have mild leukocytosis, suspect that this is reactive. Otherwise afebrile.  Monitor and replete electrolytes PRN.    DVT PPX: Xarelto  Code status: DNR-CCA - Confirmed with pt.    Herberth Conley DO  [facility]

## 2024-11-11 LAB
ANION GAP SERPL CALC-SCNC: 13 MMOL/L (ref 10–20)
BUN SERPL-MCNC: 13 MG/DL (ref 6–23)
CALCIUM SERPL-MCNC: 9.7 MG/DL (ref 8.6–10.3)
CHLORIDE SERPL-SCNC: 99 MMOL/L (ref 98–107)
CO2 SERPL-SCNC: 28 MMOL/L (ref 21–32)
CREAT SERPL-MCNC: 0.76 MG/DL (ref 0.5–1.05)
EGFRCR SERPLBLD CKD-EPI 2021: 82 ML/MIN/1.73M*2
ERYTHROCYTE [DISTWIDTH] IN BLOOD BY AUTOMATED COUNT: 14.7 % (ref 11.5–14.5)
GLUCOSE SERPL-MCNC: 252 MG/DL (ref 74–99)
HCT VFR BLD AUTO: 44 % (ref 36–46)
HGB BLD-MCNC: 14.1 G/DL (ref 12–16)
MCH RBC QN AUTO: 28.1 PG (ref 26–34)
MCHC RBC AUTO-ENTMCNC: 32 G/DL (ref 32–36)
MCV RBC AUTO: 88 FL (ref 80–100)
NRBC BLD-RTO: 0 /100 WBCS (ref 0–0)
PLATELET # BLD AUTO: 264 X10*3/UL (ref 150–450)
POTASSIUM SERPL-SCNC: 4.6 MMOL/L (ref 3.5–5.3)
RBC # BLD AUTO: 5.02 X10*6/UL (ref 4–5.2)
SODIUM SERPL-SCNC: 135 MMOL/L (ref 136–145)
WBC # BLD AUTO: 10 X10*3/UL (ref 4.4–11.3)

## 2024-11-11 PROCEDURE — 2500000001 HC RX 250 WO HCPCS SELF ADMINISTERED DRUGS (ALT 637 FOR MEDICARE OP): Performed by: STUDENT IN AN ORGANIZED HEALTH CARE EDUCATION/TRAINING PROGRAM

## 2024-11-11 PROCEDURE — 94640 AIRWAY INHALATION TREATMENT: CPT

## 2024-11-11 PROCEDURE — 2500000004 HC RX 250 GENERAL PHARMACY W/ HCPCS (ALT 636 FOR OP/ED): Performed by: STUDENT IN AN ORGANIZED HEALTH CARE EDUCATION/TRAINING PROGRAM

## 2024-11-11 PROCEDURE — 85027 COMPLETE CBC AUTOMATED: CPT | Performed by: INTERNAL MEDICINE

## 2024-11-11 PROCEDURE — 2500000002 HC RX 250 W HCPCS SELF ADMINISTERED DRUGS (ALT 637 FOR MEDICARE OP, ALT 636 FOR OP/ED): Performed by: STUDENT IN AN ORGANIZED HEALTH CARE EDUCATION/TRAINING PROGRAM

## 2024-11-11 PROCEDURE — 36415 COLL VENOUS BLD VENIPUNCTURE: CPT | Performed by: INTERNAL MEDICINE

## 2024-11-11 PROCEDURE — 97165 OT EVAL LOW COMPLEX 30 MIN: CPT | Mod: GO

## 2024-11-11 PROCEDURE — 1100000001 HC PRIVATE ROOM DAILY

## 2024-11-11 PROCEDURE — 99232 SBSQ HOSP IP/OBS MODERATE 35: CPT | Performed by: INTERNAL MEDICINE

## 2024-11-11 PROCEDURE — 97161 PT EVAL LOW COMPLEX 20 MIN: CPT | Mod: GP

## 2024-11-11 PROCEDURE — 84132 ASSAY OF SERUM POTASSIUM: CPT | Performed by: INTERNAL MEDICINE

## 2024-11-11 RX ADMIN — BUPROPION HYDROCHLORIDE 300 MG: 150 TABLET, FILM COATED, EXTENDED RELEASE ORAL at 09:38

## 2024-11-11 RX ADMIN — CARVEDILOL 12.5 MG: 12.5 TABLET, FILM COATED ORAL at 09:38

## 2024-11-11 RX ADMIN — RIVAROXABAN 20 MG: 20 TABLET, FILM COATED ORAL at 17:48

## 2024-11-11 RX ADMIN — ATORVASTATIN CALCIUM 40 MG: 40 TABLET, FILM COATED ORAL at 21:14

## 2024-11-11 RX ADMIN — GABAPENTIN 600 MG: 300 CAPSULE ORAL at 09:38

## 2024-11-11 RX ADMIN — GABAPENTIN 600 MG: 300 CAPSULE ORAL at 15:35

## 2024-11-11 RX ADMIN — MORPHINE SULFATE 2 MG: 2 INJECTION, SOLUTION INTRAMUSCULAR; INTRAVENOUS at 09:39

## 2024-11-11 RX ADMIN — ACETAMINOPHEN 650 MG: 325 TABLET, FILM COATED ORAL at 02:25

## 2024-11-11 RX ADMIN — ESCITALOPRAM OXALATE 20 MG: 10 TABLET ORAL at 09:38

## 2024-11-11 RX ADMIN — ACETAMINOPHEN 650 MG: 325 TABLET, FILM COATED ORAL at 21:12

## 2024-11-11 RX ADMIN — ZOLPIDEM TARTRATE 10 MG: 5 TABLET, COATED ORAL at 21:14

## 2024-11-11 RX ADMIN — ACETAMINOPHEN 650 MG: 325 TABLET, FILM COATED ORAL at 15:35

## 2024-11-11 RX ADMIN — LOSARTAN POTASSIUM 50 MG: 50 TABLET, FILM COATED ORAL at 09:38

## 2024-11-11 RX ADMIN — GABAPENTIN 600 MG: 300 CAPSULE ORAL at 21:12

## 2024-11-11 RX ADMIN — PANTOPRAZOLE SODIUM 40 MG: 40 TABLET, DELAYED RELEASE ORAL at 09:38

## 2024-11-11 RX ADMIN — CARVEDILOL 12.5 MG: 12.5 TABLET, FILM COATED ORAL at 17:48

## 2024-11-11 RX ADMIN — FLUTICASONE FUROATE AND VILANTEROL TRIFENATATE 1 PUFF: 200; 25 POWDER RESPIRATORY (INHALATION) at 07:06

## 2024-11-11 RX ADMIN — LEVOTHYROXINE SODIUM 125 MCG: 125 TABLET ORAL at 06:31

## 2024-11-11 RX ADMIN — MONTELUKAST 10 MG: 10 TABLET, FILM COATED ORAL at 21:13

## 2024-11-11 RX ADMIN — ACETAMINOPHEN 650 MG: 325 TABLET, FILM COATED ORAL at 09:38

## 2024-11-11 ASSESSMENT — COGNITIVE AND FUNCTIONAL STATUS - GENERAL
MOBILITY SCORE: 24
DAILY ACTIVITIY SCORE: 24

## 2024-11-11 ASSESSMENT — PAIN SCALES - GENERAL
PAINLEVEL_OUTOF10: 8
PAINLEVEL_OUTOF10: 2
PAINLEVEL_OUTOF10: 5 - MODERATE PAIN

## 2024-11-11 ASSESSMENT — PAIN - FUNCTIONAL ASSESSMENT: PAIN_FUNCTIONAL_ASSESSMENT: 0-10

## 2024-11-11 NOTE — CARE PLAN
The patient's goals for the shift include      The clinical goals for the shift include pt will remain safe throughout the shift      Problem: Pain - Adult  Goal: Verbalizes/displays adequate comfort level or baseline comfort level  Outcome: Progressing     Problem: Safety - Adult  Goal: Free from fall injury  Outcome: Progressing     Problem: Discharge Planning  Goal: Discharge to home or other facility with appropriate resources  Outcome: Progressing     Problem: Chronic Conditions and Co-morbidities  Goal: Patient's chronic conditions and co-morbidity symptoms are monitored and maintained or improved  Outcome: Progressing     Problem: Fall/Injury  Goal: Not fall by end of shift  Outcome: Progressing  Goal: Be free from injury by end of the shift  Outcome: Progressing  Goal: Verbalize understanding of personal risk factors for fall in the hospital  Outcome: Progressing  Goal: Verbalize understanding of risk factor reduction measures to prevent injury from fall in the home  Outcome: Progressing  Goal: Use assistive devices by end of the shift  Outcome: Progressing  Goal: Pace activities to prevent fatigue by end of the shift  Outcome: Progressing        Yes

## 2024-11-11 NOTE — PROGRESS NOTES
11/11/24 1404   Discharge Planning   Living Arrangements Spouse/significant other   Support Systems Spouse/significant other   Assistance Needed independent   Type of Residence Private residence   Number of Stairs to Enter Residence 5   Number of Stairs Within Residence   (basement)   Do you have animals or pets at home? Yes   Type of Animals or Pets cat   Home or Post Acute Services None   Expected Discharge Disposition Home   Does the patient need discharge transport arranged? No     Met with patient at bedside. Introduced self and role as care coordinator. Demographics verified. Patient PCP is Tobias. Patient insurance is medicare and [employer]. Patient does not use and assistive devices to ambulate. Patient is currently declining any home going needs at this time.

## 2024-11-11 NOTE — PROGRESS NOTES
Occupational Therapy    Evaluation    Patient Name: Maricel Tomlin  MRN: [id number]  Today's Date: 11/11/2024  Time Calculation  Start Time: 1100  Stop Time: 1117  Time Calculation (min): 17 min  731/731-A    Assessment  IP OT Assessment  OT Assessment: This hospitalization 11/9/2024 due to 3 falls prior to this admit; sustaining intractable left hip pain, left trochanteric bursitis, ambulatory dysfunction.  Per ortho notes:  mobilize as tolerated; impression left hip contusion.  Discharge patient from OT since at baseline function in ADL's and functional transfers/mobility:  indep.  [place] agreed no further services needed.  End of Session Communication: Bedside nurse  End of Session Patient Position: Up in chair, Alarm on call-light within reach    Plan:  No Skilled OT: At baseline function  OT Discharge Recommendations: No further acute OT, No OT needed after discharge  OT - OK to Discharge: Yes (to next level of care when medically cleared by physician/medical team)    Subjective   Current Problem:  1. Intractable pain        2. Fall, initial encounter        3. Pain of left hip          General:  General  Reason for Referral: ADL, safety assessment  Referred By: Herberth Conley DO  Past Medical History Relevant to Rehab: 1. Class III obesity  2. Hx renal cell carcinoma s/p left partial nephrectomy  3. Paroxysmal A-fib, on Xarelto  4. Mild intermittent asthma  5. CKD IIIa  6. DM2  7. Hypertension  8. Hyperlipidemia  9. Hypothyroidism  10. Hepatic steatosis  11. ELLIE  12. GERD  13. Osteoarthritis of right shoulder, spine with lumbar radiculopathy  14. Degenerative disc disease  15. Depression  16. Insomnia     PSH: Lumbar laminectomy, colonoscopy, left partial nephrectomy, bilateral total hip arthroplasty, left knee replacement, PVI ablation  Co-Treatment: PT (co-eval to maximize safety during mobility)  Prior to Session Communication: Bedside nurse who confirmed that patient is medically stable to participate in this OT  session  Patient Position Received: Bed, 2 rail up, Alarm off, not on at start of session  General Comment: Patient seen bedside; cooperative, motivated    Pain:  Pain Assessment  Pain Assessment: 0-10  0-10 (Numeric) Pain Score: 5 - Moderate pain  Pain Type: Acute pain  Pain Location: Hip  Pain Orientation: Left  Pain Interventions: Repositioned (per patient:  received pain meds from nursing)    Objective   Cognition:  Overall Cognitive Status: Within Functional Limits  Orientation Level: Oriented X4     Home Living:  Type of Home: House  Lives With: Spouse  Home Adaptive Equipment: Walker rolling or standard  Home Layout: One level  Home Access: Stairs to enter with rails (5)  Bathroom Shower/Tub: Walk-in shower  Bathroom Toilet:  (higher built)  Bathroom Equipment: Grab bars in shower, Hand-held shower hose (seat)  Home Living Comments: sleeps in regular bed with rails; has 2 recliners     Prior Function:  Level of [place]: Independent with ADLs and functional transfers (shares cooking;  does laudry; hired cleaning every other week)  Ambulatory Assistance: Independent (use of wheeled walker as needed; scooter for further distances)  Hand Dominance: Right  Prior Function Comments: drives, shops    ADL:  LE Dressing Assistance: Independent  LE Dressing Deficit: Don/doff R sock, Don/doff L sock  ADL Comments: at baseline function for ADL's    Bed Mobility/Transfers:   Bed Mobility  Bed Mobility: Yes  Bed Mobility 1  Bed Mobility 1: Supine to sitting, Sitting to supine  Level of Assistance 1: Independent  Bed Mobility Comments 1: HOB elevated  Transfers  Transfer: Yes  Transfer 1  Transfer From 1: Sit to, Stand to  Transfer to 1: Bed, Chair with arms  Transfer Level of Assistance 1: Independent    Ambulation/Gait Training:  Functional Mobility  Functional Mobility Performed: Yes  Functional Mobility 1  Device 1: Rolling walker  Assistance 1: Independent    Sitting Balance:  Static Sitting Balance  Static  Sitting-Level of Assistance: Independent    Standing Balance:  Static Standing Balance  Static Standing-Level of Assistance: Independent    Sensation:  Light Touch: No apparent deficits    Extremities: RUE   RUE : Within Functional Limits (grossly observed during functional tasks) and LUE   LUE: Within Functional Limits (grossly observed during functional tasks)    Outcome Measures: [facility] Daily Activity  Putting on and taking off regular lower body clothing: None  Bathing (including washing, rinsing, drying): None  Putting on and taking off regular upper body clothing: None  Toileting, which includes using toilet, bedpan or urinal: None  Taking care of personal grooming such as brushing teeth: None  Eating Meals: None  Daily Activity - Total Score: 24

## 2024-11-11 NOTE — PROGRESS NOTES
"Maricel Tomlin is a 74 y.o. female on day 1 of admission presenting with Intractable pain.        Subjective   Patient remained hemodynamically stable at this time.   PT/OT pending.         Objective     Last Recorded Vitals  /73 (BP Location: Right arm, Patient Position: Lying)   Pulse 104   Temp 36.3 °C (97.3 °F) (Temporal)   Resp 18   Ht 1.676 m (5' 6\")   Wt 120 kg (265 lb)   SpO2 93%   BMI 42.77 kg/m²     Intake/Output last 3 Shifts:  I/O last 3 completed shifts:  In: 54.2 (0.5 mL/kg) [I.V.:54.2 (0.5 mL/kg)]  Out: 1650 (13.7 mL/kg) [Urine:1650 (0.4 mL/kg/hr)]  Weight: 120.2 kg       =========    RELEVANT RESULTS      ==========    Labs  Lab Results   Component Value Date    WBC 10.0 11/11/2024    HGB 14.1 11/11/2024    HCT 44.0 11/11/2024    MCV 88 11/11/2024     11/11/2024     Lab Results   Component Value Date    GLUCOSE 252 (H) 11/11/2024    CALCIUM 9.7 11/11/2024     (L) 11/11/2024    K 4.6 11/11/2024    CO2 28 11/11/2024    CL 99 11/11/2024    BUN 13 11/11/2024    CREATININE 0.76 11/11/2024      Lab Results   Component Value Date    ALT 15 11/09/2024    AST 12 11/09/2024    ALKPHOS 130 11/09/2024    BILITOT 1.3 (H) 11/09/2024          Exam     GENERAL:   no distress, alert and cooperative  HEENT: Normal Inspection, Mucous membranes moist, No JVD, No Lymphadenopathy  CARDIOVASCULAR: RRR, no murmurs, 2+ equal pulses of the extremities, normal S1 and S 2  RESPIRATORY: Patent airways, CTAB, thorax symmetric, No significant wheezing, Rales or Rhonchi  ABDOMEN: Soft, Non-Tender, Normal Bowel Sounds, No Distention  SKIN: Warm and dry, no lesions, no rashes  EXTREMITIES: normal extremities, no significant cyanosis edema, contusions or wounds, no obsvious clubbing  NEURO: A&O x 3, CN II-XII grossly intact  PSYCH: Appropriate mood and behavior        =======     SCHEDULED MEDICATIONS     =======  Scheduled medications   Medication Dose Route Frequency    acetaminophen  650 mg oral q6h    " atorvastatin  40 mg oral Nightly    buPROPion XL  300 mg oral Daily    carvedilol  12.5 mg oral BID    escitalopram  20 mg oral Daily    tiotropium  2 puff inhalation Daily    And    fluticasone furoate-vilanteroL  1 puff inhalation Daily    gabapentin  600 mg oral TID    insulin lispro  0-5 Units subcutaneous TID    levothyroxine  125 mcg oral Daily before breakfast    lidocaine  1 patch transdermal Daily    losartan  50 mg oral Daily    montelukast  10 mg oral Nightly    pantoprazole  40 mg oral Daily    polyethylene glycol  17 g oral Daily    rivaroxaban  20 mg oral Daily with evening meal       ==========     PRN MEDICATIONS      =========  albuterol, 2 puff, q2h PRN  diclofenac sodium, 4 g, 4x daily PRN  ketorolac, 15 mg, q6h PRN  morphine, 2 mg, q6h PRN  zolpidem, 10 mg, Nightly PRN        ==============     DIET     ==============  Dietary Orders (From admission, onward)       Start     Ordered    11/10/24 0812  May Participate in Room Service  ( ROOM SERVICE MAY PARTICIPATE)  Once        Question:  .  Answer:  Yes    11/10/24 0811    11/10/24 0602  Adult diet Regular  Diet effective now        Question:  Diet type  Answer:  Regular    11/10/24 0602                    ======    Assessment/Plan      =======    ASSESSMENT:  Principal Problem:    Intractable pain    ___________________________________________________    Intractable left hip pain  Left trochanteric bursitis  Ambulatory dysfx  Asymptomatic bacturia  Obesity  H/o renal cell CA s/p left nephrectomy  pAfib on Xarelto  CKD IIIa  DMII  HTN  DLP  Hypothyroidism  ELLIE  GERD      PLAN:    Multimodal pain control initiated on admit.   PT/OT  Vitals stable.   Orthopedic surgery consulted with no intervention needed      DVT Prophylaxis  xarelto    SUMMARY/DISPOSITION  PT/OT Pending for DC Planning.                 Gordo Hanna DO

## 2024-11-11 NOTE — PROGRESS NOTES
Physical Therapy    Physical Therapy Evaluation    Patient Name: Maricel Tomlin  MRN: [id number]  Today's Date: 11/11/2024   Time Calculation  Start Time: 1101  Stop Time: 1116  Time Calculation (min): 15 min  731/731-A    Assessment/Plan   PT Assessment  Evaluation/Treatment Tolerance: Patient tolerated treatment well  Medical Staff Made Aware: Yes  Strengths: Ability to acquire knowledge, Attitude of self  End of Session Communication: Bedside nurse  Assessment Comment: Pt tolerated session well. Pt did not require any physical assistance for activities. Pt does not need acute PT at this time.  End of Session Patient Position: Up in chair, Alarm on (call light in reach)  IP OR SWING BED PT PLAN  Inpatient or Swing Bed: Inpatient  PT Plan  Treatment/Interventions: Bed mobility, Transfer training, Gait training  PT Plan: PT Eval only  PT Eval Only Reason: No acute PT needs identified  PT Frequency: PT eval only  PT Discharge Recommendations: No further acute PT  PT Recommended Transfer Status: Independent  PT - OK to Discharge: Yes (once medically cleared)    Subjective     Current Problem:  1. Intractable pain        2. Fall, initial encounter        3. Pain of left hip          Patient Active Problem List   Diagnosis    HTN (hypertension)    Hyperlipidemia    Atrial fibrillation (Multi)    Diabetes mellitus, type 2 (Multi)    Hypothyroidism    Asthma    Chronic obstructive pulmonary disease (Multi)    Hip dislocation, left, sequela    Morbid obesity due to excess calories (Multi)    Intractable pain       General Visit Information:  General  Reason for Referral: PT eval and treat  Referred By: Cyndi  Past Medical History Relevant to Rehab: arthritis, asthma, cancer, ELLIE, obesity, afib, GERD, depression, DM, CKD, HTN, HLD, L knee replacement, R and L total hip replacements, lumbar laminectomy  Co-Treatment: OT  Co-Treatment Reason: to maximize pt safety and mobility  Prior to Session Communication: Bedside  nurse  Patient Position Received: Bed, 2 rail up, Alarm off, not on at start of session    Home Living:  Home Living  Home Living Comments: lives with . 5 L[address]PE[address] with rails bilat. first floor set up. pt has elevated toliet, WIS, GBs, chair, and HHS. pt owns w/w, rollator, and scooter.    Prior Level of Function:  Prior Function Per Pt/Caregiver Report  Level of [place]: Independent with ADLs and functional transfers (shares cooking and laundry with . laundry in basement. cleaning person comes every other week. amb in home without device. uses scooter in community for long distances. retired nurse. drives/shops. sleeps in regular bed with side rails.)    Precautions:  Precautions  Precautions Comment: falls, OOB with assist, DNR, hermelinda       Objective     Pain:  Pain Assessment  0-10 (Numeric) Pain Score:  (5/10 L hip pain, stated RN administered pain meds, repositioned EOS for pain relief)    Cognition:  Cognition  Overall Cognitive Status: Within Functional Limits    General Assessments:       Sensation: chronic numbness in bilateral hands and feet. L > R for both hands and feet       Strength  Strength Comments: BLE strength and ROM WFL                   Functional Assessments:     Bed Mobility  Bed Mobility: Yes  Bed Mobility 1  Bed Mobility 1: Supine to sitting, Sitting to supine  Level of Assistance 1: Independent    Transfers  Transfer: Yes  Transfer 1  Transfer From 1: Sit to, Stand to  Transfer to 1: Bed  Transfer Level of Assistance 1: Independent  Trials/Comments 1: using w/w    Ambulation/Gait Training  Ambulation/Gait Training Performed: Yes  Ambulation/Gait Training 1  Surface 1: Level tile  Device 1: Rolling walker  Assistance 1: Independent  Comments/Distance (ft) 1: 10ft               Outcome Measures:     [facility] Basic Mobility  Turning from your back to your side while in a flat bed without using bedrails: None  Moving from lying on your back to sitting on the side of a flat bed  without using bedrails: None  Moving to and from bed to chair (including a wheelchair): None  Standing up from a chair using your arms (e.g. wheelchair or bedside chair): None  To walk in hospital room: None  Climbing 3-5 steps with railing: None  Basic Mobility - Total Score: 24             Education Documentation  Mobility Training, taught by Kayla Rowland PT at 11/11/2024 11:31 AM.  Learner: Patient  Readiness: Acceptance  Method: Explanation  Response: Verbalizes Understanding    Education Comments  No comments found.

## 2024-11-11 NOTE — PROGRESS NOTES
C/O LEFT HIP PAIN    CAN ACTIVELY RAISE.  TENDER LAT AND NV INTACT    IMP- LEFTHIP CONTUSION    PLAN -PT as SUNI    WILL SEE PRN INHOSPITAL

## 2024-11-12 VITALS
HEIGHT: 66 IN | TEMPERATURE: 97.2 F | SYSTOLIC BLOOD PRESSURE: 122 MMHG | HEART RATE: 94 BPM | BODY MASS INDEX: 42.59 KG/M2 | WEIGHT: 265 LBS | RESPIRATION RATE: 18 BRPM | DIASTOLIC BLOOD PRESSURE: 84 MMHG | OXYGEN SATURATION: 94 %

## 2024-11-12 LAB — BACTERIA UR CULT: ABNORMAL

## 2024-11-12 PROCEDURE — 94640 AIRWAY INHALATION TREATMENT: CPT

## 2024-11-12 PROCEDURE — 99238 HOSP IP/OBS DSCHRG MGMT 30/<: CPT | Performed by: INTERNAL MEDICINE

## 2024-11-12 PROCEDURE — 2500000002 HC RX 250 W HCPCS SELF ADMINISTERED DRUGS (ALT 637 FOR MEDICARE OP, ALT 636 FOR OP/ED): Performed by: STUDENT IN AN ORGANIZED HEALTH CARE EDUCATION/TRAINING PROGRAM

## 2024-11-12 PROCEDURE — 2500000001 HC RX 250 WO HCPCS SELF ADMINISTERED DRUGS (ALT 637 FOR MEDICARE OP): Performed by: STUDENT IN AN ORGANIZED HEALTH CARE EDUCATION/TRAINING PROGRAM

## 2024-11-12 RX ORDER — OXYCODONE HYDROCHLORIDE 5 MG/1
5 TABLET ORAL EVERY 6 HOURS PRN
Qty: 12 TABLET | Refills: 0 | Status: SHIPPED | OUTPATIENT
Start: 2024-11-12 | End: 2024-11-15

## 2024-11-12 RX ADMIN — FLUTICASONE FUROATE AND VILANTEROL TRIFENATATE 1 PUFF: 200; 25 POWDER RESPIRATORY (INHALATION) at 06:47

## 2024-11-12 RX ADMIN — PANTOPRAZOLE SODIUM 40 MG: 40 TABLET, DELAYED RELEASE ORAL at 08:46

## 2024-11-12 RX ADMIN — BUPROPION HYDROCHLORIDE 300 MG: 150 TABLET, FILM COATED, EXTENDED RELEASE ORAL at 08:45

## 2024-11-12 RX ADMIN — ACETAMINOPHEN 650 MG: 325 TABLET, FILM COATED ORAL at 02:27

## 2024-11-12 RX ADMIN — LOSARTAN POTASSIUM 50 MG: 50 TABLET, FILM COATED ORAL at 08:46

## 2024-11-12 RX ADMIN — CARVEDILOL 12.5 MG: 12.5 TABLET, FILM COATED ORAL at 08:48

## 2024-11-12 RX ADMIN — ESCITALOPRAM OXALATE 20 MG: 10 TABLET ORAL at 08:47

## 2024-11-12 RX ADMIN — ACETAMINOPHEN 650 MG: 325 TABLET, FILM COATED ORAL at 08:45

## 2024-11-12 RX ADMIN — LEVOTHYROXINE SODIUM 125 MCG: 125 TABLET ORAL at 07:04

## 2024-11-12 RX ADMIN — GABAPENTIN 600 MG: 300 CAPSULE ORAL at 08:48

## 2024-11-12 ASSESSMENT — PAIN SCALES - GENERAL
PAINLEVEL_OUTOF10: 0 - NO PAIN
PAINLEVEL_OUTOF10: 0 - NO PAIN

## 2024-11-12 NOTE — CARE PLAN
The patient's goals for the shift include      The clinical goals for the shift include pt will remain safe and comftorable      Problem: Pain - Adult  Goal: Verbalizes/displays adequate comfort level or baseline comfort level  Outcome: Progressing     Problem: Safety - Adult  Goal: Free from fall injury  Outcome: Progressing     Problem: Discharge Planning  Goal: Discharge to home or other facility with appropriate resources  Outcome: Progressing     Problem: Chronic Conditions and Co-morbidities  Goal: Patient's chronic conditions and co-morbidity symptoms are monitored and maintained or improved  Outcome: Progressing     Problem: Fall/Injury  Goal: Not fall by end of shift  Outcome: Progressing  Goal: Be free from injury by end of the shift  Outcome: Progressing  Goal: Verbalize understanding of personal risk factors for fall in the hospital  Outcome: Progressing  Goal: Verbalize understanding of risk factor reduction measures to prevent injury from fall in the home  Outcome: Progressing  Goal: Use assistive devices by end of the shift  Outcome: Progressing  Goal: Pace activities to prevent fatigue by end of the shift  Outcome: Progressing

## 2024-11-12 NOTE — DISCHARGE SUMMARY
Discharge Diagnosis  Intractable pain    Issues Requiring Follow-Up  See PCP in one week.    Discharge Meds     Medication List      START taking these medications     oxyCODONE 5 mg immediate release tablet; Commonly known as: Roxicodone;   Take 1 tablet (5 mg) by mouth every 6 hours if needed for severe pain (7 -   10) for up to 3 days.     CONTINUE taking these medications     acetylcysteine 200 mg/mL (20 %) nebulizer solution; Commonly known as:   Mucomyst   Advair -21 mcg/actuation inhaler; Generic drug: fluticasone   propion-salmeteroL   atorvastatin 40 mg tablet; Commonly known as: Lipitor   azelastine 137 mcg (0.1 %) nasal spray; Commonly known as: Astelin   Breztri Aerosphere 160-9-4.8 mcg/actuation HFA aerosol inhaler; Generic   drug: budesonide-glycopyr-formoterol   buPROPion  mg 24 hr tablet; Commonly known as: Wellbutrin XL   carvedilol 12.5 mg tablet; Commonly known as: Coreg   escitalopram 20 mg tablet; Commonly known as: Lexapro   gabapentin 300 mg capsule; Commonly known as: Neurontin   levothyroxine 125 mcg tablet; Commonly known as: Synthroid, Levoxyl   losartan 50 mg tablet; Commonly known as: Cozaar   montelukast 10 mg tablet; Commonly known as: Singulair   pantoprazole 40 mg EC tablet; Commonly known as: ProtoNix   rivaroxaban 20 mg tablet; Commonly known as: Xarelto   semaglutide 2 mg/dose (8 mg/3 mL) pen injector   Ventolin HFA 90 mcg/actuation inhaler; Generic drug: albuterol   zolpidem CR 12.5 mg ER tablet; Commonly known as: Ambien CR     STOP taking these medications     acetaminophen 500 mg tablet; Commonly known as: Tylenol   Atrovent HFA 17 mcg/actuation inhaler; Generic drug: ipratropium   benzonatate 200 mg capsule; Commonly known as: Tessalon   traMADol 50 mg tablet; Commonly known as: Ultram       Test Results Pending At Discharge  Pending Labs       Order Current Status    Urine Culture Preliminary result            Hospital Course     74 year old female presented  with fall.  No fracutres.  Ortho evaluated and recommended weight bearing as tolerated, and non-op mgmt. Will see in office to consider steroid injections if pain persists. Patient stable and pain improved and stable for dc home.  Sent 3 days of oxy for pain control.     Pertinent Physical Exam At Time of Discharge  Physical Exam  Vitals reviewed.   Constitutional:       Appearance: Normal appearance.   HENT:      Head: Normocephalic and atraumatic.      Mouth/Throat:      Mouth: Mucous membranes are moist.   Eyes:      Conjunctiva/sclera: Conjunctivae normal.   Cardiovascular:      Rate and Rhythm: Normal rate.      Pulses: Normal pulses.   Pulmonary:      Effort: Pulmonary effort is normal.      Breath sounds: Normal breath sounds. No wheezing.   Abdominal:      General: Abdomen is flat. There is no distension.      Palpations: Abdomen is soft.      Tenderness: There is no guarding.   Musculoskeletal:         General: No swelling. Normal range of motion.   Skin:     General: Skin is warm and dry.   Neurological:      General: No focal deficit present.      Mental Status: She is alert. Mental status is at baseline.   Psychiatric:         Mood and Affect: Mood normal.         Behavior: Behavior normal.         Outpatient Follow-Up  No future appointments.      Vivek Alvarez MD

## 2025-05-15 NOTE — PROGRESS NOTES
"Occupational Therapy    OT Treatment    Patient Name: Maricel Tomlin  MRN: [id number]  Today's Date: 3/8/2024  Time Calculation  Start Time: 0915  Stop Time: 1000  Time Calculation (min): 45 min         Assessment:  Prognosis: Good  End of Session Patient Position: Up in chair, Alarm on  OT Assessment Results: Decreased ADL status, Decreased upper extremity range of motion, Decreased upper extremity strength, Decreased safe judgment during ADL, Decreased endurance, Decreased sensation, Decreased functional mobility, Decreased IADLs  Prognosis: Good  Strengths: Ability to acquire knowledge, Housing layout, Support and attitude of living partners  Plan:  Treatment Interventions: ADL retraining, Functional transfer training, UE strengthening/ROM, Endurance training, Patient/family training, Equipment evaluation/education, Compensatory technique education, Continued evaluation  OT Frequency: 5 times per week (x6/wk prn)  OT Discharge Recommendations:  (mod I with intermittent assist for hip brace/iadl's)  Equipment Recommended upon Discharge: Wheeled walker, Bedside commode (hip kit)  Treatment Interventions: ADL retraining, Functional transfer training, UE strengthening/ROM, Endurance training, Patient/family training, Equipment evaluation/education, Compensatory technique education, Continued evaluation    Subjective   Previous Visit Info:  OT Last Visit  OT Received On: 03/08/24  General:  General  Reason for Referral: OT eval and treat s/p left hip dislocation and closed reduction  Past Medical History Relevant to Rehab: Pt adm 3/6/24 after leaning forward to grab something from floor and felt a \"pop\" in left hip.  Pt had left THR 10 years ago.  Xray revealed left hip dislocation.  Underwent closed reduction in OR 3/6/24. (PMHx:  left/right THR  (~10 yrs ago) with reported dislocation ~1 yr after lt thr, lt tkr, A-fib/flutter, DM, OA, HTN, neuropathy, asthma, lumbago, ELLIE)  Family/Caregiver Present: No  Patient Position " Received: Up in chair, Alarm on  Precautions:  LE Weight Bearing Status: Weight Bearing as Tolerated  Medical Precautions: Fall precautions  Post-Surgical Precautions: Left hip precautions  Braces Applied: Left hip abductor brace at all times  Vital Signs:     Pain:  Pain Assessment  Pain Assessment: 0-10  Pain Score: 6  Pain Type:  (left hip, offered cold pack, declined)  Pain Location: Hip  Pain Orientation: Left  Pain Interventions: Medication (See MAR), Emotional support    Objective    Cognition:  Cognition  Overall Cognitive Status: Within Functional Limits  Orientation Level: Oriented X4    Activities of Daily Living: LE Dressing  LE Dressing Adaptive Equipment: Reacher, Sock aide, Dressing stick  Sock Level of Assistance:  (sba and min cues for techs)  LE Dressing Where Assessed: Wheelchair  LE Dressing Comments: needed intermittent cues for not over reaching for ae    EDUCATION:  Education  Individual(s) Educated: Patient  Education Provided:  (on walker safety techs, thr precautions with 100% on quizzes. on hip kit, pt currently has sock aid, long shoe horn and reacher.)  Plan of Care Discussed and Agreed Upon: yes  Patient Response to Education: Patient/Caregiver Verbalized Understanding of Information  Education Comment: however, needs intermittent cues for application    Goals:  Encounter Problems       Encounter Problems (Active)       Bathing       LTG - Patient will utilize adaptive techniques to bathe body sba (Progressing)       Start:  03/08/24    Expected End:  03/13/24               Dressing Upper Extremities       LTG - Patient will complete upper body dressing mod I (Progressing)       Start:  03/08/24    Expected End:  03/13/24               Dressings Lower Extremities       LTG - Patient will dress lower body/hip brace min a (Progressing)       Start:  03/08/24    Expected End:  03/13/24               Functional Mobility       Completes functional mobility via wwalker mod I (Progressing)        Start:  03/08/24    Expected End:  03/13/24               Grooming       LTG - Patient will complete daily grooming tasks indep (Progressing)       Start:  03/08/24    Expected End:  03/13/24               Instrumental Activities of Daily Living       LTG - Patient will complete simple kitchen access/meal preparation activities supervision via melissa (Progressing)       Start:  03/08/24    Expected End:  03/13/24               OT Transfers       LTG - Patient will transfer to commode mod I (Progressing)       Start:  03/08/24    Expected End:  03/13/24            LTG - Patient will transfer to walk in shower/dme sba (Progressing)       Start:  03/08/24    Expected End:  03/13/24                            Satisfactory

## 2025-08-06 DIAGNOSIS — M25.561 CHRONIC PAIN OF RIGHT KNEE: ICD-10-CM

## 2025-08-06 DIAGNOSIS — G89.29 CHRONIC PAIN OF RIGHT KNEE: ICD-10-CM

## 2025-08-12 ENCOUNTER — HOSPITAL ENCOUNTER (OUTPATIENT)
Dept: RADIOLOGY | Facility: CLINIC | Age: 75
Discharge: HOME | End: 2025-08-12
Payer: MEDICARE

## 2025-08-12 ENCOUNTER — OFFICE VISIT (OUTPATIENT)
Dept: ORTHOPEDIC SURGERY | Facility: CLINIC | Age: 75
End: 2025-08-12
Payer: MEDICARE

## 2025-08-12 VITALS — WEIGHT: 262 LBS | HEIGHT: 66 IN | BODY MASS INDEX: 42.11 KG/M2

## 2025-08-12 DIAGNOSIS — G89.29 CHRONIC PAIN OF RIGHT KNEE: ICD-10-CM

## 2025-08-12 DIAGNOSIS — M25.561 CHRONIC PAIN OF RIGHT KNEE: ICD-10-CM

## 2025-08-12 DIAGNOSIS — M17.11 ARTHRITIS OF RIGHT KNEE: Primary | ICD-10-CM

## 2025-08-12 PROCEDURE — 2500000004 HC RX 250 GENERAL PHARMACY W/ HCPCS (ALT 636 FOR OP/ED): Performed by: ORTHOPAEDIC SURGERY

## 2025-08-12 PROCEDURE — 73560 X-RAY EXAM OF KNEE 1 OR 2: CPT | Mod: 50

## 2025-08-12 PROCEDURE — 73560 X-RAY EXAM OF KNEE 1 OR 2: CPT | Mod: RT

## 2025-08-12 PROCEDURE — 20610 DRAIN/INJ JOINT/BURSA W/O US: CPT | Mod: RT | Performed by: ORTHOPAEDIC SURGERY

## 2025-08-12 PROCEDURE — 1036F TOBACCO NON-USER: CPT | Performed by: ORTHOPAEDIC SURGERY

## 2025-08-12 PROCEDURE — 1159F MED LIST DOCD IN RCRD: CPT | Performed by: ORTHOPAEDIC SURGERY

## 2025-08-12 PROCEDURE — 1160F RVW MEDS BY RX/DR IN RCRD: CPT | Performed by: ORTHOPAEDIC SURGERY

## 2025-08-12 PROCEDURE — 73560 X-RAY EXAM OF KNEE 1 OR 2: CPT | Mod: BILATERAL PROCEDURE | Performed by: RADIOLOGY

## 2025-08-12 PROCEDURE — 99213 OFFICE O/P EST LOW 20 MIN: CPT | Performed by: ORTHOPAEDIC SURGERY

## 2025-08-12 PROCEDURE — 99212 OFFICE O/P EST SF 10 MIN: CPT | Mod: 25 | Performed by: ORTHOPAEDIC SURGERY

## 2025-08-12 RX ADMIN — TRIAMCINOLONE ACETONIDE 40 MG: 400 INJECTION, SUSPENSION INTRA-ARTICULAR; INTRAMUSCULAR at 14:55

## 2025-08-12 RX ADMIN — LIDOCAINE HYDROCHLORIDE 2 ML: 10 INJECTION, SOLUTION INFILTRATION; PERINEURAL at 14:55

## 2025-08-12 ASSESSMENT — ENCOUNTER SYMPTOMS
LOSS OF SENSATION IN FEET: 0
OCCASIONAL FEELINGS OF UNSTEADINESS: 1
DEPRESSION: 0

## 2025-08-13 RX ORDER — TRIAMCINOLONE ACETONIDE 40 MG/ML
40 INJECTION, SUSPENSION INTRA-ARTICULAR; INTRAMUSCULAR
Status: COMPLETED | OUTPATIENT
Start: 2025-08-12 | End: 2025-08-12

## 2025-08-13 RX ORDER — LIDOCAINE HYDROCHLORIDE 10 MG/ML
2 INJECTION, SOLUTION INFILTRATION; PERINEURAL
Status: COMPLETED | OUTPATIENT
Start: 2025-08-12 | End: 2025-08-12

## 2025-08-26 ENCOUNTER — OFFICE VISIT (OUTPATIENT)
Dept: ORTHOPEDIC SURGERY | Facility: CLINIC | Age: 75
End: 2025-08-26
Payer: MEDICARE

## 2025-08-26 DIAGNOSIS — M17.11 ARTHRITIS OF RIGHT KNEE: Primary | ICD-10-CM

## 2025-08-26 PROCEDURE — 20610 DRAIN/INJ JOINT/BURSA W/O US: CPT | Mod: RT

## 2025-08-26 PROCEDURE — 2500000004 HC RX 250 GENERAL PHARMACY W/ HCPCS (ALT 636 FOR OP/ED)

## 2025-08-26 PROCEDURE — 99212 OFFICE O/P EST SF 10 MIN: CPT

## 2025-08-26 PROCEDURE — 1036F TOBACCO NON-USER: CPT

## 2025-08-26 PROCEDURE — 1159F MED LIST DOCD IN RCRD: CPT

## 2025-08-26 PROCEDURE — 1160F RVW MEDS BY RX/DR IN RCRD: CPT

## 2025-08-26 RX ADMIN — Medication 16.8 MG: at 09:30

## 2025-09-02 ENCOUNTER — OFFICE VISIT (OUTPATIENT)
Dept: ORTHOPEDIC SURGERY | Facility: CLINIC | Age: 75
End: 2025-09-02
Payer: MEDICARE

## 2025-09-02 VITALS — HEIGHT: 66 IN | BODY MASS INDEX: 42.11 KG/M2 | WEIGHT: 262 LBS

## 2025-09-02 DIAGNOSIS — M17.11 ARTHRITIS OF RIGHT KNEE: Primary | ICD-10-CM

## 2025-09-02 PROCEDURE — 20610 DRAIN/INJ JOINT/BURSA W/O US: CPT | Mod: RT

## 2025-09-02 PROCEDURE — 1159F MED LIST DOCD IN RCRD: CPT

## 2025-09-02 PROCEDURE — 2500000004 HC RX 250 GENERAL PHARMACY W/ HCPCS (ALT 636 FOR OP/ED)

## 2025-09-02 PROCEDURE — 99024 POSTOP FOLLOW-UP VISIT: CPT

## 2025-09-02 PROCEDURE — 1160F RVW MEDS BY RX/DR IN RCRD: CPT

## 2025-09-02 PROCEDURE — 1036F TOBACCO NON-USER: CPT

## 2025-09-02 PROCEDURE — 99212 OFFICE O/P EST SF 10 MIN: CPT | Mod: 25

## 2025-09-02 RX ADMIN — Medication 16.8 MG: at 15:09

## (undated) DEVICE — PADDING, UNDERCAST, WEBRIL, 6 IN X 4 YD, REG, NS

## (undated) DEVICE — PADDING, UNDERCAST, WEBRIL II, 4 IN X 4 YD, CRIMP, NS

## (undated) DEVICE — DRAPE, SHEET, U, W/ADHESIVE STRIP, IMPERVIOUS, 60 X 70 IN, DISPOSABLE, LF, STERILE

## (undated) DEVICE — BASIN KIT, SINGLE

## (undated) DEVICE — DRAPE, U-DRAPE, NON STERILE

## (undated) DEVICE — Device

## (undated) DEVICE — PADDING, UNDERCAST, WYTEX, 3 IN X 4 YD, STERILE

## (undated) DEVICE — GOWN, SURGICAL, IMPLT, BACK, XLARGE, XLONG, STERILE

## (undated) DEVICE — BANDAGE, ELASTIC, PREMIUM, SELF-CLOSE, 4 IN X 5.5 YD, STERILE

## (undated) DEVICE — SPONGE, LAP, XRAY DECT, 4IN X 18IN, W/MASTER DMT, STERILE

## (undated) DEVICE — BANDAGE, ELASTIC, PREMIUM, SELF-CLOSE, 6 IN X 5.5 YD, STERILE

## (undated) DEVICE — BANDAGE, ELASTIC, PREMIUM, SELF-CLOSE, 3 IN X 5 YD, STERILE

## (undated) DEVICE — COVER, MAYO STAND, W/PAD, 23 IN, DISPOSABLE, PLASTIC, LF, STERILE

## (undated) DEVICE — MARKER, SKIN, REGULAR TIP, W/FLEXI-RULER

## (undated) DEVICE — APPLICATOR, CHLORAPREP, W/ORANGE TINT, 26ML

## (undated) DEVICE — BANDAGE, ELASTIC, PREMIUM, SELF-CLOSE, 2 IN X 5 YD, STERILE

## (undated) DEVICE — SLEEVE, VASO PRESS, CALF GARMENT, MEDIUM, GREEN

## (undated) DEVICE — DRAPE, SHEET, THREE QUARTER, FAN FOLD, 57 X 77 IN

## (undated) DEVICE — HANDLE COVER, LIGHT, RIGID, DISP, LF